# Patient Record
Sex: MALE | Race: WHITE | Employment: OTHER | URBAN - METROPOLITAN AREA
[De-identification: names, ages, dates, MRNs, and addresses within clinical notes are randomized per-mention and may not be internally consistent; named-entity substitution may affect disease eponyms.]

---

## 2017-01-04 ENCOUNTER — OFFICE VISIT (OUTPATIENT)
Dept: HEMATOLOGY | Age: 57
End: 2017-01-04

## 2017-01-04 VITALS
HEIGHT: 70 IN | BODY MASS INDEX: 28.49 KG/M2 | HEART RATE: 77 BPM | RESPIRATION RATE: 16 BRPM | TEMPERATURE: 99.1 F | DIASTOLIC BLOOD PRESSURE: 71 MMHG | SYSTOLIC BLOOD PRESSURE: 127 MMHG | OXYGEN SATURATION: 99 % | WEIGHT: 199 LBS

## 2017-01-04 DIAGNOSIS — K70.30 ALCOHOLIC CIRRHOSIS OF LIVER WITHOUT ASCITES (HCC): ICD-10-CM

## 2017-01-04 DIAGNOSIS — K70.30 ALCOHOLIC CIRRHOSIS OF LIVER WITHOUT ASCITES (HCC): Primary | ICD-10-CM

## 2017-01-04 PROBLEM — Z98.61 H/O CORONARY ANGIOPLASTY: Status: ACTIVE | Noted: 2017-01-04

## 2017-01-04 PROBLEM — M54.40 CHRONIC LOW BACK PAIN WITH SCIATICA: Status: ACTIVE | Noted: 2017-01-04

## 2017-01-04 PROBLEM — F10.11 ALCOHOL ABUSE, IN REMISSION: Status: ACTIVE | Noted: 2017-01-04

## 2017-01-04 PROBLEM — G89.29 CHRONIC LOW BACK PAIN WITH SCIATICA: Status: ACTIVE | Noted: 2017-01-04

## 2017-01-04 PROBLEM — Z98.890 H/O LUMBOSACRAL SPINE SURGERY: Status: ACTIVE | Noted: 2017-01-04

## 2017-01-04 PROBLEM — Z98.890 S/P CORONARY ANGIOGRAM: Status: ACTIVE | Noted: 2017-01-04

## 2017-01-04 RX ORDER — TRAMADOL HYDROCHLORIDE 50 MG/1
100 TABLET ORAL
Qty: 60 TAB | Refills: 3 | Status: SHIPPED | OUTPATIENT
Start: 2017-01-04 | End: 2017-01-11 | Stop reason: SDUPTHER

## 2017-01-04 RX ORDER — OMEPRAZOLE 40 MG/1
40 CAPSULE, DELAYED RELEASE ORAL DAILY
Qty: 30 CAP | Refills: 3 | Status: SHIPPED | OUTPATIENT
Start: 2017-01-04 | End: 2017-01-11 | Stop reason: SDUPTHER

## 2017-01-04 NOTE — PROGRESS NOTES
[address], MD, FACP, [facility]     April Jose Members, NP     Severo Callas, PA-C Mort Abrahams, MD, MD Smita Pearson, MAURIZIO Mosher NP        [address]     [address] [place] [address], [address] Devang Jara  22.     [phone]     FAX: 411 [address], [address] [facility], [address]     [phone]     FAX: [phone]         Patient Care Team:  Serjio Ku MD as PCP - General (Family Practice)  Karen Dean NP. [facility]      Problem List  Date Reviewed: 1/4/2017          Codes Class Noted    Alcohol abuse, in remission ICD-10-CM: F10.10  ICD-9-CM: 305.03  1/4/2017        H/O lumbosacral spine surgery ICD-10-CM: Z98.890  ICD-9-CM: V15.29  1/4/2017        Chronic low back pain with sciatica ICD-10-CM: M54.40, G89.29  ICD-9-CM: 724.2, 724.3, 338.29  1/4/2017        H/O coronary angioplasty ICD-10-CM: Z98.61  ICD-9-CM: V45.82  1/4/2017        Anxiety and depression ICD-10-CM: F41.9, F32.9  ICD-9-CM: 300.00, 311  8/22/2013                The physicians listed above have asked me to see Deja Alfaro in consultation regarding management of cirrhosis secondary to alcohol. All medical records sent by the referring physicians were reviewed including imaging studies     The patient is a 55 y.o.  male who was first found to have chronic liver disease and cirrhosis in 12/2016 when he was hospitalized at [facility] because of agitation, tremor and weakness. An assessment of liver fibrosis with biopsy or elastography has not been performed. An ultrasound of the liver suggested cirrhosis with some ascites. The patient has not developed symptomatic or obvious ascites. The patient has not developed edema. The patient has not developed hepatic encephalopathy.     The patient has not had been evaluated for varices. The most recent laboratory studies indicate that the liver transaminases are elevated, ALP is normal, total bilirubin is normal, albumin is depressed, and the platelet count is depressed. There are multiple ER visits at [facility] with a positive blood ETOH between 2013-12/2016. The patient notes fatigue, fevers, diffuse abdominal pain, nausea,     The patient has limitations in functional activities secondary to these symptoms and limitations in functional activities secondary to other medical problems that are not related to the liver disease. The patient has not experienced problems concentrating, swelling of the abdomen, swelling of the lower extremities, hematemesis, hematochezia. ALLERGIES  Allergies   Allergen Reactions    Aleve [Naproxen Sodium] Itching       MEDICATIONS  Current Outpatient Prescriptions   Medication Sig    insulin glargine (LANTUS) 100 unit/mL injection 20 Units by SubCUTAneous route nightly.  lamoTRIgine (LAMICTAL) 200 mg tablet Take 200 mg by mouth nightly.  quinapril (ACCUPRIL) 10 mg tablet Take 40 mg by mouth daily.  aspirin 81 mg tablet Take 81 mg by mouth daily.  amLODIPine (NORVASC) 10 mg tablet Take 10 mg by mouth daily.  metoprolol-XL (TOPROL XL) 200 mg XL tablet Take 200 mg by mouth daily.  glimepiride (AMARYL) 4 mg tablet Take 4 mg by mouth two (2) times a day. No current facility-administered medications for this visit. SYSTEM REVIEW NOT RELATED TO LIVER DISEASE OR REVIEWED ABOVE:  Constitution systems: Negative for fever, chills, weight gain, weight loss. Eyes: Negative for visual changes. ENT: Negative for sore throat, painful swallowing. Respiratory: Negative for cough, hemoptysis, SOB. Cardiology: Negative for chest pain, palpitations. GI:  Negative for constipation or diarrhea. : Negative for urinary frequency, dysuria, hematuria, nocturia. Skin: Negative for rash.   Hematology: Negative for easy bruising, blood clots. Musculo-skelatal: Chronic back pain. Neurologic: Negative for headaches, dizziness, vertigo, memory problems not related to HE. Psychology: Depression that is not medicated. FAMILY HISTORY:  The father  of heart disease. The mother  of heart disease. There is no family history of liver disease. SOCIAL HISTORY:  The patient is . The patient has 2 children,   The patient has never used tobacco products. The patient has previously consumed alcohol in excess. The patient has been abstinent from alcohol since 3/2016. He has starting drinking again because of abdominal pain in 2016. The patient used to work in business management. The patient has not worked since . The patient is currently receiving disability. PHYSICAL EXAMINATION:  Visit Vitals    /71 (BP 1 Location: Right arm, BP Patient Position: Sitting)    Pulse 77    Temp 99.1 °F (37.3 °C) (Tympanic)    Resp 16    Ht 5' 10\" (1.778 m)    Wt 199 lb (90.3 kg)    SpO2 99%    BMI 28.55 kg/m2     General: No acute distress. Eyes: Sclera anicteric. ENT: No oral lesions. Thyroid normal.  Nodes: No adenopathy. Skin: No spider angiomata. No jaundice. No palmar erythema. Respiratory: Lungs clear to auscultation. Cardiovascular: Regular heart rate. No murmurs. No JVD. Abdomen: Soft non-tender. Liver size normal to percussion/palpation. Spleen not palpable. No obvious ascites. Extremities: No edema. No muscle wasting. No gross arthritic changes. Neurologic: Alert and oriented. Cranial nerves grossly intact. No asterixis. LABORATORY STUDIES:  From 2016  AST/ALT/ALP/T Bili/ALB:  58/48/145/0.9/2.7  WBC/HB/PLT/INR:  Xx/11.9/66  BUN/CREAT:  10/0.7    SEROLOGIES:  Not available or performed. Testing will be performed as indicated. LIVER HISTOLOGY:  Not available or performed    ENDOSCOPIC PROCEDURES:  2014. Colonoscopy by Dr David Jj. Inflammatory polyp. RADIOLOGY:  2016. CT scan abdomen with IV contrast.  Changes consistent with cirrhosis. No liver mass lesions. No dilated bile ducts. No ascites. OTHER TESTIN2016. Drug toxicology screen negative  2016. Blood ETOH positive    ASSESSMENT AND PLAN:  Cirrhosis which has been attributed to alcohol     Will perform laboratory testing to monitor liver function and degree of liver injury. This included BMP, hepatic panel, CBC with platelet count, INR. Liver transaminases are elevated. Alkaline phosphatase is normal.  Liver function is normal.  Total bilirubin is normal.  Serum albumin is depressed. The platelet count is depressed. Serologic testing to exclude a co-existent cause of chronic liver disease were ordered. The patient has never developed any complications of cirrhosis to date. The CTP is 6. Child class A. The MELD score is 11. Esophageal varices have not been previously assessed for with upper endoscopy. Will schedule for EGD to assess for varices and need for banding. Hepatic encephalopathy has not developed to date. There is no need for treatment with lactulose and/or Xifaxan at this time. No need to restrict dietary protein at this time. The patient was directed to continue all current medications at the current dosages. There are no contraindications for the patient to take any medications that are necessary for treatment of other medical issues. The patient was advised to be abstinent of all alcohol. Thrombocytopenia secondary to cirrhosis. There is no evidence of overt bleeding. There is no indication for platelet transfusions or pharmacologic treatment to increase the platelet count. Anemia from chronic GI blood loss from portal hypertension and iron deficiency or bone marrow suppression from alcohol. Osteoporosis is more common in patients with cirrhosis.   Bone density to assess for osteoporosis has not been performed. The need for vaccination against viral hepatitis A and B will be assessed with serologic and instituted as appropriate. [facility] [address] screening has recently been performed and does not suggest [address] The next liver imaging study will be performed in 6/2017. All of the above issues were discussed with the patient. All questions were answered. The patient expressed a clear understanding of the above. [address] [place] [address] in 4 weeks to review results of blood work and develop a management plan.     Leida Jay MD  [facility] [place] [facility] [address] [place] [address], [address] [place] [address], [address] [place] [address] Street [address]  [phone]

## 2017-01-04 NOTE — MR AVS SNAPSHOT
Visit Information Date & Time Provider Department Dept. Phone Encounter #  
 1/4/2017  9:30 AM Vivek Devlin MD [facility] [employer] [id number] Follow-up Instructions Return in about 4 weeks (around 2/1/2017) for MLS. Your Appointments 1/24/2017  9:30 AM  
PROCEDURE with Sylvain Pacheco MD  
[facility] ([address]) Appt Note: EGD/Cirrhosis [address] [place] [address]  
[phone]  
  
   
 [address] [address] [place] [address]  
  
    
 2/8/2017  9:15 AM  
Follow Up with Vivek Devlin MD  
[facility] [place] [facility] ([address]) Appt Note: Alcoholic Cirrhosis [address] [place]  
  
   
 [address] [facility] [address] [place] [address] Upcoming Health Maintenance Date Due Hepatitis C Screening 1960 Pneumococcal 19-64 Medium Risk (1 of 1 - PPSV23) 6/6/1980 DTaP/Tdap/Td series (1 - Tdap) 6/6/1982 FOBT Q 1 YEAR AGE 50-75 6/6/2011 INFLUENZA AGE 9 TO ADULT 8/1/2016 Allergies as of 1/4/2017  Review Complete On: 1/4/2017 By: Marylee Bogus Severity Noted Reaction Type Reactions Aleve [Naproxen Sodium]  08/21/2013    Itching Current Immunizations  Never Reviewed No immunizations on file. Not reviewed this visit You Were Diagnosed With   
  
 Codes Comments Alcoholic cirrhosis [facility] liver without asc[facility]    -  Primary ICD-10-CM: K70.30 ICD-9-CM: 571.2 Vitals BP Pulse Temp Resp Height(growth percentile) 127/71 (BP 1 Location: Right arm, BP Patient Position: Sitting) 77 99.1 °F (37.3 °C) (Tympanic) 16 5' 10\" (1.778 m) Weight(growth percentile) SpO2 BMI Smoking Status 199 lb (90.3 kg) 99% 28.55 kg/m2 Never Smoker Vitals History BMI and BSA Data Body Mass Index Body Surface Area 28.55 kg/m 2 2.11 m 2 Preferred Pharmacy Pharmacy Name Phone [address] [place] [address] [phone] Your Updated Medication List  
  
   
This list is accurate as of: 1/4/17 10:39 AM.  Always use your most recent med list.  
  
  
  
  
 omeprazole 40 mg capsule Commonly known as:  PRILOSEC Take 1 Cap by mouth daily. traMADol 50 mg tablet Commonly known as:  ULTRAM  
Take 2 Tabs by mouth every eight (8) hours as needed for Pain. Max Daily Amount: 300 mg. Prescriptions Printed Refills  
 traMADol (ULTRAM) 50 mg tablet 3 Sig: Take 2 Tabs by mouth every eight (8) hours as needed for Pain. Max Daily Amount: 300 mg. Class: Print Route: Oral  
  
Prescriptions Sent to Pharmacy Refills  
 omeprazole (PRILOSEC) 40 mg capsule 3 Sig: Take 1 Cap by mouth daily. Class: Normal  
 Pharmacy: [address] [place] [address] #: [phone] Route: Oral  
  
Follow-up Instructions Return in about 4 weeks (around 2/1/2017) for MLS. To-Do List   
 01/04/2017 Lab:  ACTIN (SMOOTH MUSCLE) ANTIBODY   
  
 01/04/2017 Lab:  AFP WITH AFP-L3% 01/04/2017 Lab:  ALPHA-1-ANTITRYPSIN, TOTAL   
  
 01/04/2017 Lab:  ANTINUCLEAR ANTIBODIES, IFA   
  
 01/04/2017 Lab:  CBC WITH AUTOMATED DIFF   
  
 01/04/2017 Lab:  CERULOPLASMIN   
  
 01/04/2017 Lab:  ETHYL ALCOHOL   
  
 01/04/2017 Lab:  FERRITIN   
  
 01/04/2017 Lab:  HCV AB W/REFLEX VERIFICATION   
  
 01/04/2017 Lab:  HEMOGLOBIN A1C WITH EAG   
  
 01/04/2017 Lab:  HEP A AB, TOTAL   
  
 01/04/2017 Lab:  HEP B SURFACE AB   
  
 01/04/2017 Lab:  HEP B SURFACE AG   
  
 01/04/2017 Lab:  HEPATIC FUNCTION PANEL   
  
 01/04/2017 Lab:  HEPATITIS B CORE AB, TOTAL   
  
 01/04/2017 Lab:  IRON PROFILE   
  
 01/04/2017 Lab: METABOLIC PANEL, BASIC   
  
 01/04/2017 Lab:  MITOCHONDRIAL M2 AB   
  
 01/04/2017 Lab:  PROTHROMBIN TIME + INR   
  
 01/04/2017 Imaging:  US ABD COMP W ELASTOGRAPHY   
  
 02/03/2017 GI:  EGD Introducing [facility]! Evens Castellano introduces [employer] patient portal. Now you can access parts of your medical record, email your doctor's office, and request medication refills online. 1. In your internet browser, go to [email or web address] 2. Click on the First Time User? Click Here link in the Sign In box. You will see the New Member Sign Up page. 3. Enter your [employer] Access Code exactly as it appears below. You will not need to use this code after youve completed the sign-up process. If you do not sign up before the expiration date, you must request a new code. · [employer] Access Code: [email or web address] Expires: 4/4/2017  9:21 AM 
 
4. Enter the last four digits of your Social Security Number (xxxx) and Date of Birth (mm/dd/yyyy) as indicated and click Submit. You will be taken to the next sign-up page. 5. Create a [employer] ID. This will be your [employer] login ID and cannot be changed, so think of one that is secure and easy to remember. 6. Create a [employer] password. You can change your password at any time. 7. Enter your Password Reset Question and Answer. This can be used at a later time if you forget your password. 8. Enter your e-mail address. You will receive e-mail notification when new information is available in [address] 9. Click Sign Up. You can now view and download portions of your medical record. 10. Click the Download Summary menu link to download a portable copy of your medical information. If you have questions, please visit the Frequently Asked Questions section of the [employer] website. Remember, [employer] is NOT to be used for urgent needs. For medical emergencies, dial 911. Now available from your iPhone and Android! Please provide this summary of care documentation to your next provider. Your primary care clinician is listed as  [facility] Alex. If you have any questions after today's visit, please call [phone].

## 2017-01-11 ENCOUNTER — TELEPHONE (OUTPATIENT)
Dept: HEMATOLOGY | Age: 57
End: 2017-01-11

## 2017-01-11 NOTE — TELEPHONE ENCOUNTER
Patient would like for you to send his prescriptions to [facility] in [place]. Omeprazole and tramadol.

## 2017-01-11 NOTE — TELEPHONE ENCOUNTER
Received call from patient. Verified name and date of birth. Patient informed me that Rx was not sent into his [place]. After reviewing chart, advised patient that it was sent into [facility], advised patient that I would make the update and resend to correct pharmacy.

## 2017-01-12 RX ORDER — TRAMADOL HYDROCHLORIDE 50 MG/1
100 TABLET ORAL
Qty: 60 TAB | Refills: 3 | Status: SHIPPED | OUTPATIENT
Start: 2017-01-12 | End: 2018-02-14

## 2017-01-12 RX ORDER — OMEPRAZOLE 40 MG/1
40 CAPSULE, DELAYED RELEASE ORAL DAILY
Qty: 30 CAP | Refills: 3 | Status: SHIPPED | OUTPATIENT
Start: 2017-01-12 | End: 2017-01-16 | Stop reason: SDUPTHER

## 2017-01-16 DIAGNOSIS — K21.00 GASTROESOPHAGEAL REFLUX DISEASE WITH ESOPHAGITIS: Primary | ICD-10-CM

## 2017-01-16 RX ORDER — OMEPRAZOLE 40 MG/1
40 CAPSULE, DELAYED RELEASE ORAL DAILY
Qty: 30 CAP | Refills: 3 | Status: SHIPPED | OUTPATIENT
Start: 2017-01-16 | End: 2018-02-14

## 2017-01-17 ENCOUNTER — TELEPHONE (OUTPATIENT)
Dept: HEMATOLOGY | Age: 57
End: 2017-01-17

## 2017-01-17 NOTE — TELEPHONE ENCOUNTER
Kimberly Ramirez states that [facility]'t [facility] tramadol prescription can you please resend prescription?

## 2017-01-23 RX ORDER — TRAMADOL HYDROCHLORIDE 50 MG/1
100 TABLET ORAL
Qty: 60 TAB | Refills: 3 | Status: CANCELLED | OUTPATIENT
Start: 2017-01-23

## 2017-01-25 ENCOUNTER — TELEPHONE (OUTPATIENT)
Dept: HEMATOLOGY | Age: 57
End: 2017-01-25

## 2018-01-12 ENCOUNTER — HOSPITAL ENCOUNTER (INPATIENT)
Age: 58
LOS: 6 days | Discharge: HOME | DRG: 753 | End: 2018-01-18
Attending: STUDENT IN AN ORGANIZED HEALTH CARE EDUCATION/TRAINING PROGRAM | Admitting: STUDENT IN AN ORGANIZED HEALTH CARE EDUCATION/TRAINING PROGRAM
Payer: MEDICAID

## 2018-01-12 PROBLEM — R45.851 SUICIDAL THOUGHTS: Status: ACTIVE | Noted: 2018-01-12

## 2018-01-12 PROBLEM — F32.A DEPRESSION: Status: ACTIVE | Noted: 2018-01-12

## 2018-01-12 LAB — GLUCOSE BLD STRIP.AUTO-MCNC: 175 MG/DL (ref 70–110)

## 2018-01-12 PROCEDURE — 74011250637 HC RX REV CODE- 250/637: Performed by: STUDENT IN AN ORGANIZED HEALTH CARE EDUCATION/TRAINING PROGRAM

## 2018-01-12 PROCEDURE — 82962 GLUCOSE BLOOD TEST: CPT

## 2018-01-12 PROCEDURE — 65220000003 HC RM SEMIPRIVATE PSYCH

## 2018-01-12 RX ORDER — HALOPERIDOL 5 MG/ML
5 INJECTION INTRAMUSCULAR
Status: DISCONTINUED | OUTPATIENT
Start: 2018-01-12 | End: 2018-01-18 | Stop reason: HOSPADM

## 2018-01-12 RX ORDER — GABAPENTIN 300 MG/1
300 CAPSULE ORAL 2 TIMES DAILY
Status: DISCONTINUED | OUTPATIENT
Start: 2018-01-12 | End: 2018-01-18 | Stop reason: HOSPADM

## 2018-01-12 RX ORDER — QUETIAPINE FUMARATE 400 MG/1
400 TABLET, FILM COATED ORAL
COMMUNITY
End: 2018-02-14

## 2018-01-12 RX ORDER — HALOPERIDOL 5 MG/1
5 TABLET ORAL
Status: DISCONTINUED | OUTPATIENT
Start: 2018-01-12 | End: 2018-01-18 | Stop reason: HOSPADM

## 2018-01-12 RX ORDER — BUSPIRONE HYDROCHLORIDE 15 MG/1
15 TABLET ORAL 2 TIMES DAILY
COMMUNITY
End: 2018-02-14

## 2018-01-12 RX ORDER — METFORMIN HYDROCHLORIDE 500 MG/1
1000 TABLET, EXTENDED RELEASE ORAL 2 TIMES DAILY WITH MEALS
Status: DISCONTINUED | OUTPATIENT
Start: 2018-01-13 | End: 2018-01-18 | Stop reason: HOSPADM

## 2018-01-12 RX ORDER — TRAZODONE HYDROCHLORIDE 50 MG/1
50 TABLET ORAL
Status: DISCONTINUED | OUTPATIENT
Start: 2018-01-12 | End: 2018-01-18 | Stop reason: HOSPADM

## 2018-01-12 RX ORDER — GABAPENTIN 300 MG/1
300 CAPSULE ORAL 2 TIMES DAILY
COMMUNITY
End: 2018-02-14

## 2018-01-12 RX ORDER — MAGNESIUM SULFATE 100 %
16 CRYSTALS MISCELLANEOUS AS NEEDED
Status: DISCONTINUED | OUTPATIENT
Start: 2018-01-12 | End: 2018-01-18 | Stop reason: HOSPADM

## 2018-01-12 RX ORDER — ACETAMINOPHEN 500 MG
500 TABLET ORAL
Status: DISCONTINUED | OUTPATIENT
Start: 2018-01-12 | End: 2018-01-18 | Stop reason: HOSPADM

## 2018-01-12 RX ORDER — QUETIAPINE FUMARATE 200 MG/1
400 TABLET, FILM COATED ORAL 2 TIMES DAILY
Status: DISCONTINUED | OUTPATIENT
Start: 2018-01-12 | End: 2018-01-13

## 2018-01-12 RX ORDER — METFORMIN HYDROCHLORIDE 1000 MG/1
1000 TABLET ORAL 2 TIMES DAILY WITH MEALS
COMMUNITY
End: 2018-02-14

## 2018-01-12 RX ORDER — LORAZEPAM 2 MG/ML
1-2 INJECTION INTRAMUSCULAR
Status: DISCONTINUED | OUTPATIENT
Start: 2018-01-12 | End: 2018-01-18 | Stop reason: HOSPADM

## 2018-01-12 RX ORDER — LORAZEPAM 1 MG/1
1-2 TABLET ORAL
Status: DISCONTINUED | OUTPATIENT
Start: 2018-01-12 | End: 2018-01-18 | Stop reason: HOSPADM

## 2018-01-12 RX ORDER — INSULIN LISPRO 100 [IU]/ML
INJECTION, SOLUTION INTRAVENOUS; SUBCUTANEOUS
Status: DISCONTINUED | OUTPATIENT
Start: 2018-01-12 | End: 2018-01-18 | Stop reason: HOSPADM

## 2018-01-12 RX ORDER — BUSPIRONE HYDROCHLORIDE 5 MG/1
15 TABLET ORAL 2 TIMES DAILY
Status: DISCONTINUED | OUTPATIENT
Start: 2018-01-12 | End: 2018-01-18 | Stop reason: HOSPADM

## 2018-01-12 RX ADMIN — QUETIAPINE FUMARATE 400 MG: 200 TABLET ORAL at 20:46

## 2018-01-12 RX ADMIN — BUSPIRONE HYDROCHLORIDE 15 MG: 5 TABLET ORAL at 20:46

## 2018-01-12 RX ADMIN — GABAPENTIN 300 MG: 300 CAPSULE ORAL at 20:46

## 2018-01-12 NOTE — BSMART NOTE
COMPREHENSIVE ASSESSMENT FORM PART 1  SECTION I - DISPOSITION  Communicated with B[place] at [facility] ([phone])    The plan is to transfer the patient from the [facility] to [address] [place] [address] Road [id number] on the Adult/Chemical Dependency Unit. The unit phone is [phone]. The Psychiatrist consulted was Dr. Sarah Tam who is also the accepting Psychiatrist.    [facility] unit admitting diagnosis is Depression and Suicidal Ideations (with a plan to overdose)    Allergies:  Aleve - Naproxen    EKG completed:  Normal Sinus Rhythm at 72 bpm.  Normal EKG. Patient was released from detention, last night (1/11/2018) after a 7-month stay. Once released, he was transported by police to [facility] with a complaint of chest pain. He later identified SI with a plan to overdose. ADMISSION ORDERS:  Patient is diabetic. Patient has a history of underlying heart disease with two (2) previous MI's managed by angioplasty without stenting. * Routine PRN admission medication orders  * Routine lab work  * Vital signs, routine  * Safety precautions  * POC Glucose  * Sliding scale insulin  * Diabetic diet with snacks (AM, Lunch, PM)    * Continue previous medications:       -- buspirone HCl/Buspar, 15 mg by mouth two times a day       -- gabapentin/Neurontin, take 300 mg by mouth two times a day       -- metformin XR/Glucophage-XR, 500 mg 24-hour tablet, take                1,000 mg by mouth two times a day.        -- quetiapine/Seroquel, take 400 mg by mouth two times a day    Manuella Lesches, RN, BSN

## 2018-01-12 NOTE — IP AVS SNAPSHOT
[facility] 
 
 
 [facility] Patient: Carlos Enrique Olivas MRN: [id number] :1960 About your hospitalization You were admitted on:  2018 You last received care in the:  [facility] ADULT CHEM DEP You were discharged on:  2018 Why you were hospitalized Your primary diagnosis was:  Not on File Your diagnoses also included:  Depression, Suicidal Thoughts Follow-up Information Follow up With Details Comments Contact Info Nidia Prince MD   [address] [place] [address] 
[phone] [facility] Service Board On 2018 Patient is scheduled to meet with Kat Chance or Pablo Ruggiero with the @ [address] [place] [address] 
[phone] Discharge Orders None A check tanvi indicates which time of day the medication should be taken. My Medications CHANGE how you take these medications Instructions Each Dose to Equal  
 Morning Noon Evening Bedtime * busPIRone 15 mg tablet Commonly known as:  BUSPAR What changed:  Another medication with the same name was added. Make sure you understand how and when to take each. Your last dose was: Your next dose is: Take 15 mg by mouth two (2) times a day. 15 mg  
    
   
   
   
  
 * busPIRone 15 mg tablet Commonly known as:  BUSPAR What changed: You were already taking a medication with the same name, and this prescription was added. Make sure you understand how and when to take each. Your last dose was: Your next dose is: Take 1 Tab by mouth two (2) times a day. Indications: Generalized Anxiety Disorder 15 mg  
    
   
   
   
  
 * NEURONTIN 300 mg capsule Generic drug:  gabapentin What changed:  Another medication with the same name was added. Make sure you understand how and when to take each. Your last dose was: Your next dose is: Take 300 mg by mouth two (2) times a day. 300 mg  
    
   
   
   
  
 * gabapentin 300 mg capsule Commonly known as:  NEURONTIN What changed: You were already taking a medication with the same name, and this prescription was added. Make sure you understand how and when to take each. Your last dose was: Your next dose is: Take 1 Cap by mouth two (2) times a day. Indications: Anxiety 300 mg  
    
   
   
   
  
 * SEROquel 400 mg tablet Generic drug:  QUEtiapine What changed:  Another medication with the same name was added. Make sure you understand how and when to take each. Your last dose was: Your next dose is: Take 400 mg by mouth. 400 mg  
    
   
   
   
  
 * QUEtiapine 400 mg tablet Commonly known as:  SEROquel What changed: You were already taking a medication with the same name, and this prescription was added. Make sure you understand how and when to take each. Your last dose was: Your next dose is: Take 1 Tab by mouth nightly. Indications: Generalized Anxiety Disorder 400 mg  
    
   
   
   
  
 * QUEtiapine 50 mg tablet Commonly known as:  SEROquel What changed: You were already taking a medication with the same name, and this prescription was added. Make sure you understand how and when to take each. Your last dose was: Your next dose is: Take 1 Tab by mouth three (3) times daily as needed (anxiety). Indications: Generalized Anxiety Disorder 50 mg  
    
   
   
   
  
 * Notice: This list has 7 medication(s) that are the same as other medications prescribed for you. Read the directions carefully, and ask your doctor or other care provider to review them with you. CONTINUE taking these medications Instructions Each Dose to Equal  
 Morning Noon Evening Bedtime metFORMIN 1,000 mg tablet Commonly known as:  GLUCOPHAGE Your last dose was: Your next dose is: Take 1,000 mg by mouth two (2) times daily (with meals). 1000 mg  
    
   
   
   
  
 omeprazole 40 mg capsule Commonly known as:  PRILOSEC Your last dose was: Your next dose is: Take 1 Cap by mouth daily. 40 mg  
    
   
   
   
  
 traMADol 50 mg tablet Commonly known as:  ULTRAM  
   
Your last dose was: Your next dose is: Take 2 Tabs by mouth every eight (8) hours as needed for Pain. Max Daily Amount: 300 mg.  
 100 mg Where to Get Your Medications Information on where to get these meds will be given to you by the nurse or doctor. ! Ask your nurse or doctor about these medications  
  busPIRone 15 mg tablet  
 gabapentin 300 mg capsule QUEtiapine 400 mg tablet QUEtiapine 50 mg tablet Discharge Instructions BEHAVIORAL HEALTH NURSING DISCHARGE NOTE Emergency Numbers [facility] Desk: [phone] [facility]: [phone] Suicide Prevention Line: [phone] (TALK) The following personal items collected during your admission are returned to you:  
Dental Appliance: Dental Appliances: None Vision:   
Hearing Aid:   
Jewelry: Jewelry: None Clothing: Clothing: Footwear, [address], [address] Other Valuables: Other Valuables: Cell Phone (locker 126/1) Valuables sent to safe: Personal Items Sent to Safe: va ebt, bank of [place] card, [facility] visa The discharge information has been reviewed with the patient. The patient verbalized understanding. Impulcity Activation Thank you for requesting access to Impulcity. Please follow the instructions below to securely access and download your online medical record.  Impulcity allows you to send messages to your doctor, view your test results, renew your prescriptions, schedule appointments, and more. How Do I Sign Up? In your internet browser, go to [email or web address] Click on the First Time User? Click Here link in the Sign In box. You will be redirect to the New Member Sign Up page. Enter your TriPlay Access Code exactly as it appears below. You will not need to use this code after youve completed the sign-up process. If you do not sign up before the expiration date, you must request a new code. TriPlay Access Code: [email or web address] Expires: 4/15/2018 10:45 AM (This is the date your TriPlay access code will ) Enter the last four digits of your Social Security Number (xxxx) and Date of Birth (mm/dd/yyyy) as indicated and click Submit. You will be taken to the next sign-up page. Create a TriPlay ID. This will be your TriPlay login ID and cannot be changed, so think of one that is secure and easy to remember. Create a TriPlay password. You can change your password at any time. Enter your Password Reset Question and Answer. This can be used at a later time if you forget your password. Enter your e-mail address. You will receive e-mail notification when new information is available in [address] Click Sign Up. You can now view and download portions of your medical record. Click the BioConsortia link to download a portable copy of your medical information. Additional Information If you have questions, please visit the Frequently Asked Questions section of the TriPlay website at [email or web address] Remember, TriPlay is NOT to be used for urgent needs. For medical emergencies, dial 911. Patient armband removed and shredded Introducing [facility]! [facility] introduces TriPlay patient portal. Now you can access parts of your medical record, email your doctor's office, and request medication refills online.    
 
1. In your internet browser, go to [email or web address] 2. Click on the First Time User? Click Here link in the Sign In box. You will see the New Member Sign Up page. 3. Enter your [employer] Access Code exactly as it appears below. You will not need to use this code after youve completed the sign-up process. If you do not sign up before the expiration date, you must request a new code. · [employer] Access Code: [email or web address] Expires: 4/15/2018 10:45 AM 
 
4. Enter the last four digits of your Social Security Number (xxxx) and Date of Birth (mm/dd/yyyy) as indicated and click Submit. You will be taken to the next sign-up page. 5. Create a [employer] ID. This will be your [employer] login ID and cannot be changed, so think of one that is secure and easy to remember. 6. Create a [employer] password. You can change your password at any time. 7. Enter your Password Reset Question and Answer. This can be used at a later time if you forget your password. 8. Enter your e-mail address. You will receive e-mail notification when new information is available in [address] 9. Click Sign Up. You can now view and download portions of your medical record. 10. Click the Download Summary menu link to download a portable copy of your medical information. If you have questions, please visit the Frequently Asked Questions section of the [employer] website. Remember, [employer] is NOT to be used for urgent needs. For medical emergencies, dial 911. Now available from your iPhone and Android! Providers Seen During Your Hospitalization Provider Specialty Primary office phone Severo Loss, MD Psychiatry [phone] Immunizations Administered for This Admission Name Date Influenza Vaccine (Quad) PF 1/16/2018 Your Primary Care Physician (PCP) Primary Care Physician Office Phone Office Fax Balbina Bates [phone] You are allergic to the following Allergen Reactions Aleve (Naproxen Sodium) Itching Recent Documentation Height Weight BMI Smoking Status 1.803 m 86.2 kg 26.5 kg/m2 Never Smoker Emergency Contacts Name Discharge Info Relation Home Work Mobile Aixa Bridges N/A  AT THIS TIME [6] Friend [5]   [phone] Patient Belongings The following personal items are in your possession at time of discharge: 
  Dental Appliances: None         Home Medications: None   Jewelry: None  Clothing: Footwear, Shorts, Shirt    Other Valuables: Cell Phone (locker 126/1)  Personal Items Sent to Safe: vishal white, [employer], [employer] visa Please provide this summary of care documentation to your next provider. Signatures-by signing, you are acknowledging that this After Visit Summary has been reviewed with you and you have received a copy. Patient Signature:  ____________________________________________________________ Date:  ____________________________________________________________  
  
Shea Moritz Provider Signature:  ____________________________________________________________ Date:  ____________________________________________________________

## 2018-01-13 LAB
ALBUMIN SERPL-MCNC: 3.3 G/DL (ref 3.4–5)
ALBUMIN/GLOB SERPL: 0.9 {RATIO} (ref 0.8–1.7)
ALP SERPL-CCNC: 107 U/L (ref 45–117)
ALT SERPL-CCNC: 29 U/L (ref 16–61)
ANION GAP SERPL CALC-SCNC: 6 MMOL/L (ref 3–18)
AST SERPL-CCNC: 32 U/L (ref 15–37)
BASOPHILS # BLD: 0 K/UL (ref 0–0.06)
BASOPHILS NFR BLD: 0 % (ref 0–2)
BILIRUB SERPL-MCNC: 0.7 MG/DL (ref 0.2–1)
BUN SERPL-MCNC: 8 MG/DL (ref 7–18)
BUN/CREAT SERPL: 11 (ref 12–20)
CALCIUM SERPL-MCNC: 8.2 MG/DL (ref 8.5–10.1)
CHLORIDE SERPL-SCNC: 112 MMOL/L (ref 100–108)
CO2 SERPL-SCNC: 27 MMOL/L (ref 21–32)
CREAT SERPL-MCNC: 0.72 MG/DL (ref 0.6–1.3)
DIFFERENTIAL METHOD BLD: ABNORMAL
EOSINOPHIL # BLD: 0.1 K/UL (ref 0–0.4)
EOSINOPHIL NFR BLD: 2 % (ref 0–5)
ERYTHROCYTE [DISTWIDTH] IN BLOOD BY AUTOMATED COUNT: 14.4 % (ref 11.6–14.5)
GLOBULIN SER CALC-MCNC: 3.7 G/DL (ref 2–4)
GLUCOSE BLD STRIP.AUTO-MCNC: 112 MG/DL (ref 70–110)
GLUCOSE BLD STRIP.AUTO-MCNC: 125 MG/DL (ref 70–110)
GLUCOSE BLD STRIP.AUTO-MCNC: 137 MG/DL (ref 70–110)
GLUCOSE BLD STRIP.AUTO-MCNC: 147 MG/DL (ref 70–110)
GLUCOSE SERPL-MCNC: 123 MG/DL (ref 74–99)
HCT VFR BLD AUTO: 33.8 % (ref 36–48)
HGB BLD-MCNC: 11.6 G/DL (ref 13–16)
LYMPHOCYTES # BLD: 0.6 K/UL (ref 0.9–3.6)
LYMPHOCYTES NFR BLD: 24 % (ref 21–52)
MCH RBC QN AUTO: 29.7 PG (ref 24–34)
MCHC RBC AUTO-ENTMCNC: 34.3 G/DL (ref 31–37)
MCV RBC AUTO: 86.7 FL (ref 74–97)
MONOCYTES # BLD: 0.3 K/UL (ref 0.05–1.2)
MONOCYTES NFR BLD: 14 % (ref 3–10)
NEUTS SEG # BLD: 1.4 K/UL (ref 1.8–8)
NEUTS SEG NFR BLD: 60 % (ref 40–73)
PLATELET # BLD AUTO: 60 K/UL (ref 135–420)
PMV BLD AUTO: 10.2 FL (ref 9.2–11.8)
POTASSIUM SERPL-SCNC: 3.5 MMOL/L (ref 3.5–5.5)
PROT SERPL-MCNC: 7 G/DL (ref 6.4–8.2)
RBC # BLD AUTO: 3.9 M/UL (ref 4.7–5.5)
SODIUM SERPL-SCNC: 145 MMOL/L (ref 136–145)
TSH SERPL DL<=0.05 MIU/L-ACNC: 0.95 UIU/ML (ref 0.36–3.74)
WBC # BLD AUTO: 2.4 K/UL (ref 4.6–13.2)

## 2018-01-13 PROCEDURE — 80053 COMPREHEN METABOLIC PANEL: CPT | Performed by: STUDENT IN AN ORGANIZED HEALTH CARE EDUCATION/TRAINING PROGRAM

## 2018-01-13 PROCEDURE — 65220000003 HC RM SEMIPRIVATE PSYCH

## 2018-01-13 PROCEDURE — 74011250637 HC RX REV CODE- 250/637: Performed by: STUDENT IN AN ORGANIZED HEALTH CARE EDUCATION/TRAINING PROGRAM

## 2018-01-13 PROCEDURE — 82962 GLUCOSE BLOOD TEST: CPT

## 2018-01-13 PROCEDURE — 85025 COMPLETE CBC W/AUTO DIFF WBC: CPT | Performed by: STUDENT IN AN ORGANIZED HEALTH CARE EDUCATION/TRAINING PROGRAM

## 2018-01-13 PROCEDURE — 36415 COLL VENOUS BLD VENIPUNCTURE: CPT | Performed by: STUDENT IN AN ORGANIZED HEALTH CARE EDUCATION/TRAINING PROGRAM

## 2018-01-13 PROCEDURE — 84443 ASSAY THYROID STIM HORMONE: CPT | Performed by: STUDENT IN AN ORGANIZED HEALTH CARE EDUCATION/TRAINING PROGRAM

## 2018-01-13 RX ORDER — QUETIAPINE 200 MG/1
400 TABLET, FILM COATED, EXTENDED RELEASE ORAL
Status: DISCONTINUED | OUTPATIENT
Start: 2018-01-13 | End: 2018-01-16

## 2018-01-13 RX ADMIN — METFORMIN HYDROCHLORIDE 1000 MG: 500 TABLET, EXTENDED RELEASE ORAL at 07:55

## 2018-01-13 RX ADMIN — BUSPIRONE HYDROCHLORIDE 15 MG: 5 TABLET ORAL at 20:37

## 2018-01-13 RX ADMIN — GABAPENTIN 300 MG: 300 CAPSULE ORAL at 07:55

## 2018-01-13 RX ADMIN — BUSPIRONE HYDROCHLORIDE 15 MG: 5 TABLET ORAL at 07:55

## 2018-01-13 RX ADMIN — METFORMIN HYDROCHLORIDE 1000 MG: 500 TABLET, EXTENDED RELEASE ORAL at 16:53

## 2018-01-13 RX ADMIN — GABAPENTIN 300 MG: 300 CAPSULE ORAL at 20:37

## 2018-01-13 RX ADMIN — QUETIAPINE 400 MG: 200 TABLET, EXTENDED RELEASE ORAL at 20:37

## 2018-01-13 NOTE — BH NOTES
Gladis Holland is not participating in Recreational Therapy. Group time: 1 hour    Personal goal for participation: fresh air break    Goal orientation: social    Group therapy participation: excused    Therapeutic interventions reviewed and discussed: Staff encouraged Pt.  To participate in group    Impression of participation: new admission

## 2018-01-13 NOTE — BH NOTES
Patient spent a large part of the afternoon in the milieu and has also spent some time resting. Patient this afternoon is focused on how he an get a power of  so his friend can get his money out of the bank before his wife gets it. Patient otherwise has been unremarkable and has been compliant with medications. Patient denies SI/HI and AVH.

## 2018-01-13 NOTE — PROGRESS NOTES
Patient escorted to ADCD Unit, voluntary status, alert and oriented x 4, patient stated that he is here because of \"suicidal thoughts. ..wife is a heroin addict and had me arrested for assault. I been in residential for the last 7 months. \" Presently, patient denied thoughts of harm to self or others; denied hallucinations; stated that his stressors are related to wife's drug problem and not being able to work. Patient also stated that he is interested in long-term alcohol abuse program d/t his desire to drink alcoholic beverages, even though he has not been drinking for seven months; oriented to room/unit routines; will monitor and maintain safe environment.

## 2018-01-13 NOTE — H&P
Psychiatry History and Physical    Subjective:     Date of Evaluation:  1/13/2018    Reason for Referral:  José Miguel Rivas was referred to the examiners from [facility] for SI with plan to OD and ETOH detox. History of Presenting Problem: 58y/o C male in NAD well developed and nourished. Prior admit to [facility] in 2012. Numerous Detox admits in the past. Medical problems: Depression, ETOH abuse, Chronic LBP, Depression, CONSTANTIN, CAD, DM, HTN. S/P angioplasty     Patient Active Problem List    Diagnosis Date Noted    Depression 01/12/2018    Suicidal thoughts 01/12/2018    Alcohol abuse, in remission 01/04/2017    H/O lumbosacral spine surgery 01/04/2017    Chronic low back pain with sciatica 01/04/2017    H/O coronary angioplasty 01/04/2017    Anxiety and depression 08/22/2013     Past Medical History:   Diagnosis Date    CAD (coronary artery disease)     \"cardiac stent placed 1 year ago\"    Depression     Diabetes ([address]     Hypertension     Substance abuse     Suicidal thoughts      Past Surgical History:   Procedure Laterality Date    HX CORONARY STENT PLACEMENT         Family History   Problem Relation Age of Onset    Suicide Father     Diabetes Brother     Psychiatric Disorder        Social History   Substance Use Topics    Smoking status: Never Smoker    Smokeless tobacco: Never Used    Alcohol use 6.0 oz/week     12 Cans of beer per week      Comment: \"12 pack daily\"     Prior to Admission medications    Medication Sig Start Date End Date Taking? Authorizing Provider   metFORMIN (GLUCOPHAGE) 1,000 mg tablet Take 1,000 mg by mouth two (2) times daily (with meals). Yes Historical Provider   QUEtiapine (SEROQUEL) 400 mg tablet Take 400 mg by mouth. Historical Provider   busPIRone (BUSPAR) 15 mg tablet Take 15 mg by mouth two (2) times a day. Historical Provider   gabapentin (NEURONTIN) 300 mg capsule Take 300 mg by mouth two (2) times a day.     Historical Provider   omeprazole (PRILOSEC) 40 mg capsule Take 1 Cap by mouth daily. 1/16/17   Chaitanya Beavers MD   traMADol (ULTRAM) 50 mg tablet Take 2 Tabs by mouth every eight (8) hours as needed for Pain.  Max Daily Amount: 300 mg. 1/12/17   Chaitanya Beavers MD     Allergies   Allergen Reactions    Aleve [Naproxen Sodium] Itching        Review of Systems - History obtained from chart review and the patient  General ROS: positive for  - sleep disturbance  Psychological ROS: positive for - anxiety, depression and sleep disturbances  Respiratory ROS: no cough, shortness of breath, or wheezing  Cardiovascular ROS: no chest pain or dyspnea on exertion  Gastrointestinal ROS: no abdominal pain, change in bowel habits, or black or bloody stools  Genito-Urinary ROS: no dysuria, trouble voiding, or hematuria  Musculoskeletal ROS: positive for - muscle pain  Neurological ROS: no TIA or stroke symptoms  Dermatological ROS: negative      Objective:     Patient Vitals for the past 8 hrs:   BP Temp Pulse Resp   01/13/18 0808 131/86 97.3 °F (36.3 °C) 76 18       Mental Status exam: WNL except for    Sensorium  oriented to time, place and person   Orientation person, place, time/date and situation   Relations cooperative   Eye Contact appropriate   Appearance:  age appropriate and within normal Limits   Motor Behavior:  gait stable and within normal limits   Speech:  normal volume   Vocabulary average   Thought Process: logical   Thought Content free of delusions and free of hallucinations   Suicidal ideations no plan  and no intention   Homicidal ideations no plan  and no intention   Mood:  depressed   Affect:  depressed   Memory recent  adequate   Memory remote:  adequate   Concentration:  adequate   Abstraction:  concrete   Insight:  good   Reliability good   Judgment:  good         Physical Exam:   Visit Vitals    /86 (BP 1 Location: Right arm, BP Patient Position: Sitting)    Pulse 76    Temp 97.3 °F (36.3 °C)    Resp 18    Ht 5' 11\" (1.803 m)    Wt 86.2 kg (190 lb)    BMI 26.5 kg/m2     General appearance: alert, cooperative, no distress, appears stated age  Head: Normocephalic, without obvious abnormality, atraumatic  Eyes: negative  Ears: normal TM's and external ear canals AU  Nose: Nares normal. Septum midline. Mucosa normal. No drainage or sinus tenderness. Throat: Lips, mucosa, and tongue normal. Teeth and gums normal  Neck: supple, symmetrical, trachea midline, no adenopathy, thyroid: not enlarged, symmetric, no tenderness/mass/nodules, no carotid bruit and no JVD  Back: symmetric, no curvature. ROM normal. No CVA tenderness. Lungs: clear to auscultation bilaterally  Chest wall: no tenderness  Heart: regular rate and rhythm, S1, S2 normal, no murmur, click, rub or gallop  Abdomen: soft, non-tender. Bowel sounds normal. No masses,  no organomegaly  Extremities: extremities normal, atraumatic, no cyanosis or edema  Pulses: 2+ and symmetric  Skin: Skin color, texture, turgor normal. No rashes or lesions  Lymph nodes: Cervical, supraclavicular, and axillary nodes normal.  Neurologic: Grossly normal        Impression:      Active Problems:    Depression (1/12/2018)      Suicidal thoughts (1/12/2018)          Plan:     Recommendations for Treatment/Conditions:  Psychiatric treatment recommended while in hospital  Admit to Psych services for SI                                                  ETOH detox                                                  Depression with anxiety                                                  Bipolar    Referral To: Inpatient psychiatric care    Competency Statement: At the current time, the patient is competent to make informed consent regarding their current medical care and discharge planning and/or financial decisions.       [employer], [address]   1/13/2018 9:30 AM

## 2018-01-13 NOTE — PROGRESS NOTES
Problem: Suicide/Homicide (Adult/Pediatric)  Goal: *STG: Remains safe in hospital  Patient will remain free of harm to self and others every shift throughout hospitalization. Outcome: Progressing Towards Goal  Pt remains safe. Goal: *STG/LTG: Complies with medication therapy  Patient will take medication as prescribed every shift throughout hospital stay. Outcome: Progressing Towards Goal  Pt takes medications as prescribed. Problem: Falls - Risk of  Goal: *Absence of Falls  Patient will remain free of falls every shift throughout hospital stay. Patient will verbalize understanding regarding safety and fall prevention measures to be utilized every shift throughout hospital stay. Document Trish Arzola Fall Risk and appropriate interventions in the flowsheet. Outcome: Progressing Towards Goal  Fall Risk Interventions:  Medication Interventions: Teach patient to arise slowly   Pt remains free of falls. Comments: Patient has been sitting in the milieu socializing with peers and putting together puzzles for most of the day. When questioned about what brought him to the hospital he stated that he knew he'd drink if he went home so he came to the hospital. Patient states that he was just released from [facility] \"I was innocent but I still served 7 months. \" Patient denies SI/HI and AVH. Patient has been asking for copies of auto titles to be made and placed in his locker and now is requesting his EBT card so he can find out the balance. The cards were returned from security and they are with patient currently, although he states that he will put them in his locker later on after he has made his calls. Patient is pleasant but appears to be under the impression we are here to assist with his personal items rather than his recovery.

## 2018-01-13 NOTE — BH NOTES
GROUP THERAPY PROGRESS NOTE    Dillon Medel is participating in Leisure-Creative Group. Group time: 30 minutes    Personal goal for participation: Has a lot of things to deal with,trying to go   through some papers. Goal orientation: personal    Group therapy participation: minimal    Therapeutic interventions reviewed and discussed:     Impression of participation: Pt sat briefly in group due to doing a lot  of pacing back and forth in hallway. When questioned stated  He was just restless and was observed later playing game with peer or going through    some papers.

## 2018-01-14 LAB
GLUCOSE BLD STRIP.AUTO-MCNC: 113 MG/DL (ref 70–110)
GLUCOSE BLD STRIP.AUTO-MCNC: 151 MG/DL (ref 70–110)
GLUCOSE BLD STRIP.AUTO-MCNC: 182 MG/DL (ref 70–110)
GLUCOSE BLD STRIP.AUTO-MCNC: 260 MG/DL (ref 70–110)

## 2018-01-14 PROCEDURE — 74011636637 HC RX REV CODE- 636/637: Performed by: STUDENT IN AN ORGANIZED HEALTH CARE EDUCATION/TRAINING PROGRAM

## 2018-01-14 PROCEDURE — 82962 GLUCOSE BLOOD TEST: CPT

## 2018-01-14 PROCEDURE — 74011250637 HC RX REV CODE- 250/637: Performed by: NURSE PRACTITIONER

## 2018-01-14 PROCEDURE — 74011250637 HC RX REV CODE- 250/637: Performed by: STUDENT IN AN ORGANIZED HEALTH CARE EDUCATION/TRAINING PROGRAM

## 2018-01-14 PROCEDURE — 65220000003 HC RM SEMIPRIVATE PSYCH

## 2018-01-14 RX ORDER — ACYCLOVIR 200 MG/1
200 CAPSULE ORAL
Status: DISCONTINUED | OUTPATIENT
Start: 2018-01-14 | End: 2018-01-18 | Stop reason: HOSPADM

## 2018-01-14 RX ORDER — AMOXICILLIN 250 MG/1
500 CAPSULE ORAL EVERY 8 HOURS
Status: DISCONTINUED | OUTPATIENT
Start: 2018-01-14 | End: 2018-01-18 | Stop reason: HOSPADM

## 2018-01-14 RX ADMIN — QUETIAPINE 400 MG: 200 TABLET, EXTENDED RELEASE ORAL at 20:35

## 2018-01-14 RX ADMIN — ACETAMINOPHEN 500 MG: 500 TABLET ORAL at 21:16

## 2018-01-14 RX ADMIN — ACYCLOVIR 200 MG: 200 CAPSULE ORAL at 14:30

## 2018-01-14 RX ADMIN — ACYCLOVIR 200 MG: 200 CAPSULE ORAL at 17:48

## 2018-01-14 RX ADMIN — GABAPENTIN 300 MG: 300 CAPSULE ORAL at 20:35

## 2018-01-14 RX ADMIN — METFORMIN HYDROCHLORIDE 1000 MG: 500 TABLET, EXTENDED RELEASE ORAL at 16:41

## 2018-01-14 RX ADMIN — AMOXICILLIN 500 MG: 250 CAPSULE ORAL at 14:30

## 2018-01-14 RX ADMIN — BUSPIRONE HYDROCHLORIDE 15 MG: 5 TABLET ORAL at 20:35

## 2018-01-14 RX ADMIN — INSULIN LISPRO 2 UNITS: 100 INJECTION, SOLUTION INTRAVENOUS; SUBCUTANEOUS at 21:09

## 2018-01-14 RX ADMIN — METFORMIN HYDROCHLORIDE 1000 MG: 500 TABLET, EXTENDED RELEASE ORAL at 08:23

## 2018-01-14 RX ADMIN — AMOXICILLIN 500 MG: 250 CAPSULE ORAL at 21:09

## 2018-01-14 RX ADMIN — ACYCLOVIR 200 MG: 200 CAPSULE ORAL at 21:09

## 2018-01-14 RX ADMIN — GABAPENTIN 300 MG: 300 CAPSULE ORAL at 08:23

## 2018-01-14 RX ADMIN — BUSPIRONE HYDROCHLORIDE 15 MG: 5 TABLET ORAL at 08:23

## 2018-01-14 RX ADMIN — INSULIN LISPRO 6 UNITS: 100 INJECTION, SOLUTION INTRAVENOUS; SUBCUTANEOUS at 12:13

## 2018-01-14 NOTE — H&P
[facility] ADMIT NOTE -JOY Kathleen  MR#: [id number]  : 1960  ACCOUNT #: [de-identified]   DATE OF SERVICE: 2018    CHIEF COMPLAINT:  \"I need to stop drinking. \"    HISTORY OF PRESENT ILLNESS:  This is a 16-year-old  male, history of alcohol use disorder and depression and anxiety, presents to the hospital after being released from correction after a 7-month stay in correction and with desires to get in an alcohol rehab program.  He is not currently drinking, has not been drinking since going into correction 7 months ago. However, he has increase in cravings and he is concerned that he will relapse on alcohol. He has increased anxiety, also has some frustrations about his medications as Seroquel does control his anxiety but also makes him feel \"zonked out. \"  He denies suicidal or homicidal ideation at this time. Denies psychosis. No active signs and symptoms of rich. He is anxious, has racing thoughts and poor sleep at night due to his anxiety. Denies drug use. Denies alcohol use. PSYCHIATRIC HISTORY:  The patient has 1 history of overdosing on pills 6 years ago that led to him being in ICU stay. He has tried several different antidepressants and medications, but is most stable on Seroquel. He does not currently have a psychiatrist.  He has a diagnosis of anxiety as well as alcohol disorder. SUBSTANCE USE HISTORY:  Began drinking when he was 13years old. When he was 22 began drinking daily up to 6-8 drinks per day. MEDICAL HISTORY:  He has cirrhosis, diabetes, coronary artery disease, status post 2 MIs. MEDICATIONS:  See medical chart for full medical list.    ALLERGIES:  ALEVE, NAPROXEN. FAMILY HISTORY:  His mother had a history of alcohol use. His brother had a history of cocaine use. SOCIAL HISTORY:  He began drinking in his teens. He is currently unemployed. He is on disability. He is looking for work.   He is currently homeless as he is  from his wife and does not have a place to stay. He is on parole after 7-month penitentiary stay, just released on 01/11. Has 2 children, a 15year-old and a 26-year-old. VITAL SIGNS:  Stable. MENTAL STATUS EXAM:  The patient appears visibly anxious, has some shaking hands, speaking very quickly out of anxiety, somewhat fidgety and anxious with his behaviors. Mood is \"on edge. \"  Affect is anxious, congruent. Thoughts are linear. Denies suicidal or homicidal ideation. No psychosis present. Insight and judgment are fair. ASSESSMENT:  This is a 60-year-old  male, history of severe alcohol use disorder, presents with interest in rehab programs for his alcohol use. The patient has increasing cravings and significant history of alcohol use and would benefit from further rehab programs. DIAGNOSES:  1. Generalized anxiety disorder. 2.  Alcohol use disorder, severe. PLAN:    1. Admit to inpatient psychiatric unit. 2.  Restart BuSpar 15 mg twice daily. Restart Neurontin 300 mg twice daily. Restart medical medications. Restart Seroquel  mg once nightly. 3.  Social work to help with disposition, will need rehab placement at discharge. Discharge pending clinical stability. MD Nini Em  / Marco A Armas  D: 01/13/2018 11:40     T: 01/13/2018 12:15  JOB #: [id number]

## 2018-01-14 NOTE — BH NOTES
Per RN's request to evaluate pt's complaint of right ear pain and rash to buttocks X 1 day. CC: \"My right ear hurts and I have some loss of hearing to that ear.  The symptoms started yesterday\" I have history of ear wax impactions\"             \" I have rash to my buttocks the last couple of days\"    PE: Left ear-wnl         Right ear: Erythema noted around TM                          Inflammation noted to TM                          TTP                           No drainage noted                           Afebrile-97.3                Dx: Right Otitis Media    Treat: Amoxicillin 500 mg 1 po tid X 7 days        PE: vesicular rash noted on erythema base between buttocks          No fever          NTTP          History of HSV 1   Suspect he has flare up of HSV    Treat with Acyclovir

## 2018-01-14 NOTE — PROGRESS NOTES
Psychiatric Progress Note    Date: 18  Patient Name: Virginia Arzola  : 1960  MRN: [id number]  Hospital Day: 3      INTERVAL HISTORY:   Patient is still somewhat anxious today, admits to poor sleep last night. Also feels worrisome about the future and has unresolved questions about discharge plans. Not suicidal/ homicidal at this time. MENTAL STATUS EXAM:  Appearance: Dressed in casual clothes with fair grooming and hygiene  Behavior: Cooperative with good eye contact  Motor: +psychomotor agitation  Speech: Normal rate, tone and volume  Mood: \"anxious\"  Affect: anxious  Thought Process: linear, goal-directed  Thought Content: Denies SI and HI  Perception: Denies AH or VH  Concentration: fair  Memory: fair  Cognition: Alert and oriented  Insight: Fair  Judgment: Fair    RISK ASSESSMENT:   Prior Attempts: no noted prior  Lethality of Attempts: none noted prior  Current Ideation/Plan: denies  Protective Factors: limited  Future Orientation: limited    ASSESSMENT: Sleep poor last night, somewhat anxious    Diagnoses:  Alcohol use disorder, severe  Generalized anxiety disorder    Plan:  1. Continue with inpatient psychiatric treatment for containment, stabilization and medication management  2. Patient is to continue with group therapy  3. Medications:  seroquel xr 400 mg po nightly  4. SW to help with disposition  5.  ELOS 5-7 days

## 2018-01-14 NOTE — PROGRESS NOTES
Problem: Suicide/Homicide (Adult/Pediatric)  Goal: *STG: Remains safe in hospital  Patient will remain free of harm to self and others every shift throughout hospitalization. Outcome: Progressing Towards Goal  No unsafe behaviors  Goal: *STG/LTG: Complies with medication therapy  Patient will take medication as prescribed every shift throughout hospital stay. Outcome: Progressing Towards Goal  Compliant with medications    Problem: Falls - Risk of  Goal: *Absence of Falls  Patient will remain free of falls every shift throughout hospital stay. Patient will verbalize understanding regarding safety and fall prevention measures to be utilized every shift throughout hospital stay. Document Ida Dela Cruz Fall Risk and appropriate interventions in the flowsheet. Outcome: Progressing Towards Goal  Fall Risk Interventions:  Medication Interventions: Teach patient to arise slowly  No falls reported/observed    Comments: Patient ate breakfast and lunch. He was compliant with his medications. He denied SI/HI/AVH. He stated his mood was \"pretty good. \" He stated he \"slept pretty solid until about 5 am.\" Patient is calm, cooperative and compliant with the unit rules and protocols and interacting minimally with select peers, staff and nurses. Will remain on suicide precautions. Staff will continue to monitor q15 minutes for safety. Staff and nurses will continue to provide a safe and supportive environment.

## 2018-01-15 LAB
GLUCOSE BLD STRIP.AUTO-MCNC: 147 MG/DL (ref 70–110)
GLUCOSE BLD STRIP.AUTO-MCNC: 214 MG/DL (ref 70–110)
GLUCOSE BLD STRIP.AUTO-MCNC: 235 MG/DL (ref 70–110)
GLUCOSE BLD STRIP.AUTO-MCNC: 262 MG/DL (ref 70–110)
HBA1C MFR BLD: 5.2 % (ref 4.2–5.6)

## 2018-01-15 PROCEDURE — 83036 HEMOGLOBIN GLYCOSYLATED A1C: CPT | Performed by: STUDENT IN AN ORGANIZED HEALTH CARE EDUCATION/TRAINING PROGRAM

## 2018-01-15 PROCEDURE — 65220000003 HC RM SEMIPRIVATE PSYCH

## 2018-01-15 PROCEDURE — 74011250637 HC RX REV CODE- 250/637: Performed by: STUDENT IN AN ORGANIZED HEALTH CARE EDUCATION/TRAINING PROGRAM

## 2018-01-15 PROCEDURE — 74011636637 HC RX REV CODE- 636/637: Performed by: STUDENT IN AN ORGANIZED HEALTH CARE EDUCATION/TRAINING PROGRAM

## 2018-01-15 PROCEDURE — 36415 COLL VENOUS BLD VENIPUNCTURE: CPT | Performed by: STUDENT IN AN ORGANIZED HEALTH CARE EDUCATION/TRAINING PROGRAM

## 2018-01-15 PROCEDURE — 74011250637 HC RX REV CODE- 250/637: Performed by: NURSE PRACTITIONER

## 2018-01-15 PROCEDURE — 82962 GLUCOSE BLOOD TEST: CPT

## 2018-01-15 RX ADMIN — INSULIN LISPRO 6 UNITS: 100 INJECTION, SOLUTION INTRAVENOUS; SUBCUTANEOUS at 11:30

## 2018-01-15 RX ADMIN — QUETIAPINE 400 MG: 200 TABLET, EXTENDED RELEASE ORAL at 20:42

## 2018-01-15 RX ADMIN — METFORMIN HYDROCHLORIDE 1000 MG: 500 TABLET, EXTENDED RELEASE ORAL at 08:28

## 2018-01-15 RX ADMIN — GABAPENTIN 300 MG: 300 CAPSULE ORAL at 08:29

## 2018-01-15 RX ADMIN — GABAPENTIN 300 MG: 300 CAPSULE ORAL at 20:41

## 2018-01-15 RX ADMIN — ACYCLOVIR 200 MG: 200 CAPSULE ORAL at 13:41

## 2018-01-15 RX ADMIN — INSULIN LISPRO 4 UNITS: 100 INJECTION, SOLUTION INTRAVENOUS; SUBCUTANEOUS at 16:50

## 2018-01-15 RX ADMIN — BUSPIRONE HYDROCHLORIDE 15 MG: 5 TABLET ORAL at 08:29

## 2018-01-15 RX ADMIN — ACYCLOVIR 200 MG: 200 CAPSULE ORAL at 21:06

## 2018-01-15 RX ADMIN — INSULIN LISPRO 4 UNITS: 100 INJECTION, SOLUTION INTRAVENOUS; SUBCUTANEOUS at 21:34

## 2018-01-15 RX ADMIN — AMOXICILLIN 500 MG: 250 CAPSULE ORAL at 13:41

## 2018-01-15 RX ADMIN — ACYCLOVIR 200 MG: 200 CAPSULE ORAL at 17:20

## 2018-01-15 RX ADMIN — ACYCLOVIR 200 MG: 200 CAPSULE ORAL at 11:44

## 2018-01-15 RX ADMIN — AMOXICILLIN 500 MG: 250 CAPSULE ORAL at 06:40

## 2018-01-15 RX ADMIN — METFORMIN HYDROCHLORIDE 1000 MG: 500 TABLET, EXTENDED RELEASE ORAL at 17:22

## 2018-01-15 RX ADMIN — ACYCLOVIR 200 MG: 200 CAPSULE ORAL at 06:40

## 2018-01-15 RX ADMIN — BUSPIRONE HYDROCHLORIDE 15 MG: 5 TABLET ORAL at 20:40

## 2018-01-15 RX ADMIN — AMOXICILLIN 500 MG: 250 CAPSULE ORAL at 22:00

## 2018-01-15 NOTE — BSMART NOTE
SOCIAL WORK GROUP THERAPY PROGRESS NOTE    Group Time:     1:15pm    Group Topic:  Coping Skills        Group Participation:    Pt moderately involved during group discussion but remained attentive. Left early to meet with TREV FRACNO Interested in [facility]. Handouts given. Discussion included the process of making \"Change\" by answering questions on handout with an emphasis on strengths & weaknesses to support improving one's self esteem. Taught client about relationship between mood disorders & self medicating them. Reviewed strategies to keep a \"Journal\" for moods, cognitions, behavior & outcome.

## 2018-01-15 NOTE — PROGRESS NOTES
Problem: Suicide/Homicide (Adult/Pediatric)  Goal: *STG: Remains safe in hospital  Patient will remain free of harm to self and others every shift throughout hospitalization. Outcome: Progressing Towards Goal  Patient contracted to safety this shift  Goal: *STG: Seeks staff when feelings of self harm or harm towards others arise  Patient will be successful in seeking staff support and remaining free of harm to self and others daily throughout hospitalization. Outcome: Progressing Towards Goal  Patient denies thoughts to harm self or others  Goal: *STG: Attends activities and groups  Patient will attend at least 50% or 2 of 4 groups daily throughout hospital stay. Outcome: Progressing Towards Goal  Patient participated in groups this shift  Goal: *STG/LTG: Complies with medication therapy  Patient will take medication as prescribed every shift throughout hospital stay. Outcome: Progressing Towards Goal  Patient received medications as prescribed    Comments: Patient appeared dull but cooperative, however he contracted to safety. Patient stated that his goal today is to take his shower, he completed his hygiene, ate 100% dinner, participated in groups this evening. Patient received medications as prescribed and utilized non skid footwear for safety.  Will continue to monitor

## 2018-01-15 NOTE — PROGRESS NOTES
Psychiatric Progress Note    Date: 01/15/18  Patient Name: José Miguel Rivas  : 1960  MRN: [id number]  Hospital Day: 4      INTERVAL HISTORY:   Patient is very anxious this morning, frustrated about being in nursing home \"for no good reason\" and observed filtering through paperwork and his belongings to \"get himself taken care of.\" He says he's on disability \"because I can't handle stress\" and now frustrated because he is \"under too much stress. \"  Not suicidal/ homicidal at this time. MENTAL STATUS EXAM:  Appearance: Dressed in casual clothes with fair grooming and hygiene  Behavior: Cooperative with good eye contact  Motor: +psychomotor agitation  Speech: Normal rate, tone and volume  Mood: \"anxious\"  Affect: anxious  Thought Process: linear, goal-directed  Thought Content: Denies SI and HI  Perception: Denies AH or VH  Concentration: fair  Memory: fair  Cognition: Alert and oriented  Insight: Fair  Judgment: Fair    RISK ASSESSMENT:   Prior Attempts: no noted prior  Lethality of Attempts: none noted prior  Current Ideation/Plan: denies  Protective Factors: limited  Future Orientation: limited    ASSESSMENT: Sleep poor last night, very anxious    Diagnoses:  Alcohol use disorder, severe  Generalized anxiety disorder    Plan:  1. Continue with inpatient psychiatric treatment for containment, stabilization and medication management  2. Patient is to continue with group therapy  3. Medications:  seroquel xr 400 mg po nightly  4. SW to help with disposition  5.  ELOS 5-7 days

## 2018-01-15 NOTE — BSMART NOTE
SW assessment/Intervention:  SW made contact with patient to complete initial interview. Patient was attentive and focused on the purpose of interview with redirection. Patient a 65year old  male hospitalized for suicidal ideations. Patient appeared anxious during interview as he was unable to focus. Patient addressed incident with his wife which appears to have caused major stress within his life. Patient addressed finances, his alcoholism as well as his wife's drug use in a single sentence. Patient addressed his inability to read and reports he completed the 10th grade. Patient reports he receives disability however, is willing to work when he is able. Patient reports he was released [facility] the [facility] on Thursday an was admitted for psychiatric treatment on Friday. Patient reports his history of alcoholism has caused major stress within his life. Patient is interested in treatment however, would like to remain local due to pending court dates. Patient denies SI/HI at this present time. SW will assist patient with seeking treatment. SW encouraged group activities as well as self reflection. Plan:  SW will continue to monitor during hospitalization. SW will initiate disposition process following recommendations made by Dr. Iker Gamez.     ANDREW Wells, LCSW-E    [phone]

## 2018-01-15 NOTE — BH NOTES
ROB FRAGOSO Notes: pt has been in in the milieu interacting with staff and peers. Pt has tolerated all meals, medications and completed ADL's on shift. Pt has participated in groups on shift and expressed wanting to speak with sw in which he was able to. Pt stated and agreed to come to staff if he contracts for safety. Staff will continue to monitor for safety precautions and locations.

## 2018-01-15 NOTE — BH NOTES
GROUP THERAPY PROGRESS NOTE    Jose Blanco is participating in Goals Group. Group time: 30 minutes    Personal goal for participation: wanting to talk with   Pt has continued to work on some important papers he says he has to complete   and has also been making phone calls.

## 2018-01-15 NOTE — BSMART NOTE
OCCUPATIONAL THERAPY PROGRESS NOTE  Group Time:  9234  Attendance: The patient attended full group. Participation:  The patient participated fully in the activity. Attention:  The patient was able to focus on the activity. Interaction:  The patient acknowledges others or responds to questions,  with no spontaneous interaction. Talkative about drinking, wife and all she has \"done to him\" with little acknowledgement of any of own behaviors other than drinking. Did say he wanted long term treatment and discussed various places. Marbella Acharya he would like to stay on [facility] [employer] because their probation is easier to navigate.

## 2018-01-15 NOTE — BH NOTES
GROUP THERAPY PROGRESS NOTE    Brenda Latif is not participating in Recreational Therapy, despite staff encouragement. Pt chose to relax on the unit and watch television.

## 2018-01-15 NOTE — BH NOTES
Patient has been in milieu calm and cooperative. He denies suicidal/homicidal ideations. He complained of \"Hurting all over\". He ate dinner, snack and medication compliant. Nursing will continue to provide a safe and supportive environment.

## 2018-01-15 NOTE — DIABETES MGMT
GLYCEMIC CONTROL PLAN OF CARE    Assessment:  Pt is 65 yr old male admitted on 1/12/18 for help with ETOH cravings. Pt with past medical history significant for ETOH use disorder, generalized anxiety disorder, T2DM. Recommendations:  Continue current diabetes medications. Will continue to monitor inpatient for intervention.     Recent Glucose Results:   Lab Results   Component Value Date/Time    GLUCPOC 147 (H) 01/15/2018 06:41 AM    GLUCPOC 182 (H) 01/14/2018 08:40 PM    GLUCPOC 113 (H) 01/14/2018 04:14 PM     Current A1C:  None- new one pending    Current hospital diabetes medications:  1000 mg metformin extended release two times daily  Correctional lispro ACHS- normal insulin sensitivity scale    Previous day Insulin TDD:  1/14: 8 units lispro    Diet:   Diabetic consistent carbohydrate with morning snack, lunch snack and hs snack    Home diabetes medications:  1000 mg metformin two times daily with meals  Goals:  Pt BG will be within target range by _1/18/17____    Education:  ___  Refer to Diabetes Education Record             _x__  Education not indicated at this time    Sawyer [address], [address] [place] [address], [facility] [place]  Pager: [phone]

## 2018-01-16 LAB
GLUCOSE BLD STRIP.AUTO-MCNC: 127 MG/DL (ref 70–110)
GLUCOSE BLD STRIP.AUTO-MCNC: 177 MG/DL (ref 70–110)
GLUCOSE BLD STRIP.AUTO-MCNC: 193 MG/DL (ref 70–110)
GLUCOSE BLD STRIP.AUTO-MCNC: 220 MG/DL (ref 70–110)

## 2018-01-16 PROCEDURE — 74011636637 HC RX REV CODE- 636/637: Performed by: STUDENT IN AN ORGANIZED HEALTH CARE EDUCATION/TRAINING PROGRAM

## 2018-01-16 PROCEDURE — 74011250637 HC RX REV CODE- 250/637: Performed by: STUDENT IN AN ORGANIZED HEALTH CARE EDUCATION/TRAINING PROGRAM

## 2018-01-16 PROCEDURE — 90471 IMMUNIZATION ADMIN: CPT

## 2018-01-16 PROCEDURE — 74011250637 HC RX REV CODE- 250/637: Performed by: NURSE PRACTITIONER

## 2018-01-16 PROCEDURE — 65220000003 HC RM SEMIPRIVATE PSYCH

## 2018-01-16 PROCEDURE — 82962 GLUCOSE BLOOD TEST: CPT

## 2018-01-16 PROCEDURE — 74011250636 HC RX REV CODE- 250/636: Performed by: STUDENT IN AN ORGANIZED HEALTH CARE EDUCATION/TRAINING PROGRAM

## 2018-01-16 PROCEDURE — 90686 IIV4 VACC NO PRSV 0.5 ML IM: CPT | Performed by: STUDENT IN AN ORGANIZED HEALTH CARE EDUCATION/TRAINING PROGRAM

## 2018-01-16 RX ORDER — QUETIAPINE FUMARATE 200 MG/1
400 TABLET, FILM COATED ORAL
Status: DISCONTINUED | OUTPATIENT
Start: 2018-01-16 | End: 2018-01-18 | Stop reason: HOSPADM

## 2018-01-16 RX ORDER — QUETIAPINE FUMARATE 25 MG/1
50 TABLET, FILM COATED ORAL
Status: DISCONTINUED | OUTPATIENT
Start: 2018-01-16 | End: 2018-01-18 | Stop reason: HOSPADM

## 2018-01-16 RX ADMIN — INSULIN LISPRO 4 UNITS: 100 INJECTION, SOLUTION INTRAVENOUS; SUBCUTANEOUS at 17:15

## 2018-01-16 RX ADMIN — GABAPENTIN 300 MG: 300 CAPSULE ORAL at 08:17

## 2018-01-16 RX ADMIN — INSULIN LISPRO 2 UNITS: 100 INJECTION, SOLUTION INTRAVENOUS; SUBCUTANEOUS at 08:18

## 2018-01-16 RX ADMIN — QUETIAPINE FUMARATE 400 MG: 200 TABLET ORAL at 20:29

## 2018-01-16 RX ADMIN — BUSPIRONE HYDROCHLORIDE 15 MG: 5 TABLET ORAL at 08:17

## 2018-01-16 RX ADMIN — AMOXICILLIN 500 MG: 250 CAPSULE ORAL at 13:53

## 2018-01-16 RX ADMIN — ACYCLOVIR 200 MG: 200 CAPSULE ORAL at 06:40

## 2018-01-16 RX ADMIN — ACYCLOVIR 200 MG: 200 CAPSULE ORAL at 21:09

## 2018-01-16 RX ADMIN — ACYCLOVIR 200 MG: 200 CAPSULE ORAL at 13:51

## 2018-01-16 RX ADMIN — ACYCLOVIR 200 MG: 200 CAPSULE ORAL at 17:15

## 2018-01-16 RX ADMIN — AMOXICILLIN 500 MG: 250 CAPSULE ORAL at 21:09

## 2018-01-16 RX ADMIN — ACYCLOVIR 200 MG: 200 CAPSULE ORAL at 11:14

## 2018-01-16 RX ADMIN — METFORMIN HYDROCHLORIDE 1000 MG: 500 TABLET, EXTENDED RELEASE ORAL at 17:15

## 2018-01-16 RX ADMIN — AMOXICILLIN 500 MG: 250 CAPSULE ORAL at 06:14

## 2018-01-16 RX ADMIN — BUSPIRONE HYDROCHLORIDE 15 MG: 5 TABLET ORAL at 20:30

## 2018-01-16 RX ADMIN — INSULIN LISPRO 2 UNITS: 100 INJECTION, SOLUTION INTRAVENOUS; SUBCUTANEOUS at 12:29

## 2018-01-16 RX ADMIN — INFLUENZA VIRUS VACCINE 0.5 ML: 15; 15; 15; 15 SUSPENSION INTRAMUSCULAR at 11:13

## 2018-01-16 RX ADMIN — METFORMIN HYDROCHLORIDE 1000 MG: 500 TABLET, EXTENDED RELEASE ORAL at 08:17

## 2018-01-16 RX ADMIN — GABAPENTIN 300 MG: 300 CAPSULE ORAL at 20:29

## 2018-01-16 NOTE — DIABETES MGMT
GLYCEMIC CONTROL PLAN OF CARE    Assessment/Recommendations:  Pt BG trending above target range. Advance to very insulin resistant scale lispro. Monitor for need of basal insulin. Pt had someone bring in ice cream, snacks and beverages for everyone. Pt aware of nutrition label but is not eating portion sizes and is still eating meals provided. Will continue to monitor inpatient for intervention.     Recent Glucose Results:   Lab Results   Component Value Date/Time    GLUCPOC 193 (H) 01/16/2018 11:15 AM    GLUCPOC 177 (H) 01/16/2018 08:01 AM    GLUCPOC 235 (H) 01/15/2018 07:40 PM       Current hospital diabetes medications:  Correctional lispro ACHS- normal insulin sensitivity scale  Metformin er 1000 mg two times daily with meals    Previous day Insulin TDD:  1/15: 14 units lispro    Goals:  Pt BG will be within target range by _1/19/18____    Education:  _x__  Refer to Diabetes Education Record             ___  Education not indicated at this time    Sawyer [address], [address] [place] [address], [facility] [place]  Pager: [phone]

## 2018-01-16 NOTE — DIABETES MGMT
Diabetes Patient/Family Education Record    Pt paid to have someone bring in ice cream, instant tea, coffee, cocoa and snacks for him and everybody else. Factors That  May Influence Patients Ability  to Learn or  Comply with Recommendations   []   Language barrier    []   Cultural needs   [x]   Motivation    []   Cognitive limitation    []   Physical   [x]   Education    []   Physiological factors   []   Hearing/vision/speaking impairment   []   Quaker beliefs    []   Financial factors   []  Other:   []  No factors identified at this time.      Person Instructed:   [x]   Patient   []   Family   []  Other     Preference for Learning:   [x]   Verbal   [x]   Written   []  Demonstration     Level of Comprehension & Competence:    []  Good                                      [x] Fair                                     []  Poor                             [x]  Needs Reinforcement   [x]  Teachback completed    Education Component:   []  Medication management, including how to administer insulin (if appropriate) and potential medication interactions    [x]  Nutritional management    []  Exercise   [x]  Signs, symptoms, and treatment of hyperglycemia and hypoglycemia   [x] Prevention, recognition and treatment of hyperglycemia and hypoglycemia   [x]  Importance of blood glucose monitoring and how to obtain a blood glucose meter    []  Instruction on use of the blood glucose meter   [x]  Discuss the importance of HbA1C monitoring    []  Sick day guidelines   []  Proper use and disposal of lancets, needles, syringes or insulin pens (if appropriate)   [x]  Potential long-term complications (retinopathy, kidney disease, neuropathy, foot care)   [x] Information about whom to contact in case of emergency or for more information    []  Goal:  Patient/family will demonstrate understanding of Diabetes Self Management Skills by: (date) _1/23/18______  Plan for post-discharge education or self-management support:    [] Outpatient class schedule provided            [x] Patient Declined    [] Scheduled for outpatient classes (date) _______     Sherif Gray MS, RD, CDE  Pager: [phone]

## 2018-01-16 NOTE — BH NOTES
GROUP THERAPY PROGRESS NOTE    Damari Multani is participating in Recreational Therapy. Group time: 1 hour    Personal goal for participation: Socialization    Goal orientation: social    Group therapy participation: active    Therapeutic interventions reviewed and discussed: Stress relief through socialization and play    Impression of participation: Patient was a great contribution to group.

## 2018-01-16 NOTE — PROGRESS NOTES
Problem: Suicide/Homicide (Adult/Pediatric)  Goal: *STG: Remains safe in hospital  Patient will remain free of harm to self and others every shift throughout hospitalization. Outcome: Progressing Towards Goal  No unsafe behaviors  Goal: *STG/LTG: Complies with medication therapy  Patient will take medication as prescribed every shift throughout hospital stay. Outcome: Progressing Towards Goal  Compliant with medications    Problem: Falls - Risk of  Goal: *Absence of Falls  Patient will remain free of falls every shift throughout hospital stay. Patient will verbalize understanding regarding safety and fall prevention measures to be utilized every shift throughout hospital stay. Document Carmel Lanier Fall Risk and appropriate interventions in the flowsheet. Outcome: Progressing Towards Goal  Fall Risk Interventions:  Medication Interventions: Teach patient to arise slowly  No falls reported/observed    Comments: Patient ate breakfast and lunch. He was compliant with medications. He signed JOSELYN for Jose Leblanc to obtain medical records. He denies SI/HI/AVH. Patient is calm, cooperative and compliant with the unit rules and protocols and interacting minimally with select peers, staff and nurses. Will remain on suicide precautions. Staff will continue to monitor q15 minutes for safety. Staff and nurses will continue to provide a safe and supportive environment.

## 2018-01-16 NOTE — BSMART NOTE
SOCIAL WORK GROUP THERAPY PROGRESS NOTE    Group Time:  11:15am     Group Topic:  Coping Skills  &  C D Issues    Group Participation:      Pt moderately involved during group discussion but remained attentive. Read parts of handout. When we discussed \"planning\" he shared his goal to go to [employer]. asked related questions. Was supportive of peer. Part of session was on the variety of \"normal\" physical & emotional recovery symptoms that can possibly be experienced for several months and even up to (2) yrs. \"Seven Steps\" for taking responsibility for our Happiness was reviewed including commitment to change, self-care, setting limits, goal setting & letting go. Taught client about relationship between mood disorders & self medicating them.

## 2018-01-16 NOTE — PROGRESS NOTES
Psychiatric Progress Note    Date: 18  Patient Name: Gabby Moran  : 1960  MRN: [id number]  Hospital Day: 5      INTERVAL HISTORY:   Patient is very drowsy, observed lying in bed. He says that he's been sleeping more during the day, but can't sleep at night. He says he never had this problem on the seroquel instant release. Psychoeducation provided regarding different formulations of medications. Pt requested to return to his dose of seroquel that he's been on in the past for nighttime. Despite drowsiness, patient continues to endorse anxiety during the day, though not at this time. Not suicidal/ homicidal at this time. MENTAL STATUS EXAM:  Appearance: Dressed in casual clothes with fair grooming and hygiene  Behavior: drowsy, with good eye contact  Motor: +psychomotor retardation  Speech: Normal rate, tone and volume  Mood: \"I'm tired\"  Affect: drowsy  Thought Process: linear, goal-directed  Thought Content: Denies SI and HI  Perception: Denies AH or VH  Concentration: fair  Memory: fair  Cognition: Alert and oriented  Insight: Fair  Judgment: Fair    RISK ASSESSMENT:   Prior Attempts: no noted prior  Lethality of Attempts: none noted prior  Current Ideation/Plan: denies  Protective Factors: limited  Future Orientation: limited    ASSESSMENT: Sleep poor last night, daytime drowsiness due to medication side effects, still very anxious-- will be adjusting medications today to account for side effects    Diagnoses:  Alcohol use disorder, severe  Generalized anxiety disorder    Plan:  1. Continue with inpatient psychiatric treatment for containment, stabilization and medication management  2. Patient is to continue with group therapy  3. Medications:  D/c seroquel xr 400 mg po nightly  Start seroquel 400 mg po nightly  Start seroquel 50 mg po tid prn for anxiety during the day  4. SW to help with disposition  5.  ELOS 5-7 days

## 2018-01-16 NOTE — BH NOTES
GROUP THERAPY PROGRESS NOTE    Judith Daniel is not  participating in [place]. Group time: 35 minutes    Personal goal for participation: rules/ regulations    Goal orientation: community    Group therapy participation: minimal    Therapeutic interventions reviewed and discussed: Pt was educated with the rules after group he was receptive of the rules.     Impression of participation: pt was in with doctor while group was in session

## 2018-01-17 LAB
GLUCOSE BLD STRIP.AUTO-MCNC: 123 MG/DL (ref 70–110)
GLUCOSE BLD STRIP.AUTO-MCNC: 137 MG/DL (ref 70–110)
GLUCOSE BLD STRIP.AUTO-MCNC: 223 MG/DL (ref 70–110)

## 2018-01-17 PROCEDURE — 74011250637 HC RX REV CODE- 250/637: Performed by: NURSE PRACTITIONER

## 2018-01-17 PROCEDURE — 65220000003 HC RM SEMIPRIVATE PSYCH

## 2018-01-17 PROCEDURE — 74011250637 HC RX REV CODE- 250/637: Performed by: STUDENT IN AN ORGANIZED HEALTH CARE EDUCATION/TRAINING PROGRAM

## 2018-01-17 PROCEDURE — 82962 GLUCOSE BLOOD TEST: CPT

## 2018-01-17 PROCEDURE — 74011636637 HC RX REV CODE- 636/637: Performed by: STUDENT IN AN ORGANIZED HEALTH CARE EDUCATION/TRAINING PROGRAM

## 2018-01-17 RX ORDER — BUSPIRONE HYDROCHLORIDE 15 MG/1
15 TABLET ORAL 2 TIMES DAILY
Qty: 60 TAB | Refills: 0 | Status: SHIPPED | OUTPATIENT
Start: 2018-01-17 | End: 2018-02-14

## 2018-01-17 RX ORDER — QUETIAPINE FUMARATE 400 MG/1
400 TABLET, FILM COATED ORAL
Qty: 30 TAB | Refills: 0 | Status: SHIPPED | OUTPATIENT
Start: 2018-01-17 | End: 2018-02-14

## 2018-01-17 RX ORDER — QUETIAPINE FUMARATE 50 MG/1
50 TABLET, FILM COATED ORAL
Qty: 30 TAB | Refills: 0 | Status: SHIPPED | OUTPATIENT
Start: 2018-01-17 | End: 2018-02-14

## 2018-01-17 RX ORDER — GABAPENTIN 300 MG/1
300 CAPSULE ORAL 2 TIMES DAILY
Qty: 60 CAP | Refills: 0 | Status: SHIPPED | OUTPATIENT
Start: 2018-01-17 | End: 2018-02-14

## 2018-01-17 RX ADMIN — GABAPENTIN 300 MG: 300 CAPSULE ORAL at 20:16

## 2018-01-17 RX ADMIN — GABAPENTIN 300 MG: 300 CAPSULE ORAL at 09:11

## 2018-01-17 RX ADMIN — ACYCLOVIR 200 MG: 200 CAPSULE ORAL at 07:03

## 2018-01-17 RX ADMIN — INSULIN LISPRO 4 UNITS: 100 INJECTION, SOLUTION INTRAVENOUS; SUBCUTANEOUS at 12:15

## 2018-01-17 RX ADMIN — AMOXICILLIN 500 MG: 250 CAPSULE ORAL at 21:08

## 2018-01-17 RX ADMIN — ACYCLOVIR 200 MG: 200 CAPSULE ORAL at 21:08

## 2018-01-17 RX ADMIN — ACYCLOVIR 200 MG: 200 CAPSULE ORAL at 13:48

## 2018-01-17 RX ADMIN — METFORMIN HYDROCHLORIDE 1000 MG: 500 TABLET, EXTENDED RELEASE ORAL at 09:11

## 2018-01-17 RX ADMIN — AMOXICILLIN 500 MG: 250 CAPSULE ORAL at 07:03

## 2018-01-17 RX ADMIN — ACYCLOVIR 200 MG: 200 CAPSULE ORAL at 11:25

## 2018-01-17 RX ADMIN — QUETIAPINE FUMARATE 400 MG: 200 TABLET ORAL at 20:16

## 2018-01-17 RX ADMIN — BUSPIRONE HYDROCHLORIDE 15 MG: 5 TABLET ORAL at 20:16

## 2018-01-17 RX ADMIN — BUSPIRONE HYDROCHLORIDE 15 MG: 5 TABLET ORAL at 09:11

## 2018-01-17 RX ADMIN — AMOXICILLIN 500 MG: 250 CAPSULE ORAL at 13:48

## 2018-01-17 NOTE — BSMART NOTE
CARLA assessment/Intervention:  Patient continues to appear anxious about disposition. Patient informed writer that he is struggling with finances and is unable to return to his home due to his wife's restraining order. Patient reports he has a [employer] who is willing to assist him with housing and he will contact the  for transportation. SW encouraged patient to handle his personal affairs and this will possibly relieve some stress. SW contacted [facility] and scheduled follow up appointment.  informed this writer that patient had declined services in the past and was scheduled for an appointment on 1/9/18 which he refused. SW will ensure patient is understanding of keeping scheduled appointment in order to receive assistance with resources, med management, counseling and housing. Plan:  Complete disposition.     ANDREW Leung, LCSW-E    [phone]

## 2018-01-17 NOTE — BH NOTES
GROUP THERAPY PROGRESS NOTE    Annelevon Rosales was encouraged by staff but refused to participate in  Community.

## 2018-01-17 NOTE — DISCHARGE INSTRUCTIONS
BEHAVIORAL HEALTH NURSING DISCHARGE NOTE      Emergency Numbers    : [facility] Emergency Services: [phone]  Suicide Prevention Line: [phone] (TALK)      The following personal items collected during your admission are returned to you:   Dental Appliance: Dental Appliances: None  Vision:    Hearing Aid:    Jewelry: Jewelry: None  Clothing: Clothing: Footwear, Shorts, Shirt  Other Valuables: Other Valuables: Cell Phone (locker 126/1)  Valuables sent to safe: Personal Items Sent to Safe: va ebt, bank of [place] card, [employer] visa        The discharge information has been reviewed with the patient. The patient verbalized understanding. [facility] Activation    Thank you for requesting access to [facility]. Please follow the instructions below to securely access and download your online medical record. [facility] allows you to send messages to your doctor, view your test results, renew your prescriptions, schedule appointments, and more. How Do I Sign Up? In your internet browser, go to [email or web address]  Click on the First Time User? Click Here link in the Sign In box. You will be redirect to the New Member Sign Up page. Enter your [facility] Access Code exactly as it appears below. You will not need to use this code after youve completed the sign-up process. If you do not sign up before the expiration date, you must request a new code. [facility] Access Code: [email or web address]  Expires: 4/15/2018 10:45 AM (This is the date your [facility] access code will )    Enter the last four digits of your Social Security Number (xxxx) and Date of Birth (mm/dd/yyyy) as indicated and click Submit. You will be taken to the next sign-up page. Create a [facility] ID. This will be your [facility] login ID and cannot be changed, so think of one that is secure and easy to remember. Create a [facility] password. You can change your password at any time.   Enter your Password Reset Question and Answer. This can be used at a later time if you forget your password. Enter your e-mail address. You will receive e-mail notification when new information is available in [address] Click Sign Up. You can now view and download portions of your medical record. Click the [employer] link to download a portable copy of your medical information. Additional Information    If you have questions, please visit the Frequently Asked Questions section of the [employer] website at [email or web address] Remember, [facility] is NOT to be used for urgent needs. For medical emergencies, dial 911.     Patient armband removed and shredded

## 2018-01-17 NOTE — BH NOTES
Patient has been focused on finding a place to stay and handling his personal business to prepare for discharge. Patient has conversed extensively with staff and peers regarding his wife and her actions as a heroin addict. Patient has been friendly calm and cooperative. Staff will continue to monitor for health safety and improvement.

## 2018-01-18 VITALS
SYSTOLIC BLOOD PRESSURE: 159 MMHG | DIASTOLIC BLOOD PRESSURE: 98 MMHG | TEMPERATURE: 96.6 F | HEIGHT: 71 IN | BODY MASS INDEX: 26.6 KG/M2 | HEART RATE: 76 BPM | WEIGHT: 190 LBS | RESPIRATION RATE: 18 BRPM

## 2018-01-18 LAB
GLUCOSE BLD STRIP.AUTO-MCNC: 122 MG/DL (ref 70–110)
GLUCOSE BLD STRIP.AUTO-MCNC: 266 MG/DL (ref 70–110)

## 2018-01-18 PROCEDURE — 74011250637 HC RX REV CODE- 250/637: Performed by: STUDENT IN AN ORGANIZED HEALTH CARE EDUCATION/TRAINING PROGRAM

## 2018-01-18 PROCEDURE — 74011250637 HC RX REV CODE- 250/637: Performed by: NURSE PRACTITIONER

## 2018-01-18 PROCEDURE — 82962 GLUCOSE BLOOD TEST: CPT

## 2018-01-18 RX ADMIN — BUSPIRONE HYDROCHLORIDE 15 MG: 5 TABLET ORAL at 09:05

## 2018-01-18 RX ADMIN — GABAPENTIN 300 MG: 300 CAPSULE ORAL at 09:06

## 2018-01-18 RX ADMIN — ACYCLOVIR 200 MG: 200 CAPSULE ORAL at 13:29

## 2018-01-18 RX ADMIN — AMOXICILLIN 500 MG: 250 CAPSULE ORAL at 13:29

## 2018-01-18 RX ADMIN — ACYCLOVIR 200 MG: 200 CAPSULE ORAL at 06:45

## 2018-01-18 RX ADMIN — AMOXICILLIN 500 MG: 250 CAPSULE ORAL at 06:45

## 2018-01-18 RX ADMIN — METFORMIN HYDROCHLORIDE 1000 MG: 500 TABLET, EXTENDED RELEASE ORAL at 09:05

## 2018-01-18 NOTE — BH NOTES
Patient was discharged off unit with his belongings, discharge paperwork and prescriptions. Patient was put into a Medicaid cab that finally showed up.  Patient was being taken to [address] in [place] at his request.

## 2018-01-18 NOTE — BSMART NOTE
SOCIAL WORK GROUP THERAPY PROGRESS NOTE    Group Time:  11:15am    Group Topic:  Coping Skills    Group Participation:     Pt expressed not interested in attending session despite staff support. He did sit off to the side, took handout on \"change\", including strengths / weaknesses but contributed nothing.

## 2018-01-18 NOTE — BH NOTES
GROUP THERAPY PROGRESS NOTE    Jose G Atkins is participating in [place].      Group time: {Time; 15 min - 8 hours:43777}    Personal goal for participation: ***    Goal orientation: {GROUP ORIENTATION:35611641}    Group therapy participation: {GROUP THERAPY PARTICIPATION:[id number]}    Therapeutic interventions reviewed and discussed:     Impression of participation: ***

## 2018-01-18 NOTE — BH NOTES
Vargas Scheuermann is not participating in [facility]. Group time: 1915    Personal goal for participation: Repairs. Community Concerns    Goal orientation: personal    Group therapy participation: refuse    Therapeutic interventions reviewed and discussed: Staff encouraged Pt to report repairs and Community concerns    Impression of participation: Pt refuse, chose to rest in bed despite staff encouragement

## 2018-01-19 NOTE — DISCHARGE SUMMARY
Psychiatric Discharge Summary    Date: 18   Patient Name: Virginia Arzola  : 1960  MRN: [id number]  Admission Date: 2018  Discharge Date: 2018    HISTORY OF PRESENT ILLNESS:  This is a 81-year-old  male, history of alcohol use disorder and depression and anxiety, presents to the hospital after being released from [facility] after a 7-month stay in [facility] and with desires to get in an alcohol rehab program.  He is not currently drinking, has not been drinking since going into [facility] 7 months ago. However, he has increase in cravings and he is concerned that he will relapse on alcohol. He has increased anxiety, also has some frustrations about his medications as Seroquel does control his anxiety but also makes him feel \"zonked out. \"  He denies suicidal or homicidal ideation at this time. Denies psychosis. No active signs and symptoms of rich. He is anxious, has racing thoughts and poor sleep at night due to his anxiety. Denies drug use. Denies alcohol use. HOSPITAL COURSE:   Once on the unit, patient was irritable and anxious. He was frustrated about situational stressors- most of which revolved around interrelationship issues with wife and financial stressors.  He was not experiencing withdrawal, was not suicidal.    On day of discharge, patient was still anxious and frustrated about situational stressors, but otherwise clinically Stable, cooperative, not suicidal, not homicidal, not psychotic    MENTAL STATUS EXAM:  Orientation: oriented to person, place, time, and situation  Appearance: Dressed in hospital gown with fair grooming and hygiene  Behavior: Cooperative with good eye contact  Motor: No psychomotor agitation/retardation  Speech: Normal rate, tone and volume  Mood: \"anxious\"  Affect: anxious, irritable  Thought Process: linear, goal-directed  Thought Content: Denies SI and HI  Perception: Denies AH or VH  Concentration: fair  Memory: fair  Cognition: Alert and oriented  Insight: fair  Judgment: fair    ASSESSMENT at time of discharge:   Stable, cooperative, not suicidal, not homicidal, not psychotic    Diagnoses:  Bipolar 2 disorder  Alcohol use disorder, severe, in remission    Discharge Instructions:  1. Continue psychiatric medications of  seroquel 400 mg po nightly  seroquel 50 mg po tid as needed for anxiety during the day  2. Please make all follow up appointments with doctors and , as provided by inpatient behavioral health . 3. If you feel unsafe or begin experiencing suicidal thoughts again, please call 9-1-1 or return to the nearest emergency department. Disposition:  Home with outpatient follow-up      Dipti Fong MD

## 2018-02-14 ENCOUNTER — OFFICE VISIT (OUTPATIENT)
Dept: FAMILY MEDICINE CLINIC | Age: 58
End: 2018-02-14

## 2018-02-14 VITALS
SYSTOLIC BLOOD PRESSURE: 125 MMHG | BODY MASS INDEX: 27.16 KG/M2 | HEART RATE: 68 BPM | OXYGEN SATURATION: 99 % | HEIGHT: 71 IN | TEMPERATURE: 97.1 F | DIASTOLIC BLOOD PRESSURE: 76 MMHG | RESPIRATION RATE: 17 BRPM | WEIGHT: 194 LBS

## 2018-02-14 DIAGNOSIS — E11.42 DIABETIC POLYNEUROPATHY ASSOCIATED WITH TYPE 2 DIABETES MELLITUS (HCC): ICD-10-CM

## 2018-02-14 DIAGNOSIS — F32.A ANXIETY AND DEPRESSION: Primary | ICD-10-CM

## 2018-02-14 DIAGNOSIS — F41.9 ANXIETY AND DEPRESSION: Primary | ICD-10-CM

## 2018-02-14 DIAGNOSIS — E78.5 HYPERLIPIDEMIA, UNSPECIFIED HYPERLIPIDEMIA TYPE: ICD-10-CM

## 2018-02-14 DIAGNOSIS — E11.42 TYPE 2 DIABETES MELLITUS WITH DIABETIC POLYNEUROPATHY, WITHOUT LONG-TERM CURRENT USE OF INSULIN (HCC): ICD-10-CM

## 2018-02-14 DIAGNOSIS — D61.818 PANCYTOPENIA (HCC): ICD-10-CM

## 2018-02-14 DIAGNOSIS — I10 ESSENTIAL HYPERTENSION: ICD-10-CM

## 2018-02-14 RX ORDER — LANOLIN ALCOHOL/MO/W.PET/CERES
325 CREAM (GRAM) TOPICAL DAILY
Qty: 30 TAB | Refills: 0 | Status: SHIPPED | OUTPATIENT
Start: 2018-02-14 | End: 2018-03-14 | Stop reason: SDUPTHER

## 2018-02-14 RX ORDER — GABAPENTIN 300 MG/1
300 CAPSULE ORAL 2 TIMES DAILY
COMMUNITY
Start: 2018-02-04 | End: 2018-02-14 | Stop reason: SDUPTHER

## 2018-02-14 RX ORDER — LISINOPRIL 5 MG/1
5 TABLET ORAL DAILY
Qty: 30 TAB | Refills: 0 | Status: SHIPPED | OUTPATIENT
Start: 2018-02-14 | End: 2018-03-14 | Stop reason: SDUPTHER

## 2018-02-14 RX ORDER — QUETIAPINE FUMARATE 25 MG/1
25 TABLET, FILM COATED ORAL 3 TIMES DAILY
COMMUNITY
Start: 2018-02-04 | End: 2018-03-06

## 2018-02-14 RX ORDER — GABAPENTIN 300 MG/1
300 CAPSULE ORAL 3 TIMES DAILY
Qty: 120 CAP | Refills: 1 | Status: SHIPPED | OUTPATIENT
Start: 2018-02-14 | End: 2018-03-22 | Stop reason: SDUPTHER

## 2018-02-14 RX ORDER — QUETIAPINE FUMARATE 300 MG/1
300 TABLET, FILM COATED ORAL DAILY
Qty: 30 TAB | Refills: 0 | Status: SHIPPED | OUTPATIENT
Start: 2018-02-14 | End: 2018-03-14 | Stop reason: SDUPTHER

## 2018-02-14 RX ORDER — TRAZODONE HYDROCHLORIDE 100 MG/1
100 TABLET ORAL
Qty: 90 TAB | Refills: 0 | Status: SHIPPED | OUTPATIENT
Start: 2018-02-14 | End: 2018-08-03 | Stop reason: ALTCHOICE

## 2018-02-14 RX ORDER — METFORMIN HYDROCHLORIDE 1000 MG/1
1000 TABLET ORAL 2 TIMES DAILY WITH MEALS
Qty: 60 TAB | Refills: 0 | Status: SHIPPED | OUTPATIENT
Start: 2018-02-14 | End: 2018-03-14 | Stop reason: SDUPTHER

## 2018-02-14 RX ORDER — QUETIAPINE FUMARATE 300 MG/1
300 TABLET, FILM COATED ORAL DAILY
COMMUNITY
Start: 2018-02-04 | End: 2018-02-14 | Stop reason: SDUPTHER

## 2018-02-14 RX ORDER — HYDROXYZINE 50 MG/1
50 TABLET, FILM COATED ORAL
COMMUNITY
End: 2018-02-14

## 2018-02-14 RX ORDER — LANOLIN ALCOHOL/MO/W.PET/CERES
325 CREAM (GRAM) TOPICAL DAILY
COMMUNITY
Start: 2018-02-05 | End: 2018-02-14 | Stop reason: SDUPTHER

## 2018-02-14 RX ORDER — LISINOPRIL 5 MG/1
TABLET ORAL DAILY
COMMUNITY
End: 2018-02-14

## 2018-02-14 RX ORDER — TRAZODONE HYDROCHLORIDE 100 MG/1
100 TABLET ORAL
COMMUNITY
End: 2018-02-14

## 2018-02-14 RX ORDER — LISINOPRIL 5 MG/1
5 TABLET ORAL DAILY
COMMUNITY
Start: 2018-02-05 | End: 2018-02-14 | Stop reason: SDUPTHER

## 2018-02-14 RX ORDER — METFORMIN HYDROCHLORIDE 1000 MG/1
1000 TABLET ORAL 2 TIMES DAILY WITH MEALS
COMMUNITY
Start: 2018-02-04 | End: 2018-02-14 | Stop reason: SDUPTHER

## 2018-02-14 RX ORDER — TRAZODONE HYDROCHLORIDE 100 MG/1
100 TABLET ORAL
COMMUNITY
Start: 2018-02-04 | End: 2018-02-14 | Stop reason: SDUPTHER

## 2018-02-14 NOTE — PROGRESS NOTES
1. Have you been to the ER, urgent care clinic since your last visit? Hospitalized since your last visit? Yes When: Patient was seen at [facility] 2-4-2018    2. Have you seen or consulted any other health care providers outside of the [address] [place] [address] since your last visit? Include any pap smears or colon screening.  No       Patient here today for medication check and follow-up from [facility]

## 2018-02-14 NOTE — MR AVS SNAPSHOT
Osvaldo Burgess [place] [address] [facility] [address] [facility] 83 [address] 
[phone] Patient: Sussy Trivedi MRN: [id number] :1960 Visit Information Date & Time Provider Department Dept. Phone Encounter #  
 2018 10:30 AM Helene Azevedo, [address] [place] [address] [phone] [id number] Follow-up Instructions Return in about 1 month (around 3/14/2018). Upcoming Health Maintenance Date Due Hepatitis C Screening 1960 DTaP/Tdap/Td series (1 - Tdap) 1982 FOBT Q 1 YEAR AGE 50-75 2011 Allergies as of 2018  Review Complete On: 2018 By: Albert Fay Severity Noted Reaction Type Reactions Aleve [Naproxen Sodium]  2013    Itching Current Immunizations  Reviewed on 2018 Name Date Influenza Vaccine 2015 12:00 AM, 10/15/2014 12:00 AM, 10/9/2013 12:00 AM  
 Influenza Vaccine (Quad) PF 2018 11:13 AM  
 Pneumococcal Polysaccharide (PPSV-23) 2013 12:00 AM  
  
 Not reviewed this visit You Were Diagnosed With   
  
 Codes Comments Anxiety and depression    -  Primary ICD-10-CM: F41.8 ICD-9-CM: 300.00, 311 Pancytopenia ([facility] 75.)     ICD-10-CM: N89.461 ICD-9-CM: 284.19 Essential hypertension     ICD-10-CM: I10 
ICD-9-CM: 401.9 Type 2 diabetes mellitus with diabetic polyneuropathy, without long-term current use of insulin (HCC)     ICD-10-CM: E11.42 
ICD-9-CM: 250.60, 357.2 Diabetic polyneuropathy associated with type 2 diabetes mellitus ([facility]     ICD-10-CM: E11.42 
ICD-9-CM: 250.60, 357.2 Vitals BP Pulse Temp Resp Height(growth percentile) Weight(growth percentile) 125/76 (BP 1 Location: Right arm, BP Patient Position: Sitting) 68 97.1 °F (36.2 °C) (Oral) 17 5' 11\" (1.803 m) 194 lb (88 kg) SpO2 BMI Smoking Status 99% 27.06 kg/m2 Never Smoker BMI and BSA Data  Body Mass Index Body Surface Area  
 27.06 kg/m 2 2.1 m 2  
  
 Preferred Pharmacy Pharmacy Name Phone [phone] [facility] [address] [phone] Your Updated Medication List  
  
   
This list is accurate as of: 2/14/18 11:38 AM.  Always use your most recent med list.  
  
  
  
  
 ferrous sulfate 325 mg (65 mg iron) tablet Take 1 Tab by mouth daily for 30 days. gabapentin 300 mg capsule Commonly known as:  NEURONTIN Take 1 Cap by mouth three (3) times daily. lisinopril 5 mg tablet Commonly known as:  Marlane Shama Take 1 Tab by mouth daily for 30 days. metFORMIN 1,000 mg tablet Commonly known as:  GLUCOPHAGE Take 1 Tab by mouth two (2) times daily (with meals) for 30 days. * QUEtiapine 25 mg tablet Commonly known as:  SEROquel Take 25 mg by mouth three (3) times daily. * QUEtiapine 300 mg tablet Commonly known as:  SEROquel Take 1 Tab by mouth daily for 30 days. traZODone 100 mg tablet Commonly known as:  Bryan Bristle Take 1 Tab by mouth nightly. vortioxetine 10 mg tablet Commonly known as:  TRINTELLIX Take 1 Tab by mouth daily. * Notice: This list has 2 medication(s) that are the same as other medications prescribed for you. Read the directions carefully, and ask your doctor or other care provider to review them with you. Prescriptions Sent to Pharmacy Refills  
 ferrous sulfate 325 mg (65 mg iron) tablet 0 Sig: Take 1 Tab by mouth daily for 30 days. Class: Normal  
 [facility]: [phone] Route: Oral  
 gabapentin (NEURONTIN) 300 mg capsule 1 Sig: Take 1 Cap by mouth three (3) times daily. Class: Normal  
 [facility]: [phone] Route: Oral  
 lisinopril (PRINIVIL, ZESTRIL) 5 mg tablet 0 Sig: Take 1 Tab by mouth daily for 30 days. Class: Normal  
 Pharmacy: [facility] [place]  #: [phone] Route: Oral  
 metFORMIN (GLUCOPHAGE) 1,000 mg tablet 0 Sig: Take 1 Tab by mouth two (2) times daily (with meals) for 30 days. Class: Normal  
 Pharmacy: [facility] [place]  #: [phone] Route: Oral  
 QUEtiapine (SEROQUEL) 300 mg tablet 0 Sig: Take 1 Tab by mouth daily for 30 days. Class: Normal  
 Pharmacy: [facility] [place]  #: [phone] Route: Oral  
 traZODone (DESYREL) 100 mg tablet 0 Sig: Take 1 Tab by mouth nightly. Class: Normal  
 [facility] [phone] Route: Oral  
 vortioxetine (TRINTELLIX) 10 mg tablet 0 Sig: Take 1 Tab by mouth daily. Class: Normal  
 [facility]acy: [facility] [place]  #: [phone] Route: Oral  
  
Follow-up Instructions Return in about 1 month (around 3/14/2018). To-Do List   
 02/14/2018 Lab:  CBC WITH AUTOMATED DIFF   
  
 02/14/2018 Lab:  METABOLIC PANEL, COMPREHENSIVE Introducing [facility]! [facility] introduces [employer] patient portal. Now you can access parts of your medical record, email your doctor's office, and request medication refills online. 1. In your internet browser, go to [email or web address] 2. Click on the First Time User? Click Here link in the Sign In box. You will see the New Member Sign Up page. 3. Enter your [employer] Access Code exactly as it appears below. You will not need to use this code after youve completed the sign-up process. If you do not sign up before the expiration date, you must request a new code. · [employer] Access Code: [email or web address] Expires: 4/15/2018 10:45 AM 
 
 4. Enter the last four digits of your Social Security Number (xxxx) and Date of Birth (mm/dd/yyyy) as indicated and click Submit. You will be taken to the next sign-up page. 5. Create a [employer] ID. This will be your [employer] login ID and cannot be changed, so think of one that is secure and easy to remember. 6. Create a [employer] password. You can change your password at any time. 7. Enter your Password Reset Question and Answer. This can be used at a later time if you forget your password. 8. Enter your e-mail address. You will receive e-mail notification when new information is available in [address] 9. Click Sign Up. You can now view and download portions of your medical record. 10. Click the Download Summary menu link to download a portable copy of your medical information. If you have questions, please visit the Frequently Asked Questions section of the [employer] website. Remember, [employer] is NOT to be used for urgent needs. For medical emergencies, dial 911. Now available from your iPhone and Android! Please provide this summary of care documentation to your next provider. Your primary care clinician is listed as  [facility] Mediate. If you have any questions after today's visit, please call [phone].
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
## 2018-02-15 LAB
ALBUMIN SERPL-MCNC: 3.8 G/DL (ref 3.5–5.5)
ALBUMIN/GLOB SERPL: 1.3 {RATIO} (ref 1.2–2.2)
ALP SERPL-CCNC: 97 IU/L (ref 39–117)
ALT SERPL-CCNC: 23 IU/L (ref 0–44)
AST SERPL-CCNC: 28 IU/L (ref 0–40)
BASOPHILS # BLD AUTO: 0 X10E3/UL (ref 0–0.2)
BASOPHILS NFR BLD AUTO: 1 %
BILIRUB SERPL-MCNC: 0.3 MG/DL (ref 0–1.2)
BUN SERPL-MCNC: 11 MG/DL (ref 6–24)
BUN/CREAT SERPL: 18 (ref 9–20)
CALCIUM SERPL-MCNC: 9 MG/DL (ref 8.7–10.2)
CHLORIDE SERPL-SCNC: 102 MMOL/L (ref 96–106)
CO2 SERPL-SCNC: 25 MMOL/L (ref 18–29)
CREAT SERPL-MCNC: 0.61 MG/DL (ref 0.76–1.27)
EOSINOPHIL # BLD AUTO: 0 X10E3/UL (ref 0–0.4)
EOSINOPHIL NFR BLD AUTO: 1 %
ERYTHROCYTE [DISTWIDTH] IN BLOOD BY AUTOMATED COUNT: 16.5 % (ref 12.3–15.4)
GFR SERPLBLD CREATININE-BSD FMLA CKD-EPI: 112 ML/MIN/1.73
GFR SERPLBLD CREATININE-BSD FMLA CKD-EPI: 129 ML/MIN/1.73
GLOBULIN SER CALC-MCNC: 3 G/DL (ref 1.5–4.5)
GLUCOSE SERPL-MCNC: 89 MG/DL (ref 65–99)
HCT VFR BLD AUTO: 36.3 % (ref 37.5–51)
HGB BLD-MCNC: 11.7 G/DL (ref 13–17.7)
IMM GRANULOCYTES # BLD: 0 X10E3/UL (ref 0–0.1)
IMM GRANULOCYTES NFR BLD: 0 %
LYMPHOCYTES # BLD AUTO: 0.6 X10E3/UL (ref 0.7–3.1)
LYMPHOCYTES NFR BLD AUTO: 20 %
MCH RBC QN AUTO: 28.5 PG (ref 26.6–33)
MCHC RBC AUTO-ENTMCNC: 32.2 G/DL (ref 31.5–35.7)
MCV RBC AUTO: 88 FL (ref 79–97)
MONOCYTES # BLD AUTO: 0.3 X10E3/UL (ref 0.1–0.9)
MONOCYTES NFR BLD AUTO: 8 %
MORPHOLOGY BLD-IMP: ABNORMAL
NEUTROPHILS # BLD AUTO: 2.2 X10E3/UL (ref 1.4–7)
NEUTROPHILS NFR BLD AUTO: 70 %
PLATELET # BLD AUTO: 64 X10E3/UL (ref 150–379)
POTASSIUM SERPL-SCNC: 4.5 MMOL/L (ref 3.5–5.2)
PROT SERPL-MCNC: 6.8 G/DL (ref 6–8.5)
RBC # BLD AUTO: 4.11 X10E6/UL (ref 4.14–5.8)
SODIUM SERPL-SCNC: 141 MMOL/L (ref 134–144)
WBC # BLD AUTO: 3.2 X10E3/UL (ref 3.4–10.8)

## 2018-02-16 ENCOUNTER — TELEPHONE (OUTPATIENT)
Dept: FAMILY MEDICINE CLINIC | Age: 58
End: 2018-02-16

## 2018-02-16 NOTE — PROGRESS NOTES
Pancytopenia has improved from his hospitalization with the hospital prescribed iron supplementation. Will continue his current iron therapy with plan to repeat test on follow up visit. Patient will likely need evaluation by hematology, if he does not improve as expected.

## 2018-02-16 NOTE — TELEPHONE ENCOUNTER
Patient notified of lab results. Patient call forwarded to  for scheduling follow up appointment in 1 month.

## 2018-03-14 ENCOUNTER — OFFICE VISIT (OUTPATIENT)
Dept: FAMILY MEDICINE CLINIC | Age: 58
End: 2018-03-14

## 2018-03-14 VITALS
SYSTOLIC BLOOD PRESSURE: 133 MMHG | BODY MASS INDEX: 28.28 KG/M2 | WEIGHT: 202 LBS | RESPIRATION RATE: 17 BRPM | HEIGHT: 71 IN | HEART RATE: 76 BPM | DIASTOLIC BLOOD PRESSURE: 76 MMHG | TEMPERATURE: 97.5 F

## 2018-03-14 DIAGNOSIS — F41.8 DEPRESSION WITH ANXIETY: ICD-10-CM

## 2018-03-14 DIAGNOSIS — D61.818 PANCYTOPENIA (HCC): Primary | ICD-10-CM

## 2018-03-14 DIAGNOSIS — E11.9 TYPE 2 DIABETES MELLITUS WITHOUT COMPLICATION, WITHOUT LONG-TERM CURRENT USE OF INSULIN (HCC): ICD-10-CM

## 2018-03-14 DIAGNOSIS — F41.9 ANXIETY AND DEPRESSION: ICD-10-CM

## 2018-03-14 DIAGNOSIS — D61.818 PANCYTOPENIA (HCC): ICD-10-CM

## 2018-03-14 DIAGNOSIS — F32.A ANXIETY AND DEPRESSION: ICD-10-CM

## 2018-03-14 DIAGNOSIS — E11.42 TYPE 2 DIABETES MELLITUS WITH DIABETIC POLYNEUROPATHY, WITHOUT LONG-TERM CURRENT USE OF INSULIN (HCC): ICD-10-CM

## 2018-03-14 DIAGNOSIS — I10 ESSENTIAL HYPERTENSION: ICD-10-CM

## 2018-03-14 RX ORDER — LISINOPRIL 5 MG/1
5 TABLET ORAL DAILY
Qty: 90 TAB | Refills: 1 | Status: SHIPPED | OUTPATIENT
Start: 2018-03-14 | End: 2018-04-17 | Stop reason: ALTCHOICE

## 2018-03-14 RX ORDER — QUETIAPINE FUMARATE 25 MG/1
25 TABLET, FILM COATED ORAL 3 TIMES DAILY
COMMUNITY
Start: 2018-02-04 | End: 2018-03-14 | Stop reason: DRUGHIGH

## 2018-03-14 RX ORDER — QUETIAPINE FUMARATE 300 MG/1
300 TABLET, FILM COATED ORAL DAILY
Qty: 90 TAB | Refills: 1 | Status: SHIPPED | OUTPATIENT
Start: 2018-03-14 | End: 2018-03-22 | Stop reason: SDUPTHER

## 2018-03-14 RX ORDER — GLIMEPIRIDE 1 MG/1
1 TABLET ORAL
Qty: 30 TAB | Refills: 1 | Status: SHIPPED | OUTPATIENT
Start: 2018-03-14 | End: 2018-04-17

## 2018-03-14 RX ORDER — HYDROXYZINE PAMOATE 50 MG/1
25 CAPSULE ORAL
COMMUNITY
End: 2018-03-14 | Stop reason: ALTCHOICE

## 2018-03-14 NOTE — MR AVS SNAPSHOT
Osvaldo Burgess Lima [address] [facility] [address] [facility] 83 [address] 
[phone] Patient: Victor M Laboy MRN: [id number] :1960 Visit Information Date & Time Provider Department Dept. Phone Encounter #  
 3/14/2018  9:30 AM Edmund Munoz, [address] [place] [address] [phone] [id number] Follow-up Instructions Return in about 1 month (around 2018). Upcoming Health Maintenance Date Due Hepatitis C Screening 1960 DTaP/Tdap/Td series (1 - Tdap) 1982 FOBT Q 1 YEAR AGE 50-75 2011 Pneumococcal 19-64 Highest Risk (2 of 3 - PCV13) 2014 Allergies as of 3/14/2018  Review Complete On: 3/14/2018 By: Hawa López LPN Severity Noted Reaction Type Reactions Aleve [Naproxen Sodium]  2011    Itching, Hives Naproxen  2014    Hives Current Immunizations  Reviewed on 3/14/2018 Name Date Influenza Vaccine 2015 12:00 AM, 10/15/2014 12:00 AM, 10/9/2013 12:00 AM  
 Influenza Vaccine (Quad) PF 2018 11:13 AM  
 Pneumococcal Polysaccharide (PPSV-23) 2013 12:00 AM  
  
 Reviewed by Hawa López LPN on  at  8:53 AM  
 Reviewed by Hawa López LPN on  at  8:56 AM  
You Were Diagnosed With   
  
 Codes Comments Pancytopenia ([facility]    -  Primary ICD-10-CM: D56.512 ICD-9-CM: 284.19 Essential hypertension     ICD-10-CM: I10 
ICD-9-CM: 401.9 Depression with anxiety     ICD-10-CM: F41.8 ICD-9-CM: 300.4 Type 2 diabetes mellitus without complication, without long-term current use of insulin (HCC)     ICD-10-CM: E11.9 ICD-9-CM: 250.00 Anxiety and depression     ICD-10-CM: F41.8 ICD-9-CM: 300.00, 311 Vitals BP Pulse Temp Resp Height(growth percentile) Weight(growth percentile) 133/76 76 97.5 °F (36.4 °C) (Oral) 17 5' 11\" (1.803 m) 202 lb (91.6 kg) BMI Smoking Status 28.17 kg/m2 Never Smoker Vitals History BMI and BSA Data Body Mass Index Body Surface Area  
 28.17 kg/m 2 2.14 m 2 [phone] Your Updated Medication List  
  
   
This list is accurate as of 3/14/18  9:41 AM.  Always use your most recent med list.  
  
  
  
  
 ferrous sulfate 325 mg (65 mg iron) tablet Take 1 Tab by mouth daily for 30 days. gabapentin 300 mg capsule Commonly known as:  NEURONTIN Take 1 Cap by mouth three (3) times daily. glimepiride 1 mg tablet Commonly known as:  AMARYL Take 1 Tab by mouth Daily (before breakfast). lisinopril 5 mg tablet Commonly known as:  Marlane Shama Take 1 Tab by mouth daily. metFORMIN 1,000 mg tablet Commonly known as:  GLUCOPHAGE Take 1 Tab by mouth two (2) times daily (with meals) for 30 days. QUEtiapine 300 mg tablet Commonly known as:  SEROquel Take 1 Tab by mouth daily. traZODone 100 mg tablet Commonly known as:  Bryan Bristle Take 1 Tab by mouth nightly. Prescriptions Sent to Pharmacy Refills  
 glimepiride (AMARYL) 1 mg tablet 1 Sig: Take 1 Tab by mouth Daily (before breakfast). Class: Normal  
 [facility]acy: [facility] [place] Ph #: [phone] Route: Oral  
 QUEtiapine (SEROQUEL) 300 mg tablet 1 Sig: Take 1 Tab by mouth daily. Class: Normal  
 [facility] [phone] Route: Oral  
 lisinopril (PRINIVIL, ZESTRIL) 5 mg tablet 1 Sig: Take 1 Tab by mouth daily. Class: Normal  
 [facility]: [phone] Route: Oral  
  
We Performed the Following REFERRAL TO HEMATOLOGY [GCJ95 Custom] Follow-up Instructions Return in about 1 month (around 4/14/2018). To-Do List   
 03/14/2018 Lab:  CBC WITH AUTOMATED DIFF   
  
 03/14/2018 Lab:  FERRITIN   
  
 03/14/2018 Lab:  HIV 1/2 AG/AB, 4TH GENERATION,W RFLX CONFIRM   
  
 03/14/2018 Lab:  IRON PROFILE   
  
 03/14/2018 Pathology:  PATHOLOGIST REVIEW SMEARS   
  
 03/14/2018 Lab:  PROTHROMBIN TIME + INR   
  
 03/14/2018 Lab:  PTT   
  
 03/14/2018 Lab:  RETICULOCYTE COUNT   
  
 03/14/2018 Lab:  VITAMIN D, 25 HYDROXY Referral Information Referral ID Referred By Referred To  
  
 [id number] Lennie KYLE Not Available Visits Status Start Date End Date 1 New Request 3/14/18 3/14/19 If your referral has a status of pending review or denied, additional information will be sent to support the outcome of this decision. Introducing [facility] & HEALTH SERVICES! Carmen Metcalf introduces [employer] patient portal. Now you can access parts of your medical record, email your doctor's office, and request medication refills online. 1. In your internet browser, go to [email or web address] 2. Click on the First Time User? Click Here link in the Sign In box. You will see the New Member Sign Up page. 3. Enter your [employer] Access Code exactly as it appears below. You will not need to use this code after youve completed the sign-up process. If you do not sign up before the expiration date, you must request a new code. · [employer] Access Code: [email or web address] Expires: 4/15/2018 11:45 AM 
 
4. Enter the last four digits of your Social Security Number (xxxx) and Date of Birth (mm/dd/yyyy) as indicated and click Submit. You will be taken to the next sign-up page. 5. Create a [employer] ID. This will be your [employer] login ID and cannot be changed, so think of one that is secure and easy to remember. 6. Create a [employer] password. You can change your password at any time. 7. Enter your Password Reset Question and Answer. This can be used at a later time if you forget your password. 8. Enter your e-mail address. You will receive e-mail notification when new information is available in [address] 9. Click Sign Up. You can now view and download portions of your medical record. 10. Click the Download Summary menu link to download a portable copy of your medical information. If you have questions, please visit the Frequently Asked Questions section of the [employer] website. Remember, [employer] is NOT to be used for urgent needs. For medical emergencies, dial 911. Now available from your iPhone and Android! Please provide this summary of care documentation to your next provider. Your primary care clinician is listed as Shelly aRndall. If you have any questions after today's visit, please call [phone].

## 2018-03-14 NOTE — PROGRESS NOTES
1. Have you been to the ER, urgent care clinic since your last visit? Hospitalized since your last visit? No    2. Have you seen or consulted any other health care providers outside of the [address] [place] [address] since your last visit? Include any pap smears or colon screening. No       Patient presents for follow up. Patient reports he takes Seroquel 25 mg three times daily, he also reports taking 300 mg at night. Patient reports his blood sugars have been running high, and he would like to see about getting a prescription Humalog sliding scale before meals  and latus 30 units in the morning and 30 units at night. Patient states he is having left leg pain that radiates to left foot and reports left foot numbness.

## 2018-03-14 NOTE — LETTER
3/14/2018 9:47 AM 
 
Mr. Kyle Javier 
[address] [id number] To Whom it may concern, 
 
Mr. Soledad Durbin has difficulty ambulating due to chronic pain. Please excuse him as needed from having to walk to the bus. If you have any questions please have the patient contact our office at [phone]. Sincerely, Esa Hernandez MD

## 2018-03-14 NOTE — PROGRESS NOTES
Jayashree Bundy    CC: F/U visit for Pancytopenia    HPI:     DMII:  - Increased pain and numbness in left foot so cannot exercise as much  - Increased appetite, states he feels it's a side effect of Seroquel  - Reprots that the highest his FBS has been was in the 150s  - Previously on Glimepiride, then Humalog and Lantus, most recently was controlled with diet, exercise, and Metformin but states he has been exercising less due to foot pain and his weight has gone up so he feels he may need insulin again      Depression and Anxiety:  - Saw [facility] who stated he did not require a psychiatrist as long as his prescriptions for Vistaril, Trazodone, and Seroquel   - States vistaril was not helping, has not been taking it recenytly   - Mood issues have been well controlled on his current regimen of Trazodone and Seroquel      Pancytopenia:  - Got requested blood work  - Has been taking iron supplement as prescribed by hospital with no issues  - States no work-up has been done to evaluate this. - Is fatigued all the time  - Was tested for HIV within the past 8 months while in group home, negative at that time  - Reduced EtOH x2 years ago, has had no EtOH x10 months      Hypertension:  - Has not been taking lisinopril x7 days to evaluate how is HTN was without medication  - It has been increasing so he is ready to once again follow his regimen as prescribed    ROS: Positive items marked in RED  CON: fever, chills, fatigue  Cardiovascular: palpitations, CP  Resp: cough, hemoptysis, SOB  GI: nausea (Last week), vomiting (Last week), diarrhea (Last week), melena, hematemesis, hematochezia  : dysuria, hematuria  Neuro: numbness (Chronic. 2/2 sciatica)      Results for Maeve Aguilera (MRN [id number]):   Ref.  Range 2/14/2018 11:41   WBC Latest Ref Range: 3.4 - 10.8 x10E3/uL 3.2 (L)   RBC Latest Ref Range: 4.14 - 5.80 x10E6/uL 4.11 (L)   HGB Latest Ref Range: 13.0 - 17.7 g/dL 11.7 (L)   HCT Latest Ref Range: 37.5 - 51.0 % 36.3 (L)   MCV Latest Ref Range: 79 - 97 fL 88   MCH Latest Ref Range: 26.6 - 33.0 pg 28.5   MCHC Latest Ref Range: 31.5 - 35.7 g/dL 32.2   RDW Latest Ref Range: 12.3 - 15.4 % 16.5 (H)   PLATELET Latest Ref Range: 150 - 379 x10E3/uL 64 (LL)   NEUTROPHILS Latest Ref Range: Not Estab. % 70   Lymphocytes Latest Ref Range: Not Estab. % 20   MONOCYTES Latest Ref Range: Not Estab. % 8   EOSINOPHILS Latest Ref Range: Not Estab. % 1   BASOPHILS Latest Ref Range: Not Estab. % 1   IMMATURE GRANULOCYTES Latest Ref Range: Not Estab. % 0   ABS. NEUTROPHILS Latest Ref Range: 1.4 - 7.0 x10E3/uL 2.2   ABS. IMM. GRANS. Latest Ref Range: 0.0 - 0.1 x10E3/uL 0.0   Abs Lymphocytes Latest Ref Range: 0.7 - 3.1 x10E3/uL 0.6 (L)   ABS. MONOCYTES Latest Ref Range: 0.1 - 0.9 x10E3/uL 0.3   ABS. EOSINOPHILS Latest Ref Range: 0.0 - 0.4 x10E3/uL 0.0   ABS. BASOPHILS Latest Ref Range: 0.0 - 0.2 x10E3/uL 0.0   Sodium Latest Ref Range: 134 - 144 mmol/L 141   Potassium Latest Ref Range: 3.5 - 5.2 mmol/L 4.5   Chloride Latest Ref Range: 96 - 106 mmol/L 102   CO2 Latest Ref Range: 18 - 29 mmol/L 25   Glucose Latest Ref Range: 65 - 99 mg/dL 89   BUN Latest Ref Range: 6 - 24 mg/dL 11   Creatinine Latest Ref Range: 0.76 - 1.27 mg/dL 0.61 (L)   BUN/Creatinine ratio Latest Ref Range: 9 - 20  18   Calcium Latest Ref Range: 8.7 - 10.2 mg/dL 9.0   GFR est non-AA Latest Ref Range: >59 mL/min/1.73 112   GFR est AA Latest Ref Range: >59 mL/min/1.73 129   Bilirubin, total Latest Ref Range: 0.0 - 1.2 mg/dL 0.3   Protein, total Latest Ref Range: 6.0 - 8.5 g/dL 6.8   Albumin Latest Ref Range: 3.5 - 5.5 g/dL 3.8   A-G Ratio Latest Ref Range: 1.2 - 2.2  1.3   ALT (SGPT) Latest Ref Range: 0 - 44 IU/L 23   AST Latest Ref Range: 0 - 40 IU/L 28   Alk.  phosphatase Latest Ref Range: 39 - 117 IU/L 97       Past Medical History:   Diagnosis Date    CAD (coronary artery disease)     \"cardiac stent placed 1 year ago\"    Depression     Diabetes ([facility] [address]     Hypertension     Substance abuse     Suicidal thoughts        Past Surgical History:   Procedure Laterality Date    HX BACK SURGERY  2014    HX CORONARY STENT PLACEMENT  2012    No stent placed       Family History   Problem Relation Age of Onset    Suicide Father     Diabetes Brother     Psychiatric Disorder Other        Social History     Social History    Marital status:      Spouse name: N/A    Number of children: N/A    Years of education: N/A     Social History Main Topics    Smoking status: Never Smoker    Smokeless tobacco: Never Used    Alcohol use No    Drug use: No    Sexual activity: Not Currently     Other Topics Concern    None     Social History Narrative     X 2. Lives with wife and 21year old son. 6year old son was taken away by . Unemployed        Allergies   Allergen Reactions    Aleve [Naproxen Sodium] Itching and Hives    Naproxen Hives         Current Outpatient Prescriptions:     ferrous sulfate 325 mg (65 mg iron) tablet, Take 1 Tab by mouth daily for 30 days. , Disp: 30 Tab, Rfl: 0    gabapentin (NEURONTIN) 300 mg capsule, Take 1 Cap by mouth three (3) times daily. , Disp: 120 Cap, Rfl: 1    lisinopril (PRINIVIL, ZESTRIL) 5 mg tablet, Take 1 Tab by mouth daily for 30 days. , Disp: 30 Tab, Rfl: 0    metFORMIN (GLUCOPHAGE) 1,000 mg tablet, Take 1 Tab by mouth two (2) times daily (with meals) for 30 days. , Disp: 60 Tab, Rfl: 0    QUEtiapine (SEROQUEL) 300 mg tablet, Take 1 Tab by mouth daily for 30 days. , Disp: 30 Tab, Rfl: 0    traZODone (DESYREL) 100 mg tablet, Take 1 Tab by mouth nightly., Disp: 90 Tab, Rfl: 0    vortioxetine (TRINTELLIX) 10 mg tablet, Take 1 Tab by mouth daily. , Disp: 90 Tab, Rfl: 0    Physical Exam:      /76  Pulse 76  Temp 97.5 °F (36.4 °C) (Oral)   Resp 17  Ht 5' 11\" (1.803 m)  Wt 202 lb (91.6 kg)  BMI 28.17 kg/m2    General:  WD, WN, NAD  Eyes: sclera clear bilaterally, no discharge noted, eyelids normal in appearance, slight conjunctival pallor  HENT: NCAT  Lungs: CTAB, Normal respiratory effort and rate  CV: RRR, no MRGs  ABD: soft, non-tender, non-distended, normal bowel sounds, easily reducible nontender umbilical hernia  Ext: no peripheral edema or digital cyanosis noted  Skin: normal temperature, turgor, color, and texture  Psych: alert and oriented to person, place and time, normal affect  Neuro: Speech normal, Moving all extremities, gait normal      Assessment/Plan     Pancytopenia, Slightly Improved:  - Will check CBC to monitor status  - Will start work up with peripheral smear, ferritin, iron panel, retic count, PT/PTT, and HIV 1/2 AG/AB  - Will refer to hematology for further evaluation  - Will continue current iron supplement  - Follow up in one month      HTN, Inadequately Controlled:  - 2/2 not taking medication as prescribed  - Patient will resume taking his prior Lisinopril regimen  - Follow up in one month      DMII, Exacerbated:  - Home FBS not at goal  - Likely 2/2 not following lifestyle recommendations  - Will add on glimepiride to current metformin regimen  - Follow up in one month      Depression and Anxiety, Well Controlled:  - Will D/C vortioxetine regimen  - Will continue current trazodone and quetiapine regimen  - Follow up in one month        Deirdre Ferguson MD  3/14/2018, 9:00 AM

## 2018-03-15 RX ORDER — METFORMIN HYDROCHLORIDE 1000 MG/1
TABLET ORAL
Qty: 60 TAB | Refills: 0 | Status: SHIPPED | OUTPATIENT
Start: 2018-03-15 | End: 2018-03-22 | Stop reason: SDUPTHER

## 2018-03-15 RX ORDER — LANOLIN ALCOHOL/MO/W.PET/CERES
CREAM (GRAM) TOPICAL
Qty: 30 TAB | Refills: 0 | Status: SHIPPED | OUTPATIENT
Start: 2018-03-15 | End: 2018-03-22 | Stop reason: SDUPTHER

## 2018-03-19 LAB
25(OH)D3+25(OH)D2 SERPL-MCNC: 21.6 NG/ML (ref 30–100)
APTT PPP: 26 SEC (ref 24–33)
BASOPHILS # BLD AUTO: 0 X10E3/UL (ref 0–0.2)
BASOPHILS NFR BLD AUTO: 1 %
EOSINOPHIL # BLD AUTO: 0.1 X10E3/UL (ref 0–0.4)
EOSINOPHIL NFR BLD AUTO: 3 %
ERYTHROCYTE [DISTWIDTH] IN BLOOD BY AUTOMATED COUNT: 17.3 % (ref 12.3–15.4)
FERRITIN SERPL-MCNC: 28 NG/ML (ref 30–400)
HCT VFR BLD AUTO: 35.9 % (ref 37.5–51)
HGB BLD-MCNC: 12.1 G/DL (ref 13–17.7)
HIV 1+2 AB+HIV1 P24 AG SERPL QL IA: NON REACTIVE
IMM GRANULOCYTES # BLD: 0 X10E3/UL (ref 0–0.1)
IMM GRANULOCYTES NFR BLD: 1 %
INR PPP: 1.1 (ref 0.8–1.2)
IRON SATN MFR SERPL: 19 % (ref 15–55)
IRON SERPL-MCNC: 56 UG/DL (ref 38–169)
LYMPHOCYTES # BLD AUTO: 0.6 X10E3/UL (ref 0.7–3.1)
LYMPHOCYTES NFR BLD AUTO: 28 %
MCH RBC QN AUTO: 29 PG (ref 26.6–33)
MCHC RBC AUTO-ENTMCNC: 33.7 G/DL (ref 31.5–35.7)
MCV RBC AUTO: 86 FL (ref 79–97)
MONOCYTES # BLD AUTO: 0.1 X10E3/UL (ref 0.1–0.9)
MONOCYTES NFR BLD AUTO: 6 %
MORPHOLOGY BLD-IMP: ABNORMAL
NEUTROPHILS # BLD AUTO: 1.2 X10E3/UL (ref 1.4–7)
NEUTROPHILS NFR BLD AUTO: 61 %
PATH INTERP BLD-IMP: ABNORMAL
PATH REV BLD -IMP: ABNORMAL
PATHOLOGIST NAME: ABNORMAL
PLATELET # BLD AUTO: 65 X10E3/UL (ref 150–379)
PROTHROMBIN TIME: 12 SEC (ref 9.1–12)
RBC # BLD AUTO: 4.17 X10E6/UL (ref 4.14–5.8)
RETICS/RBC NFR AUTO: 1.4 % (ref 0.6–2.6)
TIBC SERPL-MCNC: 295 UG/DL (ref 250–450)
UIBC SERPL-MCNC: 239 UG/DL (ref 111–343)
WBC # BLD AUTO: 2 X10E3/UL (ref 3.4–10.8)

## 2018-03-22 DIAGNOSIS — D61.818 PANCYTOPENIA (HCC): ICD-10-CM

## 2018-03-22 DIAGNOSIS — F32.A ANXIETY AND DEPRESSION: ICD-10-CM

## 2018-03-22 DIAGNOSIS — E11.42 DIABETIC POLYNEUROPATHY ASSOCIATED WITH TYPE 2 DIABETES MELLITUS (HCC): ICD-10-CM

## 2018-03-22 DIAGNOSIS — F41.9 ANXIETY AND DEPRESSION: ICD-10-CM

## 2018-03-22 DIAGNOSIS — E11.42 TYPE 2 DIABETES MELLITUS WITH DIABETIC POLYNEUROPATHY, WITHOUT LONG-TERM CURRENT USE OF INSULIN (HCC): ICD-10-CM

## 2018-03-22 RX ORDER — QUETIAPINE FUMARATE 300 MG/1
300 TABLET, FILM COATED ORAL DAILY
Qty: 90 TAB | Refills: 1 | Status: SHIPPED | OUTPATIENT
Start: 2018-03-22 | End: 2018-07-09 | Stop reason: DRUGHIGH

## 2018-03-22 RX ORDER — GABAPENTIN 300 MG/1
300 CAPSULE ORAL 3 TIMES DAILY
Qty: 270 CAP | Refills: 0 | Status: SHIPPED | OUTPATIENT
Start: 2018-03-22 | End: 2018-07-09 | Stop reason: DRUGHIGH

## 2018-03-22 RX ORDER — METFORMIN HYDROCHLORIDE 1000 MG/1
TABLET ORAL
Qty: 180 TAB | Refills: 0 | Status: SHIPPED | OUTPATIENT
Start: 2018-03-22 | End: 2018-08-03 | Stop reason: SDUPTHER

## 2018-03-22 RX ORDER — LANOLIN ALCOHOL/MO/W.PET/CERES
CREAM (GRAM) TOPICAL
Qty: 90 TAB | Refills: 0 | Status: SHIPPED | OUTPATIENT
Start: 2018-03-22 | End: 2018-04-16 | Stop reason: CLARIF

## 2018-03-22 NOTE — TELEPHONE ENCOUNTER
Last appt was 3-14-18  Next appt is 4-17-18        Fax received from pharmacy states it is cheaper for patient to receive 90 day supply per insurance.

## 2018-04-16 DIAGNOSIS — D61.818 PANCYTOPENIA (HCC): ICD-10-CM

## 2018-04-16 RX ORDER — LANOLIN ALCOHOL/MO/W.PET/CERES
CREAM (GRAM) TOPICAL
Qty: 30 TAB | Refills: 0 | Status: SHIPPED | OUTPATIENT
Start: 2018-04-16 | End: 2018-07-09 | Stop reason: SDUPTHER

## 2018-04-17 ENCOUNTER — OFFICE VISIT (OUTPATIENT)
Dept: FAMILY MEDICINE CLINIC | Age: 58
End: 2018-04-17

## 2018-04-17 VITALS
HEART RATE: 90 BPM | TEMPERATURE: 96.1 F | DIASTOLIC BLOOD PRESSURE: 105 MMHG | WEIGHT: 208 LBS | SYSTOLIC BLOOD PRESSURE: 186 MMHG | HEIGHT: 71 IN | RESPIRATION RATE: 16 BRPM | BODY MASS INDEX: 29.12 KG/M2

## 2018-04-17 DIAGNOSIS — F41.9 ANXIETY AND DEPRESSION: ICD-10-CM

## 2018-04-17 DIAGNOSIS — I10 ESSENTIAL HYPERTENSION: ICD-10-CM

## 2018-04-17 DIAGNOSIS — D61.818 PANCYTOPENIA (HCC): ICD-10-CM

## 2018-04-17 DIAGNOSIS — E11.9 TYPE 2 DIABETES MELLITUS WITHOUT COMPLICATION, WITHOUT LONG-TERM CURRENT USE OF INSULIN (HCC): Primary | ICD-10-CM

## 2018-04-17 DIAGNOSIS — F32.A ANXIETY AND DEPRESSION: ICD-10-CM

## 2018-04-17 LAB — HBA1C MFR BLD HPLC: 6.4 %

## 2018-04-17 RX ORDER — GLIMEPIRIDE 2 MG/1
2 TABLET ORAL
Qty: 30 TAB | Refills: 1 | Status: SHIPPED | OUTPATIENT
Start: 2018-04-17 | End: 2018-06-09 | Stop reason: SDUPTHER

## 2018-04-17 RX ORDER — LISINOPRIL AND HYDROCHLOROTHIAZIDE 10; 12.5 MG/1; MG/1
1 TABLET ORAL DAILY
Qty: 30 TAB | Refills: 1 | Status: SHIPPED | OUTPATIENT
Start: 2018-04-17 | End: 2018-06-09 | Stop reason: SDUPTHER

## 2018-04-17 NOTE — PROGRESS NOTES
1. Have you been to the ER, urgent care clinic since your last visit? Hospitalized since your last visit? No    2. Have you seen or consulted any other health care providers outside of the [address] [place] [address] since your last visit? Include any pap smears or colon screening.  No

## 2018-04-17 NOTE — PATIENT INSTRUCTIONS
DASH Diet: Care Instructions  Your Care Instructions    The DASH diet is an eating plan that can help lower your blood pressure. DASH stands for Dietary Approaches to Stop Hypertension. Hypertension is high blood pressure. The DASH diet focuses on eating foods that are high in calcium, potassium, and magnesium. These nutrients can lower blood pressure. The foods that are highest in these nutrients are fruits, vegetables, low-fat dairy products, nuts, seeds, and legumes. But taking calcium, potassium, and magnesium supplements instead of eating foods that are high in those nutrients does not have the same effect. The DASH diet also includes whole grains, fish, and poultry. The DASH diet is one of several lifestyle changes your doctor may recommend to lower your high blood pressure. Your doctor may also want you to decrease the amount of sodium in your diet. Lowering sodium while following the DASH diet can lower blood pressure even further than just the DASH diet alone. Follow-up care is a key part of your treatment and safety. Be sure to make and go to all appointments, and call your doctor if you are having problems. It's also a good idea to know your test results and keep a list of the medicines you take. How can you care for yourself at home? Following the DASH diet  · Eat 4 to 5 servings of fruit each day. A serving is 1 medium-sized piece of fruit, ½ cup chopped or canned fruit, 1/4 cup dried fruit, or 4 ounces (½ cup) of fruit juice. Choose fruit more often than fruit juice. · Eat 4 to 5 servings of vegetables each day. A serving is 1 cup of lettuce or raw leafy vegetables, ½ cup of chopped or cooked vegetables, or 4 ounces (½ cup) of vegetable juice. Choose vegetables more often than vegetable juice. · Get 2 to 3 servings of low-fat and fat-free dairy each day. A serving is 8 ounces of milk, 1 cup of yogurt, or 1 ½ ounces of cheese. · Eat 6 to 8 servings of grains each day.  A serving is 1 slice of bread, 1 ounce of dry cereal, or ½ cup of cooked rice, pasta, or cooked cereal. Try to choose whole-grain products as much as possible. · Limit lean meat, poultry, and fish to 2 servings each day. A serving is 3 ounces, about the size of a deck of cards. · Eat 4 to 5 servings of nuts, seeds, and legumes (cooked dried beans, lentils, and split peas) each week. A serving is 1/3 cup of nuts, 2 tablespoons of seeds, or ½ cup of cooked beans or peas. · Limit fats and oils to 2 to 3 servings each day. A serving is 1 teaspoon of vegetable oil or 2 tablespoons of salad dressing. · Limit sweets and added sugars to 5 servings or less a week. A serving is 1 tablespoon jelly or jam, ½ cup sorbet, or 1 cup of lemonade. · Eat less than 2,300 milligrams (mg) of sodium a day. If you limit your sodium to 1,500 mg a day, you can lower your blood pressure even more. Tips for success  · Start small. Do not try to make dramatic changes to your diet all at once. You might feel that you are missing out on your favorite foods and then be more likely to not follow the plan. Make small changes, and stick with them. Once those changes become habit, add a few more changes. · Try some of the following:  ¨ Make it a goal to eat a fruit or vegetable at every meal and at snacks. This will make it easy to get the recommended amount of fruits and vegetables each day. ¨ Try yogurt topped with fruit and nuts for a snack or healthy dessert. ¨ Add lettuce, tomato, cucumber, and onion to sandwiches. ¨ Combine a ready-made pizza crust with low-fat mozzarella cheese and lots of vegetable toppings. Try using tomatoes, squash, spinach, broccoli, carrots, cauliflower, and onions. ¨ Have a variety of cut-up vegetables with a low-fat dip as an appetizer instead of chips and dip. ¨ Sprinkle sunflower seeds or chopped almonds over salads. Or try adding chopped walnuts or almonds to cooked vegetables.   ¨ Try some vegetarian meals using beans and peas. Add garbanzo or kidney beans to salads. Make burritos and tacos with mashed silva beans or black beans. Where can you learn more? Go to http://jace-katherine.info/. Enter W839 in the search box to learn more about \"DASH Diet: Care Instructions. \"  Current as of: September 21, 2016  Content Version: 11.4  © 7331-6444 [employer]. Care instructions adapted under license by [employer] (which disclaims liability or warranty for this information). If you have questions about a medical condition or this instruction, always ask your healthcare professional. [address] any warranty or liability for your use of this information. Low Sodium Diet (2,000 Milligram): Care Instructions  Your Care Instructions    Too much sodium causes your body to hold on to extra water. This can raise your blood pressure and force your heart and kidneys to work harder. In very serious cases, this could cause you to be put in the hospital. It might even be life-threatening. By limiting sodium, you will feel better and lower your risk of serious problems. The most common source of sodium is salt. People get most of the salt in their diet from canned, prepared, and packaged foods. Fast food and restaurant meals also are very high in sodium. Your doctor will probably limit your sodium to less than 2,000 milligrams (mg) a day. This limit counts all the sodium in prepared and packaged foods and any salt you add to your food. Follow-up care is a key part of your treatment and safety. Be sure to make and go to all appointments, and call your doctor if you are having problems. It's also a good idea to know your test results and keep a list of the medicines you take. How can you care for yourself at home? Read food labels  · Read labels on cans and food packages. The labels tell you how much sodium is in each serving. Make sure that you look at the serving size.  If you eat more than the serving size, you have eaten more sodium. · Food labels also tell you the Percent Daily Value for sodium. Choose products with low Percent Daily Values for sodium. · Be aware that sodium can come in forms other than salt, including monosodium glutamate (MSG), sodium citrate, and sodium bicarbonate (baking soda). MSG is often added to Asian food. When you eat out, you can sometimes ask for food without MSG or added salt. Buy low-sodium foods  · Buy foods that are labeled \"unsalted\" (no salt added), \"sodium-free\" (less than 5 mg of sodium per serving), or \"low-sodium\" (less than 140 mg of sodium per serving). Foods labeled \"reduced-sodium\" and \"light sodium\" may still have too much sodium. Be sure to read the label to see how much sodium you are getting. · Buy fresh vegetables, or frozen vegetables without added sauces. Buy low-sodium versions of canned vegetables, soups, and other canned goods. Prepare low-sodium meals  · Cut back on the amount of salt you use in cooking. This will help you adjust to the taste. Do not add salt after cooking. One teaspoon of salt has about 2,300 mg of sodium. · Take the salt shaker off the table. · Flavor your food with garlic, lemon juice, onion, vinegar, herbs, and spices. Do not use soy sauce, lite soy sauce, steak sauce, onion salt, garlic salt, celery salt, mustard, or ketchup on your food. · Use low-sodium salad dressings, sauces, and ketchup. Or make your own salad dressings and sauces without adding salt. · Use less salt (or none) when recipes call for it. You can often use half the salt a recipe calls for without losing flavor. Other foods such as rice, pasta, and grains do not need added salt. · Rinse canned vegetables, and cook them in fresh water. This removes some-but not all-of the salt. · Avoid water that is naturally high in sodium or that has been treated with water softeners, which add sodium.  Call your local water company to find out the sodium content of your water supply. If you buy bottled water, read the label and choose a sodium-free brand. Avoid high-sodium foods  · Avoid eating:  ¨ Smoked, cured, salted, and canned meat, fish, and poultry. ¨ Ham, contreras, hot dogs, and luncheon meats. ¨ Regular, hard, and processed cheese and regular peanut butter. ¨ Crackers with salted tops, and other salted snack foods such as pretzels, chips, and salted popcorn. ¨ Frozen prepared meals, unless labeled low-sodium. ¨ Canned and dried soups, broths, and bouillon, unless labeled sodium-free or low-sodium. ¨ Canned vegetables, unless labeled sodium-free or low-sodium. ¨ [place] fries, pizza, tacos, and other fast foods. ¨ Pickles, olives, ketchup, and other condiments, especially soy sauce, unless labeled sodium-free or low-sodium. Where can you learn more? Go to http://jace-katherine.info/. Enter R142 in the search box to learn more about \"Low Sodium Diet (2,000 Milligram): Care Instructions. \"  Current as of: May 12, 2017  Content Version: 11.4  © 2400-2257 [employer]. Care instructions adapted under license by [employer] (which disclaims liability or warranty for this information). If you have questions about a medical condition or this instruction, always ask your healthcare professional. [address] [address] any warranty or liability for your use of this information. How to Read a Food Label to Limit Sodium: Care Instructions  Your Care Instructions  Sodium causes your body to hold on to extra water. This can raise your blood pressure and force your heart and kidneys to work harder. In very serious cases, this could cause you to be put in the hospital. It might even be life-threatening. By limiting sodium, you will feel better and lower your risk of serious problems. Processed foods, fast food, and restaurant foods are the major sources of dietary sodium.  The most common name for sodium is salt. Try to limit how much sodium you eat to less than 2,300 milligrams (mg) a day. If you limit your sodium to 1,500 mg a day, you can lower your blood pressure even more. This limit counts all the salt that you eat in foods you cook or in packaged foods. Keep a list of everything you eat and drink. Follow-up care is a key part of your treatment and safety. Be sure to make and go to all appointments, and call your doctor if you are having problems. It's also a good idea to know your test results and keep a list of the medicines you take. How can you care for yourself at home? Read ingredient lists on food labels  · Read the list of ingredients on food labels to help you find how much sodium is in a food. The label lists the ingredients in a food in descending order (from the most to the least). If salt or sodium is high on the list, there may be a lot of sodium in the food. · Know that sodium has different names. Sodium is also called monosodium glutamate (MSG, common in [facility] food), sodium citrate, sodium alginate, sodium hydroxide, and sodium phosphate. Read Nutrition Facts labels  · On most foods, there is a Nutrition Facts label. This will tell you how much sodium is in one serving of food. Look at both the serving size and the sodium amount. The serving size is located at the top of the label, usually right under the \"Nutrition Facts\" title. The amount of sodium is given in the list under the title. It is given in milligrams (mg). ¨ Check the serving size carefully. A single serving is often very small, and you may eat more than one serving. If this is the case, you will eat more sodium than listed on the label. For example, if the serving size for a canned soup is 1 cup and the sodium amount is 470 mg, if you have 2 cups you will eat 940 mg of sodium. · The nutrition facts for fresh fruits and vegetables are not listed on the food. They may be listed somewhere in the store.  These foods usually have no sodium or low sodium. · The Nutrition Facts label also gives you the Percent Daily Value for sodium. This is how much of the recommended amount of sodium a serving contains. The daily value for sodium is less than 2,300 mg. So if the Percent Daily Value says 50%, this means one serving is giving you half of this, or 1,150 mg. Buy low-sodium foods  · Look for foods that are made with less sodium. Watch for the following words on the label. ¨ \"Unsalted\" means there is no sodium added to the food. But there may be sodium already in the food naturally. ¨ \"Sodium-free\" means a serving has less than 5 mg of sodium. ¨ \"Very low sodium\" means a serving has 35 mg or less of sodium. ¨ \"Low-sodium\" means a serving has 140 mg or less of sodium. · \"Reduced-sodium\" means that there is 25% less sodium than what the food normally has. This is still usually too much sodium. Try not to buy foods with this on the label. · Buy fresh vegetables, or frozen vegetables without added sauces. Buy low-sodium versions of canned vegetables, soups, and other canned goods. Where can you learn more? Go to http://jace-katherine.info/. Enter 26 127395 in the search box to learn more about \"How to Read a Food Label to Limit Sodium: Care Instructions. \"  Current as of: May 12, 2017  Content Version: 11.4  © 2267-9142 [employer]. Care instructions adapted under license by [employer] (which disclaims liability or warranty for this information). If you have questions about a medical condition or this instruction, always ask your healthcare professional. [address] [address] any warranty or liability for your use of this information.

## 2018-04-17 NOTE — PROGRESS NOTES
[facility]    CC: F/U for chronic disease management    HPI:     Pancytopenia:  - Got requested blood work  - Has been taking iron supplement as prescribed  - Has not seen Hematologist  - Has had some issues with easy bruising and bleeding  - Denies any fatigue         HTN:  - Has been taking his medication as prescribed  - Denies any side effects or issues with his medication  - Does not check BP at home  - Not following a low sodium or DASH diet       DMII:  - Has been taking diabetes medication as prescribed  - Denies any side effects from the medication  - Reports that recently his FBS have getting worse and have not been at goal. Home FBS range of   - Currently not follow any exercise routine  - Trying to follow a low sugar diet   - Requests that his medication be adjusted due to his worsening home blood sugars       Depression and Anxiety:  - Taking trazodone and quetiapine regimen as prescribed  - Denies any side effects or issues with his medication  - Mood issues have been well controlled on his current regimen       ROS: Positive items marked in RED  CON: fever, chills  Cardiovascular: palpitations, CP  Resp: cough, hemoptysis, SOB  GI: vomiting, diarrhea, melena, hematemesis, hematochezia  : dysuria, hematuria      Past Medical History:   Diagnosis Date    CAD (coronary artery disease)     \"cardiac stent placed 1 year ago\"    Depression     Diabetes ([facility] [address]     Hypertension     Substance abuse     Suicidal thoughts        Past Surgical History:   Procedure Laterality Date    HX BACK SURGERY  2014    HX CORONARY STENT PLACEMENT  2012    No stent placed       Family History   Problem Relation Age of Onset    Suicide Father     Diabetes Brother     Psychiatric Disorder Other        Social History     Social History    Marital status:      Spouse name: N/A    Number of children: N/A    Years of education: N/A     Social History Main Topics    Smoking status: Never Smoker    Smokeless tobacco: Never Used    Alcohol use No    Drug use: No    Sexual activity: Not Currently     Other Topics Concern    None     Social History Narrative     X 2. Lives with wife and 21year old son. 6year old son was taken away by . Unemployed        Allergies   Allergen Reactions    Aleve [Naproxen Sodium] Itching and Hives    Naproxen Hives         Current Outpatient Prescriptions:     ferrous sulfate 325 mg (65 mg iron) tablet, take 1 tablet by mouth once daily, Disp: 30 Tab, Rfl: 0    QUEtiapine (SEROQUEL) 300 mg tablet, Take 1 Tab by mouth daily. , Disp: 90 Tab, Rfl: 1    gabapentin (NEURONTIN) 300 mg capsule, Take 1 Cap by mouth three (3) times daily. , Disp: 270 Cap, Rfl: 0    metFORMIN (GLUCOPHAGE) 1,000 mg tablet, take 1 tablet by mouth twice a day with meals, Disp: 180 Tab, Rfl: 0    glimepiride (AMARYL) 1 mg tablet, Take 1 Tab by mouth Daily (before breakfast). , Disp: 30 Tab, Rfl: 1    lisinopril (PRINIVIL, ZESTRIL) 5 mg tablet, Take 1 Tab by mouth daily. , Disp: 90 Tab, Rfl: 1    traZODone (DESYREL) 100 mg tablet, Take 1 Tab by mouth nightly., Disp: 90 Tab, Rfl: 0    Physical Exam:      BP (!) 172/97  Pulse 90  Temp 96.1 °F (35.6 °C) (Oral)   Resp 16  Ht 5' 11\" (1.803 m)  Wt 208 lb (94.3 kg)  BMI 29.01 kg/m2    General:  WD, WN, NAD  Eyes: sclera clear bilaterally, no discharge noted, eyelids normal in appearance  HENT: NCAT  Lungs: CTAB, Normal respiratory effort and rate  CV: RRR, no MRGs  ABD: soft, non-tender, non-distended, normal bowel sounds, easily reducible nontender umbilical hernia  Ext: no peripheral edema or digital cyanosis noted  Skin: normal temperature, turgor, color, and texture  Psych: alert and oriented to person, place and time, normal affect  Neuro: Speech normal, Moving all extremities, gait normal    Results for Jackson Combs (MRN [id number]):   Ref.  Range 4/17/2018 10:45   Hemoglobin A1c (POC) Latest Units: % 6.4 Ref. Range 3/14/2018 09:53   WBC Latest Ref Range: 3.4 - 10.8 x10E3/uL 2.0 (LL)   RBC Latest Ref Range: 4.14 - 5.80 x10E6/uL 4.17   HGB Latest Ref Range: 13.0 - 17.7 g/dL 12.1 (L)   HCT Latest Ref Range: 37.5 - 51.0 % 35.9 (L)   MCV Latest Ref Range: 79 - 97 fL 86   MCH Latest Ref Range: 26.6 - 33.0 pg 29.0   MCHC Latest Ref Range: 31.5 - 35.7 g/dL 33.7   RDW Latest Ref Range: 12.3 - 15.4 % 17.3 (H)   PLATELET Latest Ref Range: 150 - 379 x10E3/uL 65 (LL)   NEUTROPHILS Latest Ref Range: Not Estab. % 61   Lymphocytes Latest Ref Range: Not Estab. % 28   MONOCYTES Latest Ref Range: Not Estab. % 6   EOSINOPHILS Latest Ref Range: Not Estab. % 3   BASOPHILS Latest Ref Range: Not Estab. % 1   IMMATURE GRANULOCYTES Latest Ref Range: Not Estab. % 1   ABS. NEUTROPHILS Latest Ref Range: 1.4 - 7.0 x10E3/uL 1.2 (L)   ABS. IMM. GRANS. Latest Ref Range: 0.0 - 0.1 x10E3/uL 0.0   Abs Lymphocytes Latest Ref Range: 0.7 - 3.1 x10E3/uL 0.6 (L)   ABS. MONOCYTES Latest Ref Range: 0.1 - 0.9 x10E3/uL 0.1   ABS. EOSINOPHILS Latest Ref Range: 0.0 - 0.4 x10E3/uL 0.1   ABS. BASOPHILS Latest Ref Range: 0.0 - 0.2 x10E3/uL 0.0   Reticulocyte count Latest Ref Range: 0.6 - 2.6 % 1.4      Ref. Range 3/14/2018 09:53   INR Latest Ref Range: 0.8 - 1.2  1.1   Prothrombin time Latest Ref Range: 9.1 - 12.0 sec 12.0   aPTT Latest Ref Range: 24 - 33 sec 26      Ref.  Range 3/14/2018 09:53   Iron Latest Ref Range: 38 - 169 ug/dL 56   TIBC Latest Ref Range: 250 - 450 ug/dL 295   Iron % saturation Latest Ref Range: 15 - 55 % 19   UIBC Latest Ref Range: 111 - 343 ug/dL 239   Ferritin Latest Ref Range: 30 - 400 ng/mL 28 (L)     Component Results      Component Value Flag Ref Range Units Status     HIV SCREEN 4TH GENERATION WRFX Non Reactive  Non Reactive  Final         Component Results      Component Value Flag Ref Range Units Status     WBC Comment    Final     Comment:     Absolute Counts:  neutrophils low at 0.9 X and lymphocytes   low at 0.9 X the lower RI limit for each, respectively;   other subsets low normal.   Total WBC Count:  low at 0.6 X the lower RI limit. The cause of the leukopenia, accentuated in the neutrophil   and lymphocyte cell lines, is not morphologically evident. Reactivity (approximate):   Neutrophils:  6%   Lymphocytes:  18%, mostly immuno-reactive. Monocytes:  < 3%   All reactivity is mild. Immaturity not observed.        RBC Comment    Final     Comment:     Mild anemia is present (hgb = 12.1) principally due to a low   normal/borderline low RBC count, which is aggravated by a   mid normal MCV on the negative side of its RI mean, yielding   the anemia. The MCHC remains mid normal without significant trending.        Platelets Comment (A)   Final     Comment:     Thrombocytopenia. Morphology normal. Cause not evident. The platelet count is low at 0.4 X the lower limit of the   RI. Minor platelet aggregation and clumping are noted. The   estimated count from the smear at 79,000 is similar to the   instrument count of 65,000.        Comments Comment    Final     Comment:     Note is made of the low or borderline low values for all   three major cell lines, which is consistent with the history   of pancytopenia.        Pathologist Comment    Final     Comment:     Reviewed by: Corinne Flores MD, Pathologist       Assessment/Plan     HTN, Inadequately Controlled:  - Counseled on lifestyle changes.   - Will increase lisinopril and start on HCTZ regimen  - Handout given on DASH/low sodium diet and reading food labels for sodium content  - Follow up in one month      Pancytopenia, Stable:  - Etiology is unclear  - Will continue current iron supplement  - Will defer further evaluation/treatment to hematology  - Follow up in one month        DMII:  - Home FBS not at goal of <130  - Cause of recent exacerbation of home blood sugar unclear  - Will increase glimepiride dose  - Follow up in one month       Depression and Anxiety, Well Controlled:  - Will continue current trazodone and quetiapine regimen  - Follow up as needed        Suzi Olmos MD  4/17/2018, 10:23 AM

## 2018-04-17 NOTE — MR AVS SNAPSHOT
Osvaldo Burgess [place] [address] [facility] [address] [facility] 83 [id number] 
[phone] Patient: Caprice Joel MRN: [id number] :1960 Visit Information Date & Time Provider Department Dept. Phone Encounter #  
 2018  9:30 AM [facility] [phone] [id number] Follow-up Instructions Return in about 1 month (around 2018). Upcoming Health Maintenance Date Due Hepatitis C Screening 1960 LIPID PANEL Q1 1961 FOOT EXAM Q1 1971 MICROALBUMIN Q1 1971 EYE EXAM RETINAL OR DILATED Q1 1971 DTaP/Tdap/Td series (1 - Tdap) 1982 FOBT Q 1 YEAR AGE 50-75 2011 Pneumococcal 19-64 Highest Risk (2 of 3 - PCV13) 2014 HEMOGLOBIN A1C Q6M 7/15/2018 Allergies as of 2018  Review Complete On: 2018 By: Edwige Russ LPN Severity Noted Reaction Type Reactions Aleve [Naproxen Sodium]  2011    Itching, Hives Naproxen  2014    Hives Current Immunizations  Reviewed on 3/14/2018 Name Date Influenza Vaccine 2015 12:00 AM, 10/15/2014 12:00 AM, 10/9/2013 12:00 AM  
 Influenza Vaccine (Quad) PF 2018 11:13 AM  
 Pneumococcal Polysaccharide (PPSV-23) 2013 12:00 AM  
  
 Not reviewed this visit You Were Diagnosed With   
  
 Codes Comments Type 2 diabetes mellitus without complication, without long-term current use of insulin (HCC)    -  Primary ICD-10-CM: E11.9 ICD-9-CM: 250.00 Essential hypertension     ICD-10-CM: I10 
ICD-9-CM: 401.9 Anxiety and depression     ICD-10-CM: F41.9, F32.9 ICD-9-CM: 300.00, 311 Pancytopenia ([address]     ICD-10-CM: R78.167 ICD-9-CM: 284.19 Vitals BP Pulse Temp Resp Height(growth percentile) Weight(growth percentile) (!) 186/105 90 96.1 °F (35.6 °C) (Oral) 16 5' 11\" (1.803 m) 208 lb (94.3 kg) BMI Smoking Status 29.01 kg/m2 Never Smoker Vitals History BMI and BSA Data Body Mass Index Body Surface Area  
 29.01 kg/m 2 2.17 m 2 Preferred Pharmacy Pharmacy Name Phone [address] [facility] [address], [address] [place] [address] [phone] Your Updated Medication List  
  
   
This list is accurate as of 4/17/18 11:18 AM.  Always use your most recent med list.  
  
  
  
  
 ferrous sulfate 325 mg (65 mg iron) tablet  
take 1 tablet by mouth once daily  
  
 gabapentin 300 mg capsule Commonly known as:  NEURONTIN Take 1 Cap by mouth three (3) times daily. glimepiride 2 mg tablet Commonly known as:  AMARYL Take 1 Tab by mouth every morning. glucose blood VI test strips strip Commonly known as:  ASCENSIA AUTODISC VI, ONE TOUCH ULTRA TEST VI  
Use as directed to test blood sugar twice a day  
  
 lisinopril-hydroCHLOROthiazide 10-12.5 mg per tablet Commonly known as:  Thomasena Alu Take 1 Tab by mouth daily. metFORMIN 1,000 mg tablet Commonly known as:  GLUCOPHAGE  
take 1 tablet by mouth twice a day with meals QUEtiapine 300 mg tablet Commonly known as:  SEROquel Take 1 Tab by mouth daily. traZODone 100 mg tablet Commonly known as:  Tiara Sanna Take 1 Tab by mouth nightly. Prescriptions Printed Refills  
 lisinopril-hydroCHLOROthiazide (PRINZIDE, ZESTORETIC) 10-12.5 mg per tablet 1 Sig: Take 1 Tab by mouth daily. Class: Print Route: Oral  
 glimepiride (AMARYL) 2 mg tablet 1 Sig: Take 1 Tab by mouth every morning. Class: Print Route: Oral  
 glucose blood VI test strips (ASCENSIA AUTODISC VI, ONE TOUCH ULTRA TEST VI) strip 6 Sig: Use as directed to test blood sugar twice a day Class: Print We Performed the Following AMB POC HEMOGLOBIN A1C [49332 CPT(R)] Follow-up Instructions Return in about 1 month (around 5/17/2018). Patient Instructions DASH Diet: Care Instructions Your Care Instructions The DASH diet is an eating plan that can help lower your blood pressure. DASH stands for Dietary Approaches to Stop Hypertension. Hypertension is high blood pressure. The DASH diet focuses on eating foods that are high in calcium, potassium, and magnesium. These nutrients can lower blood pressure. The foods that are highest in these nutrients are fruits, vegetables, low-fat dairy products, nuts, seeds, and legumes. But taking calcium, potassium, and magnesium supplements instead of eating foods that are high in those nutrients does not have the same effect. The DASH diet also includes whole grains, fish, and poultry. The DASH diet is one of several lifestyle changes your doctor may recommend to lower your high blood pressure. Your doctor may also want you to decrease the amount of sodium in your diet. Lowering sodium while following the DASH diet can lower blood pressure even further than just the DASH diet alone. Follow-up care is a key part of your treatment and safety. Be sure to make and go to all appointments, and call your doctor if you are having problems. It's also a good idea to know your test results and keep a list of the medicines you take. How can you care for yourself at home? Following the DASH diet · Eat 4 to 5 servings of fruit each day. A serving is 1 medium-sized piece of fruit, ½ cup chopped or canned fruit, 1/4 cup dried fruit, or 4 ounces (½ cup) of fruit juice. Choose fruit more often than fruit juice. · Eat 4 to 5 servings of vegetables each day. A serving is 1 cup of lettuce or raw leafy vegetables, ½ cup of chopped or cooked vegetables, or 4 ounces (½ cup) of vegetable juice. Choose vegetables more often than vegetable juice. · Get 2 to 3 servings of low-fat and fat-free dairy each day. A serving is 8 ounces of milk, 1 cup of yogurt, or 1 ½ ounces of cheese. · Eat 6 to 8 servings of grains each day. A serving is 1 slice of bread, 1 ounce of dry cereal, or ½ cup of cooked rice, pasta, or cooked cereal. Try to choose whole-grain products as much as possible. · Limit lean meat, poultry, and fish to 2 servings each day. A serving is 3 ounces, about the size of a deck of cards. · Eat 4 to 5 servings of nuts, seeds, and legumes (cooked dried beans, lentils, and split peas) each week. A serving is 1/3 cup of nuts, 2 tablespoons of seeds, or ½ cup of cooked beans or peas. · Limit fats and oils to 2 to 3 servings each day. A serving is 1 teaspoon of vegetable oil or 2 tablespoons of salad dressing. · Limit sweets and added sugars to 5 servings or less a week. A serving is 1 tablespoon jelly or jam, ½ cup sorbet, or 1 cup of lemonade. · Eat less than 2,300 milligrams (mg) of sodium a day. If you limit your sodium to 1,500 mg a day, you can lower your blood pressure even more. Tips for success · Start small. Do not try to make dramatic changes to your diet all at once. You might feel that you are missing out on your favorite foods and then be more likely to not follow the plan. Make small changes, and stick with them. Once those changes become habit, add a few more changes. · Try some of the following: ¨ Make it a goal to eat a fruit or vegetable at every meal and at snacks. This will make it easy to get the recommended amount of fruits and vegetables each day. ¨ Try yogurt topped with fruit and nuts for a snack or healthy dessert. ¨ Add lettuce, tomato, cucumber, and onion to sandwiches. ¨ Combine a ready-made pizza crust with low-fat mozzarella cheese and lots of vegetable toppings. Try using tomatoes, squash, spinach, broccoli, carrots, cauliflower, and onions. ¨ Have a variety of cut-up vegetables with a low-fat dip as an appetizer instead of chips and dip. ¨ Sprinkle sunflower seeds or chopped almonds over salads.  Or try adding chopped walnuts or almonds to cooked vegetables. ¨ Try some vegetarian meals using beans and peas. Add garbanzo or kidney beans to salads. Make burritos and tacos with mashed silva beans or black beans. Where can you learn more? Go to http://jace-katherine.info/. Enter X388 in the search box to learn more about \"DASH Diet: Care Instructions. \" Current as of: September 21, 2016 Content Version: 11.4 © 2602-6722 [employer]. Care instructions adapted under license by [employer] (which disclaims liability or warranty for this information). If you have questions about a medical condition or this instruction, always ask your healthcare professional. [address] any warranty or liability for your use of this information. Low Sodium Diet (2,000 Milligram): Care Instructions Your Care Instructions Too much sodium causes your body to hold on to extra water. This can raise your blood pressure and force your heart and kidneys to work harder. In very serious cases, this could cause you to be put in the hospital. It might even be life-threatening. By limiting sodium, you will feel better and lower your risk of serious problems. The most common source of sodium is salt. People get most of the salt in their diet from canned, prepared, and packaged foods. Fast food and restaurant meals also are very high in sodium. Your doctor will probably limit your sodium to less than 2,000 milligrams (mg) a day. This limit counts all the sodium in prepared and packaged foods and any salt you add to your food. Follow-up care is a key part of your treatment and safety. Be sure to make and go to all appointments, and call your doctor if you are having problems. It's also a good idea to know your test results and keep a list of the medicines you take. How can you care for yourself at home? Read food labels · Read labels on cans and food packages. The labels tell you how much sodium is in each serving. Make sure that you look at the serving size. If you eat more than the serving size, you have eaten more sodium. · Food labels also tell you the Percent Daily Value for sodium. Choose products with low Percent Daily Values for sodium. · Be aware that sodium can come in forms other than salt, including monosodium glutamate (MSG), sodium citrate, and sodium bicarbonate (baking soda). MSG is often added to Asian food. When you eat out, you can sometimes ask for food without MSG or added salt. Buy low-sodium foods · Buy foods that are labeled \"unsalted\" (no salt added), \"sodium-free\" (less than 5 mg of sodium per serving), or \"low-sodium\" (less than 140 mg of sodium per serving). Foods labeled \"reduced-sodium\" and \"light sodium\" may still have too much sodium. Be sure to read the label to see how much sodium you are getting. · Buy fresh vegetables, or frozen vegetables without added sauces. Buy low-sodium versions of canned vegetables, soups, and other canned goods. Prepare low-sodium meals · Cut back on the amount of salt you use in cooking. This will help you adjust to the taste. Do not add salt after cooking. One teaspoon of salt has about 2,300 mg of sodium. · Take the salt shaker off the table. · Flavor your food with garlic, lemon juice, onion, vinegar, herbs, and spices. Do not use soy sauce, lite soy sauce, steak sauce, onion salt, garlic salt, celery salt, mustard, or ketchup on your food. · Use low-sodium salad dressings, sauces, and ketchup. Or make your own salad dressings and sauces without adding salt. · Use less salt (or none) when recipes call for it. You can often use half the salt a recipe calls for without losing flavor. Other foods such as rice, pasta, and grains do not need added salt. · Rinse canned vegetables, and cook them in fresh water. This removes some-but not all-of the salt. · Avoid water that is naturally high in sodium or that has been treated with water softeners, which add sodium. Call your local water company to find out the sodium content of your water supply. If you buy bottled water, read the label and choose a sodium-free brand. Avoid high-sodium foods · Avoid eating: ¨ Smoked, cured, salted, and canned meat, fish, and poultry. ¨ Ham, contreras, hot dogs, and luncheon meats. ¨ Regular, hard, and processed cheese and regular peanut butter. ¨ Crackers with salted tops, and other salted snack foods such as pretzels, chips, and salted popcorn. ¨ Frozen prepared meals, unless labeled low-sodium. ¨ Canned and dried soups, broths, and bouillon, unless labeled sodium-free or low-sodium. ¨ Canned vegetables, unless labeled sodium-free or low-sodium. ¨ Western Bianca fries, pizza, tacos, and other fast foods. ¨ Pickles, olives, ketchup, and other condiments, especially soy sauce, unless labeled sodium-free or low-sodium. Where can you learn more? Go to http://jace-katherine.info/. Enter L934 in the search box to learn more about \"Low Sodium Diet (2,000 Milligram): Care Instructions. \" Current as of: May 12, 2017 Content Version: 11.4 © 5667-4166 [employer]. Care instructions adapted under license by [employer] (which disclaims liability or warranty for this information). If you have questions about a medical condition or this instruction, always ask your healthcare professional. [place] [address] any warranty or liability for your use of this information. How to Read a Food Label to Limit Sodium: Care Instructions Your Care Instructions Sodium causes your body to hold on to extra water. This can raise your blood pressure and force your heart and kidneys to work harder. In very serious cases, this could cause you to be put in the hospital. It might even be life-threatening.  By limiting sodium, you will feel better and lower your risk of serious problems. Processed foods, fast food, and restaurant foods are the major sources of dietary sodium. The most common name for sodium is salt. Try to limit how much sodium you eat to less than 2,300 milligrams (mg) a day. If you limit your sodium to 1,500 mg a day, you can lower your blood pressure even more. This limit counts all the salt that you eat in foods you cook or in packaged foods. Keep a list of everything you eat and drink. Follow-up care is a key part of your treatment and safety. Be sure to make and go to all appointments, and call your doctor if you are having problems. It's also a good idea to know your test results and keep a list of the medicines you take. How can you care for yourself at home? Read ingredient lists on food labels · Read the list of ingredients on food labels to help you find how much sodium is in a food. The label lists the ingredients in a food in descending order (from the most to the least). If salt or sodium is high on the list, there may be a lot of sodium in the food. · Know that sodium has different names. Sodium is also called monosodium glutamate (MSG, common in [facility] food), sodium citrate, sodium alginate, sodium hydroxide, and sodium phosphate. Read Nutrition Facts labels · On most foods, there is a Nutrition Facts label. This will tell you how much sodium is in one serving of food. Look at both the serving size and the sodium amount. The serving size is located at the top of the label, usually right under the \"Nutrition Facts\" title. The amount of sodium is given in the list under the title. It is given in milligrams (mg). ¨ Check the serving size carefully. A single serving is often very small, and you may eat more than one serving. If this is the case, you will eat more sodium than listed on the label.  For example, if the serving size for a canned soup is 1 cup and the sodium amount is 470 mg, if you have 2 cups you will eat 940 mg of sodium. · The nutrition facts for fresh fruits and vegetables are not listed on the food. They may be listed somewhere in the store. These foods usually have no sodium or low sodium. · The Nutrition Facts label also gives you the Percent Daily Value for sodium. This is how much of the recommended amount of sodium a serving contains. The daily value for sodium is less than 2,300 mg. So if the Percent Daily Value says 50%, this means one serving is giving you half of this, or 1,150 mg. Buy low-sodium foods · Look for foods that are made with less sodium. Watch for the following words on the label. ¨ \"Unsalted\" means there is no sodium added to the food. But there may be sodium already in the food naturally. ¨ \"Sodium-free\" means a serving has less than 5 mg of sodium. ¨ \"Very low sodium\" means a serving has 35 mg or less of sodium. ¨ \"Low-sodium\" means a serving has 140 mg or less of sodium. · \"Reduced-sodium\" means that there is 25% less sodium than what the food normally has. This is still usually too much sodium. Try not to buy foods with this on the label. · Buy fresh vegetables, or frozen vegetables without added sauces. Buy low-sodium versions of canned vegetables, soups, and other canned goods. Where can you learn more? Go to http://jace-katherine.info/. Enter 26 [id number] in the search box to learn more about \"How to Read a Food Label to Limit Sodium: Care Instructions. \" Current as of: May 12, 2017 Content Version: 11.4 © 5591-2450 [employer]. Care instructions adapted under license by [employer] (which disclaims liability or warranty for this information). If you have questions about a medical condition or this instruction, always ask your healthcare professional. [place] [address] any warranty or liability for your use of this information. Introducing [facility]! Eloy Muniz introduces Sandglaz patient portal. Now you can access parts of your medical record, email your doctor's office, and request medication refills online. 1. In your internet browser, go to [email or web address] 2. Click on the First Time User? Click Here link in the Sign In box. You will see the New Member Sign Up page. 3. Enter your Sandglaz Access Code exactly as it appears below. You will not need to use this code after youve completed the sign-up process. If you do not sign up before the expiration date, you must request a new code. · Sandglaz Access Code: [email or web address] Expires: 7/16/2018 11:13 AM 
 
4. Enter the last four digits of your Social Security Number (xxxx) and Date of Birth (mm/dd/yyyy) as indicated and click Submit. You will be taken to the next sign-up page. 5. Create a Sandglaz ID. This will be your Sandglaz login ID and cannot be changed, so think of one that is secure and easy to remember. 6. Create a Sandglaz password. You can change your password at any time. 7. Enter your Password Reset Question and Answer. This can be used at a later time if you forget your password. 8. Enter your e-mail address. You will receive e-mail notification when new information is available in [address] 9. Click Sign Up. You can now view and download portions of your medical record. 10. Click the Download Summary menu link to download a portable copy of your medical information. If you have questions, please visit the Frequently Asked Questions section of the Sandglaz website. Remember, Sandglaz is NOT to be used for urgent needs. For medical emergencies, dial 911. Now available from your iPhone and Android! Please provide this summary of care documentation to your next provider. Your primary care clinician is listed as Pallavi Soto. If you have any questions after today's visit, please call [phone].

## 2018-05-21 ENCOUNTER — HOSPITAL ENCOUNTER (OUTPATIENT)
Dept: LAB | Age: 58
Discharge: HOME | End: 2018-05-21
Payer: MEDICAID

## 2018-05-21 ENCOUNTER — OFFICE VISIT (OUTPATIENT)
Dept: FAMILY MEDICINE CLINIC | Age: 58
End: 2018-05-21

## 2018-05-21 VITALS
TEMPERATURE: 97.6 F | HEART RATE: 96 BPM | SYSTOLIC BLOOD PRESSURE: 120 MMHG | OXYGEN SATURATION: 98 % | HEIGHT: 71 IN | RESPIRATION RATE: 16 BRPM | WEIGHT: 210.4 LBS | BODY MASS INDEX: 29.46 KG/M2 | DIASTOLIC BLOOD PRESSURE: 76 MMHG

## 2018-05-21 DIAGNOSIS — R53.82 CHRONIC FATIGUE: ICD-10-CM

## 2018-05-21 DIAGNOSIS — D61.818 PANCYTOPENIA (HCC): ICD-10-CM

## 2018-05-21 DIAGNOSIS — M79.672 PAIN IN BOTH FEET: ICD-10-CM

## 2018-05-21 DIAGNOSIS — R53.82 CHRONIC FATIGUE: Primary | ICD-10-CM

## 2018-05-21 DIAGNOSIS — I10 ESSENTIAL HYPERTENSION: ICD-10-CM

## 2018-05-21 DIAGNOSIS — M79.671 PAIN IN BOTH FEET: ICD-10-CM

## 2018-05-21 DIAGNOSIS — E11.9 TYPE 2 DIABETES MELLITUS WITHOUT COMPLICATION, WITHOUT LONG-TERM CURRENT USE OF INSULIN (HCC): ICD-10-CM

## 2018-05-21 PROCEDURE — 84403 ASSAY OF TOTAL TESTOSTERONE: CPT | Performed by: FAMILY MEDICINE

## 2018-05-21 PROCEDURE — 36415 COLL VENOUS BLD VENIPUNCTURE: CPT | Performed by: FAMILY MEDICINE

## 2018-05-21 RX ORDER — DOXEPIN HYDROCHLORIDE 50 MG/G
CREAM TOPICAL
Qty: 90 G | Refills: 1 | Status: SHIPPED | OUTPATIENT
Start: 2018-05-21 | End: 2018-07-05

## 2018-05-21 RX ORDER — DICLOFENAC SODIUM 30 MG/G
GEL TOPICAL
Qty: 300 G | Refills: 1 | Status: SHIPPED | OUTPATIENT
Start: 2018-05-21 | End: 2018-07-05

## 2018-05-21 NOTE — PATIENT INSTRUCTIONS
Diabetes Foot Health: Care Instructions  Your Care Instructions    When you have diabetes, your feet need extra care and attention. Diabetes can damage the nerve endings and blood vessels in your feet, making you less likely to notice when your feet are injured. Diabetes also limits your body's ability to fight infection and get blood to areas that need it. If you get a minor foot injury, it could become an ulcer or a serious infection. With good foot care, you can prevent most of these problems. Caring for your feet can be quick and easy. Most of the care can be done when you are bathing or getting ready for bed. Follow-up care is a key part of your treatment and safety. Be sure to make and go to all appointments, and call your doctor if you are having problems. It's also a good idea to know your test results and keep a list of the medicines you take. How can you care for yourself at home? · Keep your blood sugar close to normal by watching what and how much you eat, monitoring blood sugar, taking medicines if prescribed, and getting regular exercise. · Do not smoke. Smoking affects blood flow and can make foot problems worse. If you need help quitting, talk to your doctor about stop-smoking programs and medicines. These can increase your chances of quitting for good. · Eat a diet that is low in fats. High fat intake can cause fat to build up in your blood vessels and decrease blood flow. · Inspect your feet daily for blisters, cuts, cracks, or sores. If you cannot see well, use a mirror or have someone help you. · Take care of your feet:  [facility] [place] [place] AUTHOR[employer] your feet every day. Use warm (not hot) water. Check the water temperature with your wrists or other part of your body, not your feet. ¨ Dry your feet well. Pat them dry. Do not rub the skin on your feet too hard. Dry well between your toes. If the skin on your feet stays moist, bacteria or a fungus can grow, which can lead to infection.   ¨ Keep your skin soft. Use moisturizing skin cream to keep the skin on your feet soft and prevent calluses and cracks. But do not put the cream between your toes, and stop using any cream that causes a rash. ¨ Clean underneath your toenails carefully. Do not use a sharp object to clean underneath your toenails. Use the blunt end of a nail file or other rounded tool. ¨ Trim and file your toenails straight across to prevent ingrown toenails. Use a nail clipper, not scissors. Use an emery board to smooth the edges. · Change socks daily. Socks without seams are best, because seams often rub the feet. You can find socks for people with diabetes from specialty catalogs. · Look inside your shoes every day for things like gravel or torn linings, which could cause blisters or sores. · Buy shoes that fit well:  ¨ Look for shoes that have plenty of space around the toes. This helps prevent bunions and blisters. ¨ Try on shoes while wearing the kind of socks you will usually wear with the shoes. ¨ Avoid plastic shoes. They may rub your feet and cause blisters. Good shoes should be made of materials that are flexible and breathable, such as leather or cloth. ¨ Break in new shoes slowly by wearing them for no more than an hour a day for several days. Take extra time to check your feet for red areas, blisters, or other problems after you wear new shoes. · Do not go barefoot. Do not wear sandals, and do not wear shoes with very thin soles. Thin soles are easy to puncture. They also do not protect your feet from hot pavement or cold weather. · Have your doctor check your feet during each visit. If you have a foot problem, see your doctor. Do not try to treat an early foot problem at home. Home remedies or treatments that you can buy without a prescription (such as corn removers) can be harmful. · Always get early treatment for foot problems. A minor irritation can lead to a major problem if not properly cared for early.   When should you call for help? Call your doctor now or seek immediate medical care if:  ? · You have a foot sore, an ulcer or break in the skin that is not healing after 4 days, bleeding corns or calluses, or an ingrown toenail. ? · You have blue or black areas, which can mean bruising or blood flow problems. ? · You have peeling skin or tiny blisters between your toes or cracking or oozing of the skin. ? · You have a fever for more than 24 hours and a foot sore. ? · You have new numbness or tingling in your feet that does not go away after you move your feet or change positions. ? · You have unexplained or unusual swelling of the foot or ankle. ? Watch closely for changes in your health, and be sure to contact your doctor if:  ? · You cannot do proper foot care. Where can you learn more? Go to http://jace-katherine.info/. Enter A739 in the search box to learn more about \"Diabetes Foot Health: Care Instructions. \"  Current as of: March 13, 2017  Content Version: 11.4  © 2051-0407 [employer]. Care instructions adapted under license by [employer] (which disclaims liability or warranty for this information). If you have questions about a medical condition or this instruction, always ask your healthcare professional. [address] any warranty or liability for your use of this information.

## 2018-05-21 NOTE — PROGRESS NOTES
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 work  - Continues to take iron supplement        DMII:  - Taking medications as prescribed  - Denies any side effects or issues with medications  - Checking glucose at home  - Reports FBS range of   - No log brought in for review  - Reports bilaterally foot pain due to diabetes  - Reports increased physical activity at work (May also be causing foot pain)  - Has not had annual foot exam        ROS: Positive items marked in RED  CON: fever, chills, fatigue (chronic issue)  Cardiovascular: palpitations, CP  Resp: cough, SOB  GI: nausea, vomiting, diarrhea      Past Medical History:   Diagnosis Date    CAD (coronary artery disease)     \"cardiac stent placed 1 year ago\"    Depression     Diabetes ([facility] [address]     Hypertension     Substance abuse     Suicidal thoughts        Past Surgical History:   Procedure Laterality Date    HX BACK SURGERY  2014    HX CORONARY STENT PLACEMENT  2012    No stent placed       Family History   Problem Relation Age of Onset    Suicide Father     Diabetes Brother     Psychiatric Disorder Other        Social History     Social History    Marital status:      Spouse name: N/A    Number of children: N/A    Years of education: N/A     Social History Main Topics    Smoking status: Never Smoker    Smokeless tobacco: Never Used    Alcohol use No    Drug use: No    Sexual activity: Not Currently     Other Topics Concern    None     Social History Narrative     X 2. Lives with wife and 21year old son. 6year old son was taken away by . Unemployed        Allergies   Allergen Reactions    Aleve [Naproxen Sodium] Itching and Hives    Naproxen Hives         Current Outpatient Prescriptions:     lisinopril-hydroCHLOROthiazide (PRINZIDE, ZESTORETIC) 10-12.5 mg per tablet, Take 1 Tab by mouth daily. , Disp: 30 Tab, Rfl: 1    glimepiride (AMARYL) 2 mg tablet, Take 1 Tab by mouth every morning., Disp: 30 Tab, Rfl: 1    glucose blood VI test strips (ASCENSIA AUTODISC VI, ONE TOUCH ULTRA TEST VI) strip, Use as directed to test blood sugar twice a day, Disp: 100 Strip, Rfl: 6    ferrous sulfate 325 mg (65 mg iron) tablet, take 1 tablet by mouth once daily, Disp: 30 Tab, Rfl: 0    QUEtiapine (SEROQUEL) 300 mg tablet, Take 1 Tab by mouth daily. , Disp: 90 Tab, Rfl: 1    gabapentin (NEURONTIN) 300 mg capsule, Take 1 Cap by mouth three (3) times daily. , Disp: 270 Cap, Rfl: 0    metFORMIN (GLUCOPHAGE) 1,000 mg tablet, take 1 tablet by mouth twice a day with meals, Disp: 180 Tab, Rfl: 0    traZODone (DESYREL) 100 mg tablet, Take 1 Tab by mouth nightly., Disp: 90 Tab, Rfl: 0      Physical Exam:      /76 (BP 1 Location: Left arm, BP Patient Position: Sitting)  Pulse 96  Temp 97.6 °F (36.4 °C) (Oral)   Resp 16  Ht 5' 11\" (1.803 m)  Wt 210 lb 6.4 oz (95.4 kg)  SpO2 98%  BMI 29.34 kg/m2    General:  WD, WN, NAD  Eyes: sclera clear bilaterally, no discharge noted, eyelids normal in appearance  HENT: NCAT  Lungs: CTAB, Normal respiratory effort and rate  CV: RRR, no MRGs  ABD: soft, non-tender, non-distended, normal bowel sounds, easily reducible non tender umbilical hernia  Ext: no peripheral edema or digital cyanosis noted  Skin: normal temperature, turgor, color, and texture  Psych: alert and oriented to person, place and time, normal affect  Neuro: Speech normal, Moving all extremities, gait normal      Assessment/Plan     DMII:  - Will continue current medication regimen  - Will refer to podiatry for foot exam  - Will do trial of topical doxepin for neuropathy and topical diclofenac for pain  - Handout given on Diabetes Foot Health      HTN, Well Controlled:  - At goal BP of <130/80  - Will continue current BP regimen      Chronic Fatigue:  - Suspect it is 2/2 depression and/or anemia  - Will check testosterone level  - Follow up in one month      Pancytopenia:  - Will continue current iron supplement, per recommendations of hematology  - Will defer further evaluation/treatment to hematology  - Need to obtain hematology note        Jewels Coreas MD  5/21/2018, 8:26 AM

## 2018-05-21 NOTE — PROGRESS NOTES
Chief Complaint   Patient presents with    Follow Up Chronic Condition     1. Have you been to the ER, urgent care clinic since your last visit? Hospitalized since your last visit? No    2. Have you seen or consulted any other health care providers outside of the [address] [place] [address] since your last visit? Include any pap smears or colon screening.  No

## 2018-05-21 NOTE — MR AVS SNAPSHOT
Osvaldo Burgess [facility] [address] [facility] [address] [facility] [address] [id number] 
[phone] Patient: Ramonia Dance MRN: [id number] :1960 Visit Information Date & Time Provider Department Dept. Phone Encounter #  
 2018  8:00 AM Sadaf Garner, [address] [id number] Follow-up Instructions Return in about 1 month (around 2018). Upcoming Health Maintenance Date Due Hepatitis C Screening 1960 LIPID PANEL Q1 1961 FOOT EXAM Q1 1971 MICROALBUMIN Q1 1971 EYE EXAM RETINAL OR DILATED Q1 1971 DTaP/Tdap/Td series (1 - Tdap) 1982 FOBT Q 1 YEAR AGE 50-75 2011 Pneumococcal 19-64 Highest Risk (2 of 3 - PCV13) 2014 Influenza Age 5 to Adult 2018 HEMOGLOBIN A1C Q6M 10/17/2018 Allergies as of 2018  Review Complete On: 2018 By: Sadaf Garner MD  
  
 Severity Noted Reaction Type Reactions Aleve [Naproxen Sodium]  2011    Itching, Hives Naproxen  2014    Hives Current Immunizations  Reviewed on 3/14/2018 Name Date Influenza Vaccine 2015 12:00 AM, 10/15/2014 12:00 AM, 10/9/2013 12:00 AM  
 Influenza Vaccine (Quad) PF 2018 11:13 AM  
 Pneumococcal Polysaccharide (PPSV-23) 2013 12:00 AM  
  
 Not reviewed this visit You Were Diagnosed With   
  
 Codes Comments Chronic fatigue    -  Primary ICD-10-CM: R53.82 
ICD-9-CM: 780.79 Pancytopenia ([facility] [address]     ICD-10-CM: S26.384 ICD-9-CM: 284.19 Essential hypertension     ICD-10-CM: I10 
ICD-9-CM: 401.9 Type 2 diabetes mellitus without complication, without long-term current use of insulin (HCC)     ICD-10-CM: E11.9 ICD-9-CM: 250.00 Pain in both feet     ICD-10-CM: M79.671, X86.669 ICD-9-CM: 729.5 Vitals BP Pulse Temp Resp Height(growth percentile) Weight(growth percentile) 120/76 (BP 1 Location: Left arm, BP Patient Position: Sitting) 96 97.6 °F (36.4 °C) (Oral) 16 5' 11\" (1.803 m) 210 lb 6.4 oz (95.4 kg) SpO2 BMI Smoking Status 98% 29.34 kg/m2 Never Smoker Vitals History BMI and BSA Data Body Mass Index Body Surface Area  
 29.34 kg/m 2 2.19 m 2 Preferred Pharmacy Pharmacy Name Phone [address], [address] [address] [address] [phone] Your Updated Medication List  
  
   
This list is accurate as of 5/21/18  8:58 AM.  Always use your most recent med list.  
  
  
  
  
 diclofenac 3 % topical gel Commonly known as:  Vivek Jaimes Apply 2.5 g to affected area 2 times daily  
  
 ferrous sulfate 325 mg (65 mg iron) tablet  
take 1 tablet by mouth once daily  
  
 gabapentin 300 mg capsule Commonly known as:  NEURONTIN Take 1 Cap by mouth three (3) times daily. glimepiride 2 mg tablet Commonly known as:  AMARYL Take 1 Tab by mouth every morning. glucose blood VI test strips strip Commonly known as:  ASCENSIA AUTODISC VI, ONE TOUCH ULTRA TEST VI  
Use as directed to test blood sugar twice a day  
  
 lisinopril-hydroCHLOROthiazide 10-12.5 mg per tablet Commonly known as:  Jessica Thiago Take 1 Tab by mouth daily. metFORMIN 1,000 mg tablet Commonly known as:  GLUCOPHAGE  
take 1 tablet by mouth twice a day with meals QUEtiapine 300 mg tablet Commonly known as:  SEROquel Take 1 Tab by mouth daily. traZODone 100 mg tablet Commonly known as:  Husain Elks Take 1 Tab by mouth nightly. ZONALON, PRUDOXIN 5 % topical cream  
Commonly known as:  doxepin (ZONALON, PRUDOXIN) 5% cream  
Apply 2 grams to affected area 3 times daily for no more than 8 consecutive days Prescriptions Sent to Pharmacy Refills  ZONALON, PRUDOXIN (DOXEPIN, ZONALON, PRUDOXIN, 5% CREAM) 5 % topical cream 1  
 Sig: Apply 2 grams to affected area 3 times daily for no more than 8 consecutive days Class: Normal  
 Pharmacy: [address], [address] Ph #: [phone]  
 diclofenac (SOLARAZE) 3 % topical gel 1 Sig: Apply 2.5 g to affected area 2 times daily Class: Normal  
 Pharmacy: [address], [address] Ph #: [phone] We Performed the Following REFERRAL TO PODIATRY [REF90 Custom] Follow-up Instructions Return in about 1 month (around 6/21/2018). Referral Information Referral ID Referred By Referred To  
  
 [id number] Sally KYLE Not Available Visits Status Start Date End Date 1 New Request 5/21/18 5/21/19 If your referral has a status of pending review or denied, additional information will be sent to support the outcome of this decision. Patient Instructions Diabetes Foot Health: Care Instructions Your Care Instructions When you have diabetes, your feet need extra care and attention. Diabetes can damage the nerve endings and blood vessels in your feet, making you less likely to notice when your feet are injured. Diabetes also limits your body's ability to fight infection and get blood to areas that need it. If you get a minor foot injury, it could become an ulcer or a serious infection. With good foot care, you can prevent most of these problems. Caring for your feet can be quick and easy. Most of the care can be done when you are bathing or getting ready for bed. Follow-up care is a key part of your treatment and safety. Be sure to make and go to all appointments, and call your doctor if you are having problems. It's also a good idea to know your test results and keep a list of the medicines you take. How can you care for yourself at home?  
· Keep your blood sugar close to normal by watching what and how much you eat, monitoring blood sugar, taking medicines if prescribed, and getting regular exercise. · Do not smoke. Smoking affects blood flow and can make foot problems worse. If you need help quitting, talk to your doctor about stop-smoking programs and medicines. These can increase your chances of quitting for good. · Eat a diet that is low in fats. High fat intake can cause fat to build up in your blood vessels and decrease blood flow. · Inspect your feet daily for blisters, cuts, cracks, or sores. If you cannot see well, use a mirror or have someone help you. · Take care of your feet: 
[facility] AUTHORITY your feet every day. Use warm (not hot) water. Check the water temperature with your wrists or other part of your body, not your feet. ¨ Dry your feet well. Pat them dry. Do not rub the skin on your feet too hard. Dry well between your toes. If the skin on your feet stays moist, bacteria or a fungus can grow, which can lead to infection. ¨ Keep your skin soft. Use moisturizing skin cream to keep the skin on your feet soft and prevent calluses and cracks. But do not put the cream between your toes, and stop using any cream that causes a rash. ¨ Clean underneath your toenails carefully. Do not use a sharp object to clean underneath your toenails. Use the blunt end of a nail file or other rounded tool. ¨ Trim and file your toenails straight across to prevent ingrown toenails. Use a nail clipper, not scissors. Use an emery board to smooth the edges. · Change socks daily. Socks without seams are best, because seams often rub the feet. You can find socks for people with diabetes from specialty catalogs. · Look inside your shoes every day for things like gravel or torn linings, which could cause blisters or sores. · Buy shoes that fit well: 
¨ Look for shoes that have plenty of space around the toes. This helps prevent bunions and blisters. ¨ Try on shoes while wearing the kind of socks you will usually wear with the shoes. ¨ Avoid plastic shoes. They may rub your feet and cause blisters. Good shoes should be made of materials that are flexible and breathable, such as leather or cloth. ¨ Break in new shoes slowly by wearing them for no more than an hour a day for several days. Take extra time to check your feet for red areas, blisters, or other problems after you wear new shoes. · Do not go barefoot. Do not wear sandals, and do not wear shoes with very thin soles. Thin soles are easy to puncture. They also do not protect your feet from hot pavement or cold weather. · Have your doctor check your feet during each visit. If you have a foot problem, see your doctor. Do not try to treat an early foot problem at home. Home remedies or treatments that you can buy without a prescription (such as corn removers) can be harmful. · Always get early treatment for foot problems. A minor irritation can lead to a major problem if not properly cared for early. When should you call for help? Call your doctor now or seek immediate medical care if: 
? · You have a foot sore, an ulcer or break in the skin that is not healing after 4 days, bleeding corns or calluses, or an ingrown toenail. ? · You have blue or black areas, which can mean bruising or blood flow problems. ? · You have peeling skin or tiny blisters between your toes or cracking or oozing of the skin. ? · You have a fever for more than 24 hours and a foot sore. ? · You have new numbness or tingling in your feet that does not go away after you move your feet or change positions. ? · You have unexplained or unusual swelling of the foot or ankle. ? Watch closely for changes in your health, and be sure to contact your doctor if: 
? · You cannot do proper foot care. Where can you learn more? Go to http://jace-katherine.info/. Enter A739 in the search box to learn more about \"Diabetes Foot Health: Care Instructions. \" Current as of: March 13, 2017 Content Version: 11.4 © 7507-3710 Healthwise, GROU.PS. Care instructions adapted under license by [employer] (which disclaims liability or warranty for this information). If you have questions about a medical condition or this instruction, always ask your healthcare professional. [address] any warranty or liability for your use of this information. Introducing hospitals & HEALTH SERVICES! [facility] introduces [employer] patient portal. Now you can access parts of your medical record, email your doctor's office, and request medication refills online. 1. In your internet browser, go to [email or web address] 2. Click on the First Time User? Click Here link in the Sign In box. You will see the New Member Sign Up page. 3. Enter your [employer] Access Code exactly as it appears below. You will not need to use this code after youve completed the sign-up process. If you do not sign up before the expiration date, you must request a new code. · [employer] Access Code: [email or web address] Expires: 7/16/2018 11:13 AM 
 
4. Enter the last four digits of your Social Security Number (xxxx) and Date of Birth (mm/dd/yyyy) as indicated and click Submit. You will be taken to the next sign-up page. 5. Create a [employer] ID. This will be your [employer] login ID and cannot be changed, so think of one that is secure and easy to remember. 6. Create a [employer] password. You can change your password at any time. 7. Enter your Password Reset Question and Answer. This can be used at a later time if you forget your password. 8. Enter your e-mail address. You will receive e-mail notification when new information is available in [address] 9. Click Sign Up. You can now view and download portions of your medical record. 10. Click the Download Summary menu link to download a portable copy of your medical information. If you have questions, please visit the Frequently Asked Questions section of the [employer] website. Remember, [employer] is NOT to be used for urgent needs. For medical emergencies, dial 911. Now available from your iPhone and Android! Please provide this summary of care documentation to your next provider. Your primary care clinician is listed as Lashay Johnson. If you have any questions after today's visit, please call [phone].

## 2018-05-22 LAB
TESTOST FREE SERPL-MCNC: 14.5 PG/ML (ref 7.2–24)
TESTOST SERPL-MCNC: 798 NG/DL (ref 264–916)

## 2018-06-09 DIAGNOSIS — E11.9 TYPE 2 DIABETES MELLITUS WITHOUT COMPLICATION, WITHOUT LONG-TERM CURRENT USE OF INSULIN (HCC): ICD-10-CM

## 2018-06-09 DIAGNOSIS — I10 ESSENTIAL HYPERTENSION: ICD-10-CM

## 2018-06-10 RX ORDER — LISINOPRIL AND HYDROCHLOROTHIAZIDE 10; 12.5 MG/1; MG/1
TABLET ORAL
Qty: 30 TAB | Refills: 1 | Status: SHIPPED | OUTPATIENT
Start: 2018-06-10 | End: 2018-08-03 | Stop reason: SDUPTHER

## 2018-06-10 RX ORDER — GLIMEPIRIDE 2 MG/1
TABLET ORAL
Qty: 30 TAB | Refills: 1 | Status: SHIPPED | OUTPATIENT
Start: 2018-06-10 | End: 2018-08-09 | Stop reason: SDUPTHER

## 2018-06-13 PROBLEM — I25.10 CAD (CORONARY ARTERY DISEASE): Status: ACTIVE | Noted: 2018-06-13

## 2018-06-18 ENCOUNTER — OFFICE VISIT (OUTPATIENT)
Dept: FAMILY MEDICINE CLINIC | Age: 58
End: 2018-06-18

## 2018-06-18 ENCOUNTER — HOSPITAL ENCOUNTER (OUTPATIENT)
Dept: LAB | Age: 58
Discharge: HOME | End: 2018-06-18
Payer: MEDICAID

## 2018-06-18 VITALS
BODY MASS INDEX: 29.2 KG/M2 | DIASTOLIC BLOOD PRESSURE: 87 MMHG | HEART RATE: 88 BPM | WEIGHT: 208.6 LBS | RESPIRATION RATE: 14 BRPM | OXYGEN SATURATION: 98 % | TEMPERATURE: 98.8 F | SYSTOLIC BLOOD PRESSURE: 151 MMHG | HEIGHT: 71 IN

## 2018-06-18 DIAGNOSIS — F10.10 ALCOHOL ABUSE, DAILY USE: ICD-10-CM

## 2018-06-18 DIAGNOSIS — F32.A DEPRESSION, UNSPECIFIED DEPRESSION TYPE: ICD-10-CM

## 2018-06-18 DIAGNOSIS — F10.10 ALCOHOL ABUSE, DAILY USE: Primary | ICD-10-CM

## 2018-06-18 LAB
ALBUMIN SERPL-MCNC: 3.9 G/DL (ref 3.4–5)
ALBUMIN/GLOB SERPL: 1 {RATIO} (ref 0.8–1.7)
ALP SERPL-CCNC: 126 U/L (ref 45–117)
ALT SERPL-CCNC: 74 U/L (ref 16–61)
AST SERPL-CCNC: 78 U/L (ref 15–37)
BASOPHILS # BLD: 0 K/UL (ref 0–0.06)
BASOPHILS NFR BLD: 1 % (ref 0–2)
BILIRUB DIRECT SERPL-MCNC: 0.3 MG/DL (ref 0–0.2)
BILIRUB SERPL-MCNC: 1 MG/DL (ref 0.2–1)
DIFFERENTIAL METHOD BLD: ABNORMAL
EOSINOPHIL # BLD: 0.1 K/UL (ref 0–0.4)
EOSINOPHIL NFR BLD: 1 % (ref 0–5)
ERYTHROCYTE [DISTWIDTH] IN BLOOD BY AUTOMATED COUNT: 15.8 % (ref 11.6–14.5)
GLOBULIN SER CALC-MCNC: 4.1 G/DL (ref 2–4)
HCT VFR BLD AUTO: 41.8 % (ref 36–48)
HGB BLD-MCNC: 14.6 G/DL (ref 13–16)
LYMPHOCYTES # BLD: 0.9 K/UL (ref 0.9–3.6)
LYMPHOCYTES NFR BLD: 14 % (ref 21–52)
MCH RBC QN AUTO: 31.3 PG (ref 24–34)
MCHC RBC AUTO-ENTMCNC: 34.9 G/DL (ref 31–37)
MCV RBC AUTO: 89.7 FL (ref 74–97)
MONOCYTES # BLD: 0.5 K/UL (ref 0.05–1.2)
MONOCYTES NFR BLD: 8 % (ref 3–10)
NEUTS SEG # BLD: 5 K/UL (ref 1.8–8)
NEUTS SEG NFR BLD: 76 % (ref 40–73)
PLATELET # BLD AUTO: 80 K/UL (ref 135–420)
PMV BLD AUTO: 9.8 FL (ref 9.2–11.8)
PROT SERPL-MCNC: 8 G/DL (ref 6.4–8.2)
RBC # BLD AUTO: 4.66 M/UL (ref 4.7–5.5)
WBC # BLD AUTO: 6.5 K/UL (ref 4.6–13.2)

## 2018-06-18 PROCEDURE — 36415 COLL VENOUS BLD VENIPUNCTURE: CPT | Performed by: FAMILY MEDICINE

## 2018-06-18 PROCEDURE — 80076 HEPATIC FUNCTION PANEL: CPT | Performed by: FAMILY MEDICINE

## 2018-06-18 PROCEDURE — 85025 COMPLETE CBC W/AUTO DIFF WBC: CPT | Performed by: FAMILY MEDICINE

## 2018-06-18 NOTE — MR AVS SNAPSHOT
Osvaldo Burgess [place] [address] [facility] [address] [facility] [address] [phone] 
[phone] Patient: Sergey Rivas MRN: [id number] :1960 Visit Information Date & Time Provider Department Dept. Phone Encounter #  
 2018 10:15 AM Carlos Yanez, [address] [place] [address] [phone] [id number] Follow-up Instructions Return in about 2 weeks (around 2018). Upcoming Health Maintenance Date Due Hepatitis C Screening 1961 LIPID PANEL Q1 1961 FOOT EXAM Q1 1971 MICROALBUMIN Q1 1971 EYE EXAM RETINAL OR DILATED Q1 1971 DTaP/Tdap/Td series (1 - Tdap) 1982 FOBT Q 1 YEAR AGE 50-75 2011 Pneumococcal 19-64 Highest Risk (2 of 3 - PCV13) 2014 Influenza Age 5 to Adult 2018 HEMOGLOBIN A1C Q6M 10/17/2018 Allergies as of 2018  Review Complete On: 2018 By: Sergio Funes LPN Severity Noted Reaction Type Reactions Aleve [Naproxen Sodium]  2011    Itching, Hives Naproxen  2014    Hives Current Immunizations  Reviewed on 3/14/2018 Name Date Influenza Vaccine 2015 12:00 AM, 10/15/2014 12:00 AM, 10/9/2013 12:00 AM  
 Influenza Vaccine (Quad) PF 2018 11:13 AM  
 Pneumococcal Polysaccharide (PPSV-23) 2013 12:00 AM  
  
 Not reviewed this visit You Were Diagnosed With   
  
 Codes Comments Alcohol abuse, daily use    -  Primary ICD-10-CM: F10.10 ICD-9-CM: 305.01 Depression, unspecified depression type     ICD-10-CM: F32.9 ICD-9-CM: 439 Vitals BP Pulse Temp Resp Height(growth percentile) Weight(growth percentile) 151/87 (BP 1 Location: Left arm, BP Patient Position: Sitting) 88 98.8 °F (37.1 °C) (Oral) 14 5' 11\" (1.803 m) 208 lb 9.6 oz (94.6 kg) SpO2 BMI Smoking Status 98% 29.09 kg/m2 Never Smoker Vitals History BMI and BSA Data  Body Mass Index Body Surface Area  
 29.09 kg/m 2 2.18 m 2  
  
  
 Preferred Pharmacy Pharmacy Name Phone [phone], [place] - [address] [facility] [phone] Your Updated Medication List  
  
   
This list is accurate as of 6/18/18 10:58 AM.  Always use your most recent med list.  
  
  
  
  
 diclofenac 3 % topical gel Commonly known as:  Percell Read Apply 2.5 g to affected area 2 times daily  
  
 ferrous sulfate 325 mg (65 mg iron) tablet  
take 1 tablet by mouth once daily  
  
 gabapentin 300 mg capsule Commonly known as:  NEURONTIN Take 1 Cap by mouth three (3) times daily. glimepiride 2 mg tablet Commonly known as:  AMARYL  
TAKE 1 TABLET BY MOUTH EVERY MORNING  
  
 glucose blood VI test strips strip Commonly known as:  ASCENSIA AUTODISC VI, ONE TOUCH ULTRA TEST VI  
Use as directed to test blood sugar twice a day  
  
 lisinopril-hydroCHLOROthiazide 10-12.5 mg per tablet Commonly known as:  PRINZIDE, ZESTORETIC  
take 1 tablet by mouth once daily  
  
 metFORMIN 1,000 mg tablet Commonly known as:  GLUCOPHAGE  
take 1 tablet by mouth twice a day with meals QUEtiapine 300 mg tablet Commonly known as:  SEROquel Take 1 Tab by mouth daily. traZODone 100 mg tablet Commonly known as:  Salinas Bibles Take 1 Tab by mouth nightly. ZONALON, PRUDOXIN 5 % topical cream  
Commonly known as:  doxepin (ZONALON, PRUDOXIN) 5% cream  
Apply 2 grams to affected area 3 times daily for no more than 8 consecutive days Follow-up Instructions Return in about 2 weeks (around 7/2/2018). Introducing [facility] & HEALTH SERVICES! Braydon Ybarra introduces [employer] patient portal. Now you can access parts of your medical record, email your doctor's office, and request medication refills online. 1. In your internet browser, go to [email or web address] 2. Click on the First Time User? Click Here link in the Sign In box. You will see the New Member Sign Up page. 3. Enter your [employer] Access Code exactly as it appears below. You will not need to use this code after youve completed the sign-up process. If you do not sign up before the expiration date, you must request a new code. · [employer] Access Code: [email or web address] Expires: 7/16/2018 11:13 AM 
 
4. Enter the last four digits of your Social Security Number (xxxx) and Date of Birth (mm/dd/yyyy) as indicated and click Submit. You will be taken to the next sign-up page. 5. Create a [employer] ID. This will be your [employer] login ID and cannot be changed, so think of one that is secure and easy to remember. 6. Create a [employer] password. You can change your password at any time. 7. Enter your Password Reset Question and Answer. This can be used at a later time if you forget your password. 8. Enter your e-mail address. You will receive e-mail notification when new information is available in [address] 9. Click Sign Up. You can now view and download portions of your medical record. 10. Click the Download Summary menu link to download a portable copy of your medical information. If you have questions, please visit the Frequently Asked Questions section of the [employer] website. Remember, [employer] is NOT to be used for urgent needs. For medical emergencies, dial 911. Now available from your iPhone and Android! Please provide this summary of care documentation to your next provider. Your primary care clinician is listed as Lashay Johnson. If you have any questions after today's visit, please call [phone].

## 2018-06-18 NOTE — PROGRESS NOTES
1. Have you been to the ER, urgent care clinic since your last visit? Hospitalized since your last visit? No    2. Have you seen or consulted any other health care providers outside of the [address] [place] [address] since your last visit? Include any pap smears or colon screening. No    Chief Complaint   Patient presents with    Follow-up     1 month f/u.      Visit Vitals    /87 (BP 1 Location: Left arm, BP Patient Position: Sitting)    Pulse 88    Temp 98.8 °F (37.1 °C) (Oral)    Resp 14    Ht 5' 11\" (1.803 m)    Wt 208 lb 9.6 oz (94.6 kg)    SpO2 98%    BMI 29.09 kg/m2

## 2018-06-18 NOTE — PROGRESS NOTES
[facility]    CC: F/U of chronic fatigue    HPI:     Chronic Fatigue:  - Got requested blood work  - Fatigue is unchanged from last visit  - Reports that he has been depressed  - Last saw psychiatry 8 - 9 months ago  - Does not want to see any therapist, psychologist, and psychiatrist  - Reports that medications do not help his depression  - Has been seeing a peer   - Admits that he has been drinking nightly for that past two months (Previously reported that he was not drinking). - States that he drinks 6-8 cans of beer a night  - Reports that he does not care if drinking has been affecting his health (Suspected cause of his pancytopenia)  - Does not want to go to [facility], as he feels that they are useless  - Working on finding a new place to live and feels that a new place will help his mood      ROS: Positive items marked in RED  CON: fever, chills  Cardiovascular: palpitations, CP  Resp: cough, SOB  GI: nausea, vomiting, diarrhea  : dysuria, hematuria      Past Medical History:   Diagnosis Date    CAD (coronary artery disease)     \"cardiac stent placed 1 year ago\"    Depression     Diabetes ([facility] [address]     Hypertension     Substance abuse     Suicidal thoughts        Past Surgical History:   Procedure Laterality Date    HX BACK SURGERY  2014    HX CORONARY STENT PLACEMENT  2012    No stent placed       Family History   Problem Relation Age of Onset    Suicide Father     Diabetes Brother     Psychiatric Disorder Other        Social History     Social History    Marital status:      Spouse name: N/A    Number of children: N/A    Years of education: N/A     Social History Main Topics    Smoking status: Never Smoker    Smokeless tobacco: Never Used    Alcohol use No    Drug use: No    Sexual activity: Not Currently     Other Topics Concern    None     Social History Narrative     X 2. Lives with wife and 21year old son.   6year old son was taken away by . Unemployed        Allergies   Allergen Reactions    Aleve [Naproxen Sodium] Itching and Hives    Naproxen Hives       Current Outpatient Prescriptions:     lisinopril-hydroCHLOROthiazide (PRINZIDE, ZESTORETIC) 10-12.5 mg per tablet, take 1 tablet by mouth once daily, Disp: 30 Tab, Rfl: 1    glimepiride (AMARYL) 2 mg tablet, TAKE 1 TABLET BY MOUTH EVERY MORNING, Disp: 30 Tab, Rfl: 1    ZONALON, PRUDOXIN (DOXEPIN, ZONALON, PRUDOXIN, 5% CREAM) 5 % topical cream, Apply 2 grams to affected area 3 times daily for no more than 8 consecutive days, Disp: 90 g, Rfl: 1    diclofenac (SOLARAZE) 3 % topical gel, Apply 2.5 g to affected area 2 times daily, Disp: 300 g, Rfl: 1    glucose blood VI test strips (ASCENSIA AUTODISC VI, ONE TOUCH ULTRA TEST VI) strip, Use as directed to test blood sugar twice a day, Disp: 100 Strip, Rfl: 6    ferrous sulfate 325 mg (65 mg iron) tablet, take 1 tablet by mouth once daily, Disp: 30 Tab, Rfl: 0    QUEtiapine (SEROQUEL) 300 mg tablet, Take 1 Tab by mouth daily. , Disp: 90 Tab, Rfl: 1    gabapentin (NEURONTIN) 300 mg capsule, Take 1 Cap by mouth three (3) times daily. , Disp: 270 Cap, Rfl: 0    metFORMIN (GLUCOPHAGE) 1,000 mg tablet, take 1 tablet by mouth twice a day with meals, Disp: 180 Tab, Rfl: 0    traZODone (DESYREL) 100 mg tablet, Take 1 Tab by mouth nightly., Disp: 90 Tab, Rfl: 0    Physical Exam:      /87 (BP 1 Location: Left arm, BP Patient Position: Sitting)  Pulse 88  Temp 98.8 °F (37.1 °C) (Oral)   Resp 14  Ht 5' 11\" (1.803 m)  Wt 208 lb 9.6 oz (94.6 kg)  SpO2 98%  BMI 29.09 kg/m2    General:  Obese/over weight habitus, NAD  Eyes: sclera clear bilaterally, no discharge noted, eyelids normal in appearance  HENT: NCAT  Lungs: CTAB, Normal respiratory effort and rate  CV: irregularly irregular, no MRGs  ABD: soft, non-tender, slightly distended, normal bowel sounds, easily reducible non tender umbilical hernia  Ext: no peripheral edema or digital cyanosis noted  Skin: normal temperature, turgor, color, and texture  Psych: alert and oriented to person, place and time, slightly flat affect, became tearful during encounter  Neuro: Speech normal, Moving all extremities, gait normal    Component Results      Component Value Flag Ref Range Units Status     Testosterone 798  264 - 916 ng/dL Final     Comment:     (NOTE)   Adult male reference interval is based on a population of   healthy nonobese males (BMI <30) between 23and 44years old. [address] [place] [address], [address] [place], [place] [address],411;[id number]. PMID: [id number].    Performed At: [address] [id number]   Tonio Mota MD [id number]        Free testosterone (Direct) 14.5  7.2 - 24.0 pg/mL Final     Comment:       Assessment/Plan     Alcohol Abuse, Failing To Improve:  - Likely cause of pancytopenia  - Anticipate that it will not resolve, given his continued alcohol abuse  - Advise patient to stop drinking and to seek help  - Will check CBC and liver function panel  - Follow up in 2 weeks      Depression, Inadequately Controlled:  - Likely cause of chronic fatigue  - Patient advised to consider seeking help  - Handout given on list of local mental health providers   - Follow up in 2 weeks      Yoandy Hedrick MD  6/18/2018, 10:31 AM

## 2018-06-20 NOTE — PROGRESS NOTES
Please let patient know that his liver enzymes are elevated (They have almost tripled from the last time they were checked). I recommend that he stop drinking. He no longer has anemia or low white blood cells. His platelets are still somewhat low.

## 2018-06-21 ENCOUNTER — TELEPHONE (OUTPATIENT)
Dept: FAMILY MEDICINE CLINIC | Age: 58
End: 2018-06-21

## 2018-06-21 NOTE — TELEPHONE ENCOUNTER
----- Message from Velvet Mendoza MD sent at 6/20/2018  2:38 PM EDT -----  Please let patient know that his liver enzymes are elevated (They have almost tripled from the last time they were checked). I recommend that he stop drinking. He no longer has anemia or low white blood cells. His platelets are still somewhat low.

## 2018-06-21 NOTE — TELEPHONE ENCOUNTER
Spoke with Mr. Leora Alvarado regarding recent lab results. Patient made aware of results and that per Dr Collado Odor instructions to stop drinking. Patient expressed understanding.

## 2018-07-03 ENCOUNTER — OFFICE VISIT (OUTPATIENT)
Dept: FAMILY MEDICINE CLINIC | Age: 58
End: 2018-07-03

## 2018-07-03 VITALS
SYSTOLIC BLOOD PRESSURE: 115 MMHG | HEIGHT: 71 IN | WEIGHT: 216.4 LBS | BODY MASS INDEX: 30.3 KG/M2 | HEART RATE: 109 BPM | DIASTOLIC BLOOD PRESSURE: 73 MMHG | RESPIRATION RATE: 18 BRPM | TEMPERATURE: 97.8 F | OXYGEN SATURATION: 99 %

## 2018-07-03 DIAGNOSIS — I48.91 ATRIAL FIBRILLATION, UNSPECIFIED TYPE (HCC): ICD-10-CM

## 2018-07-03 DIAGNOSIS — R55 SYNCOPE, UNSPECIFIED SYNCOPE TYPE: Primary | ICD-10-CM

## 2018-07-03 DIAGNOSIS — R00.0 TACHYCARDIA: ICD-10-CM

## 2018-07-03 NOTE — MR AVS SNAPSHOT
Osvaldo Burgess [place] [address] [facility] [phone] [id number] 
[phone] Patient: Rhett oJhnston MRN: [id number] :1960 Visit Information Date & Time Provider Department Dept. Phone Encounter #  
 7/3/2018  3:00 PM Valentino Cyphers, [address] [facility] [phone] [id number] Follow-up Instructions Return if symptoms worsen or fail to improve. Your Appointments 2018  8:30 AM  
Follow Up with Valentino Cyphers, MD [address] [facility] Appt Note: 2 wk f/u; r/s  
 [facility] [address]  
  
   
 [address] [facility] Upcoming Health Maintenance Date Due Hepatitis C Screening 1960 LIPID PANEL Q1 1961 FOOT EXAM Q1 1971 MICROALBUMIN Q1 1971 EYE EXAM RETINAL OR DILATED Q1 1971 DTaP/Tdap/Td series (1 - Tdap) 1982 FOBT Q 1 YEAR AGE 50-75 2011 Pneumococcal 19-64 Highest Risk (2 of 3 - PCV13) 2014 Influenza Age 5 to Adult 2018 HEMOGLOBIN A1C Q6M 10/17/2018 Allergies as of 7/3/2018  Review Complete On: 7/3/2018 By: Jeff Garnett LPN Severity Noted Reaction Type Reactions Aleve [Naproxen Sodium]  2011    Itching, Hives Naproxen  2014    Hives Current Immunizations  Reviewed on 3/14/2018 Name Date Influenza Vaccine 2015 12:00 AM, 10/15/2014 12:00 AM, 10/9/2013 12:00 AM  
 Influenza Vaccine (Quad) PF 2018 11:13 AM  
 Pneumococcal Polysaccharide (PPSV-23) 2013 12:00 AM  
  
 Not reviewed this visit You Were Diagnosed With   
  
 Codes Comments Syncope, unspecified syncope type    -  Primary ICD-10-CM: R55 
ICD-9-CM: 780. 2 Atrial fibrillation, unspecified type ([facility] [address]     ICD-10-CM: I48.91 
ICD-9-CM: 427.31 Tachycardia     ICD-10-CM: R00.0 ICD-9-CM: 028. 0 Vitals BP Pulse Temp Resp Height(growth percentile) Weight(growth percentile) 115/73 (BP 1 Location: Left arm, BP Patient Position: Sitting) (!) 109 97.8 °F (36.6 °C) (Oral) 18 5' 11\" (1.803 m) 216 lb 6.4 oz (98.2 kg) SpO2 BMI Smoking Status 99% 30.18 kg/m2 Never Smoker Vitals History BMI and BSA Data Body Mass Index Body Surface Area  
 30.18 kg/m 2 2.22 m 2 Preferred Pharmacy Pharmacy Name Phone [id number] [address], [address] [phone] Your Updated Medication List  
  
   
This list is accurate as of 7/3/18  4:02 PM.  Always use your most recent med list.  
  
  
  
  
 diclofenac 3 % topical gel Commonly known as:  Deana Boxer Apply 2.5 g to affected area 2 times daily  
  
 ferrous sulfate 325 mg (65 mg iron) tablet  
take 1 tablet by mouth once daily  
  
 gabapentin 300 mg capsule Commonly known as:  NEURONTIN Take 1 Cap by mouth three (3) times daily. glimepiride 2 mg tablet Commonly known as:  AMARYL  
TAKE 1 TABLET BY MOUTH EVERY MORNING  
  
 glucose blood VI test strips strip Commonly known as:  ASCENSIA AUTODISC VI, ONE TOUCH ULTRA TEST VI  
Use as directed to test blood sugar twice a day  
  
 lisinopril-hydroCHLOROthiazide 10-12.5 mg per tablet Commonly known as:  PRINZIDE, ZESTORETIC  
take 1 tablet by mouth once daily  
  
 metFORMIN 1,000 mg tablet Commonly known as:  GLUCOPHAGE  
take 1 tablet by mouth twice a day with meals QUEtiapine 300 mg tablet Commonly known as:  SEROquel Take 1 Tab by mouth daily. traZODone 100 mg tablet Commonly known as:  Florentino [place] Take 1 Tab by mouth nightly. ZONALON, PRUDOXIN 5 % topical cream  
Commonly known as:  doxepin (ZONALON, PRUDOXIN) 5% cream  
Apply 2 grams to affected area 3 times daily for no more than 8 consecutive days We Performed the Following AMB POC EKG ROUTINE W/ 12 LEADS, INTER & REP [56903 CPT(R)] Follow-up Instructions Return if symptoms worsen or fail to improve. Introducing [facility]! [employer] introduces ticketea patient portal. Now you can access parts of your medical record, email your doctor's office, and request medication refills online. 1. In your internet browser, go to [email or web address] 2. Click on the First Time User? Click Here link in the Sign In box. You will see the New Member Sign Up page. 3. Enter your ticketea Access Code exactly as it appears below. You will not need to use this code after youve completed the sign-up process. If you do not sign up before the expiration date, you must request a new code. · ticketea Access Code: [email or web address] Expires: 7/16/2018 11:13 AM 
 
4. Enter the last four digits of your Social Security Number (xxxx) and Date of Birth (mm/dd/yyyy) as indicated and click Submit. You will be taken to the next sign-up page. 5. Create a ticketea ID. This will be your ticketea login ID and cannot be changed, so think of one that is secure and easy to remember. 6. Create a ticketea password. You can change your password at any time. 7. Enter your Password Reset Question and Answer. This can be used at a later time if you forget your password. 8. Enter your e-mail address. You will receive e-mail notification when new information is available in [address] 9. Click Sign Up. You can now view and download portions of your medical record. 10. Click the Download Summary menu link to download a portable copy of your medical information. If you have questions, please visit the Frequently Asked Questions section of the ticketea website. Remember, ticketea is NOT to be used for urgent needs. For medical emergencies, dial 911. Now available from your iPhone and Android! Please provide this summary of care documentation to your next provider. Your primary care clinician is listed as Rahcel Dunbar. If you have any questions after today's visit, please call [phone].

## 2018-07-03 NOTE — PROGRESS NOTES
Chief Complaint   Patient presents with    Chest Pain     Patient stated that his heart rate is elevated. Chest pain began a week ago. Describes pain in chest as \"sharp pain that comes and goes\". Patient fell in his home yesterday on 7/2/18 after taking Seroquel. 1. Have you been to the ER, urgent care clinic since your last visit? Hospitalized since your last visit? No    2. Have you seen or consulted any other health care providers outside of the [facility] since your last visit? Include any pap smears or colon screening.  No     Visit Vitals    /73 (BP 1 Location: Left arm, BP Patient Position: Sitting)    Pulse (!) 109    Temp 97.8 °F (36.6 °C) (Oral)    Resp 18    Ht 5' 11\" (1.803 m)    Wt 216 lb 6.4 oz (98.2 kg)    SpO2 99%    BMI 30.18 kg/m2

## 2018-07-03 NOTE — PROGRESS NOTES
Jovana Bundy    CC: Fast heart rate    HPI:     - Reports sudden LOC yesterday  - Went to pharmacy for evaluation today  - States HR was 109 and he was advised to get seen by his PCP  - Reports he had some issues w/ episodes of left sided CP several days ago. Pain came on suddenly, was sharp, and was not associated with exertion. Duration of several minutes. ROS: Positive items marked in RED  CON: fever, chills  Cardiovascular: LE swelling, palpitations, CP (See HPI)  Resp: cough, SOB  GI: nausea, vomiting, diarrhea  : dysuria, hematuria      Past Medical History:   Diagnosis Date    CAD (coronary artery disease)     \"cardiac stent placed 1 year ago\"    Depression     Diabetes ([facility] [address]     Hypertension     Substance abuse     Suicidal thoughts        Past Surgical History:   Procedure Laterality Date    HX BACK SURGERY  2014    HX CORONARY STENT PLACEMENT  2012    No stent placed       Family History   Problem Relation Age of Onset    Suicide Father     Diabetes Brother     Psychiatric Disorder Other        Social History     Social History    Marital status:      Spouse name: N/A    Number of children: N/A    Years of education: N/A     Social History Main Topics    Smoking status: Never Smoker    Smokeless tobacco: Never Used    Alcohol use No    Drug use: No    Sexual activity: Not Currently     Other Topics Concern    None     Social History Narrative     X 2. Lives with wife and 21year old son. 6year old son was taken away by .     Unemployed        Allergies   Allergen Reactions    Aleve [Naproxen Sodium] Itching and Hives    Naproxen Hives         Current Outpatient Prescriptions:     lisinopril-hydroCHLOROthiazide (PRINZIDE, ZESTORETIC) 10-12.5 mg per tablet, take 1 tablet by mouth once daily, Disp: 30 Tab, Rfl: 1    glimepiride (AMARYL) 2 mg tablet, TAKE 1 TABLET BY MOUTH EVERY MORNING, Disp: 30 Tab, Rfl: 1    [place] (DOXEPIN, ZONALON, PRUDOXIN, 5% CREAM) 5 % topical cream, Apply 2 grams to affected area 3 times daily for no more than 8 consecutive days, Disp: 90 g, Rfl: 1    diclofenac (SOLARAZE) 3 % topical gel, Apply 2.5 g to affected area 2 times daily, Disp: 300 g, Rfl: 1    glucose blood VI test strips (ASCENSIA AUTODISC VI, ONE TOUCH ULTRA TEST VI) strip, Use as directed to test blood sugar twice a day, Disp: 100 Strip, Rfl: 6    ferrous sulfate 325 mg (65 mg iron) tablet, take 1 tablet by mouth once daily, Disp: 30 Tab, Rfl: 0    QUEtiapine (SEROQUEL) 300 mg tablet, Take 1 Tab by mouth daily. , Disp: 90 Tab, Rfl: 1    gabapentin (NEURONTIN) 300 mg capsule, Take 1 Cap by mouth three (3) times daily. , Disp: 270 Cap, Rfl: 0    metFORMIN (GLUCOPHAGE) 1,000 mg tablet, take 1 tablet by mouth twice a day with meals, Disp: 180 Tab, Rfl: 0    traZODone (DESYREL) 100 mg tablet, Take 1 Tab by mouth nightly., Disp: 90 Tab, Rfl: 0    Physical Exam:      /73 (BP 1 Location: Left arm, BP Patient Position: Sitting)  Pulse (!) 109  Temp 97.8 °F (36.6 °C) (Oral)   Resp 18  Ht 5' 11\" (1.803 m)  Wt 216 lb 6.4 oz (98.2 kg)  SpO2 99%  BMI 30.18 kg/m2    General:  WD, WN, NAD  Eyes: sclera clear bilaterally, no discharge noted, eyelids normal in appearance  HENT: NCAT  Lungs: CTAB, Normal respiratory effort and rate  CV: irregularly irregular, no MRGs  ABD: soft, non-tender, non-distended, normal bowel sounds  Ext: no peripheral edema or digital cyanosis noted  Skin: normal temperature, turgor, color, and texture  Psych: alert and oriented to person, place and time, normal affect  Neuro: Speech normal, moving all extremities, gait normal    EKG (7/3/2018): Atrial Fibrillation.      Assessment/Plan     Syncope:  - Etiology unclear, but cardiac cause is high on differential  - Patient advised to get evaluated in ED      New Onset Atrial Fibrillation:  - Concern for possible MI trigger, given history  - Patient advised to get evaluated in ED        Faraz Lopez MD  7/3/2018, 3:54 PM

## 2018-07-04 PROBLEM — I48.91 ATRIAL FIBRILLATION (HCC): Status: ACTIVE | Noted: 2018-07-04

## 2018-07-09 ENCOUNTER — OFFICE VISIT (OUTPATIENT)
Dept: FAMILY MEDICINE CLINIC | Age: 58
End: 2018-07-09

## 2018-07-09 VITALS
BODY MASS INDEX: 29.99 KG/M2 | HEIGHT: 71 IN | TEMPERATURE: 98.2 F | OXYGEN SATURATION: 96 % | RESPIRATION RATE: 16 BRPM | HEART RATE: 64 BPM | WEIGHT: 214.2 LBS | SYSTOLIC BLOOD PRESSURE: 106 MMHG | DIASTOLIC BLOOD PRESSURE: 73 MMHG

## 2018-07-09 DIAGNOSIS — M54.12 CERVICAL RADICULOPATHY: ICD-10-CM

## 2018-07-09 DIAGNOSIS — I48.19 PERSISTENT ATRIAL FIBRILLATION (HCC): Primary | ICD-10-CM

## 2018-07-09 DIAGNOSIS — G89.29 CHRONIC MIDLINE LOW BACK PAIN WITHOUT SCIATICA: ICD-10-CM

## 2018-07-09 DIAGNOSIS — M54.50 CHRONIC MIDLINE LOW BACK PAIN WITHOUT SCIATICA: ICD-10-CM

## 2018-07-09 DIAGNOSIS — G89.4 CHRONIC PAIN SYNDROME: ICD-10-CM

## 2018-07-09 DIAGNOSIS — F32.A DEPRESSION, UNSPECIFIED DEPRESSION TYPE: ICD-10-CM

## 2018-07-09 DIAGNOSIS — D61.818 PANCYTOPENIA (HCC): ICD-10-CM

## 2018-07-09 PROBLEM — I51.89 DIASTOLIC DYSFUNCTION: Status: ACTIVE | Noted: 2018-07-09

## 2018-07-09 PROBLEM — I07.1 TRICUSPID REGURGITATION: Status: ACTIVE | Noted: 2018-07-09

## 2018-07-09 PROBLEM — I34.0 MITRAL REGURGITATION: Status: ACTIVE | Noted: 2018-07-09

## 2018-07-09 PROBLEM — F10.11 ALCOHOL ABUSE, IN REMISSION: Status: RESOLVED | Noted: 2017-01-04 | Resolved: 2018-07-09

## 2018-07-09 PROBLEM — F10.20 ALCOHOL DEPENDENCE (HCC): Status: ACTIVE | Noted: 2018-07-09

## 2018-07-09 PROBLEM — I51.7 MILD CONCENTRIC LEFT VENTRICULAR HYPERTROPHY (LVH): Status: ACTIVE | Noted: 2018-07-09

## 2018-07-09 RX ORDER — MELOXICAM 15 MG/1
15 TABLET ORAL DAILY
Qty: 30 TAB | Refills: 6 | Status: SHIPPED | OUTPATIENT
Start: 2018-07-09 | End: 2018-08-03 | Stop reason: ALTCHOICE

## 2018-07-09 RX ORDER — LANOLIN ALCOHOL/MO/W.PET/CERES
CREAM (GRAM) TOPICAL
Qty: 30 TAB | Refills: 0 | Status: SHIPPED | OUTPATIENT
Start: 2018-07-09 | End: 2018-08-29 | Stop reason: SDUPTHER

## 2018-07-09 RX ORDER — GABAPENTIN 400 MG/1
400 CAPSULE ORAL 3 TIMES DAILY
Qty: 90 CAP | Refills: 3 | Status: SHIPPED | OUTPATIENT
Start: 2018-07-09 | End: 2018-11-13 | Stop reason: SDUPTHER

## 2018-07-09 RX ORDER — QUETIAPINE FUMARATE 400 MG/1
400 TABLET, FILM COATED ORAL
Qty: 90 TAB | Refills: 1 | Status: SHIPPED | OUTPATIENT
Start: 2018-07-09 | End: 2018-09-05 | Stop reason: ALTCHOICE

## 2018-07-09 NOTE — PROGRESS NOTES
Chief Complaint   Patient presents with   [facility] Follow Up     [facility] TERENCE yamile'mathieu 7/5/2018     1. Have you been to the ER, urgent care clinic since your last visit? Hospitalized since your last visit? Yes Where: [facility]     2. Have you seen or consulted any other health care providers outside of the [facility] since your last visit? Include any pap smears or colon screening.  No

## 2018-07-09 NOTE — PATIENT INSTRUCTIONS
Deciding Between Electrical Cardioversion and Rate Control Medicines for Atrial Fibrillation  Deciding Between Electrical Cardioversion and Rate Control Medicines for Atrial Fibrillation    What is atrial fibrillation? Atrial fibrillation (say \"KELLY-tree-jocelin orl-zldd-HQB-shun\") is a kind of uneven heartbeat. It can make you feel lightheaded and dizzy. You may feel weak. It also can make you more likely to have a stroke. Electrical cardioversion can return your heart to a normal rhythm. First you'll get medicines to make you sleepy and control pain. Then your doctor will use patches to send an electric current to your heart. This resets the rhythm of your heart. Not everyone with atrial fibrillation needs this treatment. For some people, taking medicines may be better. Most people can live with an uneven heartbeat. It just has to be kept under control so the heart does not beat too fast.  Use this information to help you and your doctor decide which treatment to choose for atrial fibrillation. What are euceda points about this decision? · Electrical cardioversion can return your heart to a normal rhythm. But the problem can come back. The longer you have had atrial fibrillation, the more likely it is to come back after this treatment. · Cardioversion may not work as well when an uneven heartbeat is caused by another heart disease, such as heart failure. · If your symptoms bother you a lot, you may want to try cardioversion. But even if it works, you may still need to take blood thinners to prevent a stroke. · If you don't have symptoms, or if they don't bother you much, you can try medicines to slow your heart rate. And you can take blood thinners to prevent a stroke. · Cardioversion does have risks, such as stroke. Discuss the risks with your doctor. Make sure you understand them. · You may have more than one heart problem. Cardioversion doesn't work as well if you have more than one heart problem.   Why might you choose electrical cardioversion? · It restores the normal heart rhythm for most people. · The idea of having an electric shock does not bother you. · Your symptoms bother you a lot. · You have had atrial fibrillation just one time. · You do not have other heart problems. · You may not have to take as many medicines. Or you may not need to take them as long. Why might you choose rate-control medicines? · These medicines keep many people from having symptoms. · You prefer to take medicines rather than have an electric shock. · Your symptoms don't bother you much. · If these medicines don't work, you can still try electrical cardioversion. Your decision  Thinking about the facts and your feelings can help you make a decision that is right for you. Be sure you understand the benefits and risks of your options. And think about what else you need to do before you make the decision. Where can you learn more? Go to http://jace-katherine.info/. Enter O642 in the search box to learn more about \"Deciding Between Electrical Cardioversion and Rate Control Medicines for Atrial Fibrillation. \"  Current as of: September 21, 2016  Content Version: 11.4  © 3282-4950 Healthwise, [employer]. Care instructions adapted under license by [employer] (which disclaims liability or warranty for this information). If you have questions about a medical condition or this instruction, always ask your healthcare professional. [address] any warranty or liability for your use of this information.

## 2018-07-09 NOTE — MR AVS SNAPSHOT
Osvaldo Burgess [place] [address] [facility] [address] [address] 
[phone] Patient: Vesta Scheuermann MRN: [id number] :1960 Visit Information Date & Time Provider Department Dept. Phone Encounter #  
 2018  2:00 PM Naldo Akbar, [address] [phone] [id number] Follow-up Instructions Return in about 1 month (around 2018). Your Appointments 2018  8:30 AM  
Follow Up with Naldo Akbar MD  
[facility] ([facility]) Appt Note: 2 wk f/u; r/s  
 [facility] [address] [facility] [address]  
  
   
 [address] [facility] Upcoming Health Maintenance Date Due Hepatitis C Screening 1961 LIPID PANEL Q1 1961 FOOT EXAM Q1 1971 MICROALBUMIN Q1 1971 EYE EXAM RETINAL OR DILATED Q1 1971 DTaP/Tdap/Td series (1 - Tdap) 1982 FOBT Q 1 YEAR AGE 50-75 2011 Pneumococcal 19-64 Highest Risk (2 of 3 - PCV13) 2014 Influenza Age 5 to Adult 2018 HEMOGLOBIN A1C Q6M 10/17/2018 Allergies as of 2018  Review Complete On: 2018 By: Carloz Skinner LPN Severity Noted Reaction Type Reactions Aleve [Naproxen Sodium]  2011    Itching, Hives Naproxen  2014    Hives Current Immunizations  Reviewed on 3/14/2018 Name Date Influenza Vaccine 2015 12:00 AM, 10/15/2014 12:00 AM, 10/9/2013 12:00 AM  
 Influenza Vaccine (Quad) PF 2018 11:13 AM  
 Pneumococcal Polysaccharide (PPSV-23) 2013 12:00 AM  
  
 Not reviewed this visit You Were Diagnosed With   
  
 Codes Comments Persistent atrial fibrillation (HCC)    -  Primary ICD-10-CM: I48.1 ICD-9-CM: 427.31 Chronic midline low back pain without sciatica     ICD-10-CM: M54.5, G89.29 ICD-9-CM: 724.2, 338.29 Cervical radiculopathy     ICD-10-CM: M54.12 
ICD-9-CM: 723.4 Vitals BP Pulse Temp Resp Height(growth percentile) Weight(growth percentile) 106/73 64 98.2 °F (36.8 °C) (Oral) 16 5' 11\" (1.803 m) 214 lb 3.2 oz (97.2 kg) SpO2 BMI Smoking Status 96% 29.87 kg/m2 Never Smoker Vitals History BMI and BSA Data Body Mass Index Body Surface Area  
 29.87 kg/m 2 2.21 m 2 Preferred Pharmacy Pharmacy Name Phone [facility] [address], [place] [address] [facility] [phone] Your Updated Medication List  
  
   
This list is accurate as of 7/9/18  2:48 PM.  Always use your most recent med list.  
  
  
  
  
 dilTIAZem  mg ER capsule Commonly known as:  CARDIZEM CD Take 1 Cap by mouth daily for 30 days. Indications: VENTRICULAR RATE CONTROL IN ATRIAL FIBRILLATION  
  
 ferrous sulfate 325 mg (65 mg iron) tablet  
take 1 tablet by mouth once daily  
  
 gabapentin 400 mg capsule Commonly known as:  NEURONTIN Take 1 Cap by mouth three (3) times daily. glimepiride 2 mg tablet Commonly known as:  AMARYL  
TAKE 1 TABLET BY MOUTH EVERY MORNING  
  
 glucose blood VI test strips strip Commonly known as:  ASCENSIA AUTODISC VI, ONE TOUCH ULTRA TEST VI  
Use as directed to test blood sugar twice a day  
  
 lisinopril-hydroCHLOROthiazide 10-12.5 mg per tablet Commonly known as:  PRINZIDE, ZESTORETIC  
take 1 tablet by mouth once daily  
  
 magnesium oxide 400 mg tablet Commonly known as:  MAG-OX Take 1 Tab by mouth daily. Indications: hypomagnesemia  
  
 meloxicam 15 mg tablet Commonly known as:  MOBIC Take 1 Tab by mouth daily. metFORMIN 1,000 mg tablet Commonly known as:  GLUCOPHAGE  
take 1 tablet by mouth twice a day with meals QUEtiapine 300 mg tablet Commonly known as:  SEROquel Take 1 Tab by mouth daily. traZODone 100 mg tablet Commonly known as:  Debbe Gunner Take 1 Tab by mouth nightly. Prescriptions Sent to Pharmacy Refills meloxicam (MOBIC) 15 mg tablet 6 Sig: Take 1 Tab by mouth daily. Class: Normal  
 Pharmacy: [facility] [address], [address] Ph #: [phone] Route: Oral  
 gabapentin (NEURONTIN) 400 mg capsule 3 Sig: Take 1 Cap by mouth three (3) times daily. Class: Normal  
 Pharmacy: [facility] [address], [address] Ph #: [phone] Route: Oral  
  
We Performed the Following REFERRAL TO CARDIOLOGY [PQN85 Custom] REFERRAL TO PAIN MANAGEMENT [LIT532 Custom] Follow-up Instructions Return in about 1 month (around 8/9/2018). Referral Information Referral ID Referred By Referred To  
  
 [id number] Butch Rick MD   
   [address] [place] [address] [facility] Phone: [phone] Fax: [phone] Visits Status Start Date End Date 1 New Request 7/9/18 7/9/19 If your referral has a status of pending review or denied, additional information will be sent to support the outcome of this decision. Referral ID Referred By Referred To  
 [id number] Lakhwinder Araiza MD  
   [address], [place] [address] Phone: [phone] Fax: [phone] Visits Status Start Date End Date 1 New Request 7/9/18 7/9/19 If your referral has a status of pending review or denied, additional information will be sent to support the outcome of this decision. Patient Instructions Deciding Between Electrical Cardioversion and Rate Control Medicines for Atrial Fibrillation Deciding Between Electrical Cardioversion and Rate Control Medicines for Atrial Fibrillation What is atrial fibrillation? Atrial fibrillation (say \"AY-tree-jocelin ssk-isgg-DSM-shun\") is a kind of uneven heartbeat. It can make you feel lightheaded and dizzy.  You may feel weak. It also can make you more likely to have a stroke. Electrical cardioversion can return your heart to a normal rhythm. First you'll get medicines to make you sleepy and control pain. Then your doctor will use patches to send an electric current to your heart. This resets the rhythm of your heart. Not everyone with atrial fibrillation needs this treatment. For some people, taking medicines may be better. Most people can live with an uneven heartbeat. It just has to be kept under control so the heart does not beat too fast. 
Use this information to help you and your doctor decide which treatment to choose for atrial fibrillation. What are euceda points about this decision? · Electrical cardioversion can return your heart to a normal rhythm. But the problem can come back. The longer you have had atrial fibrillation, the more likely it is to come back after this treatment. · Cardioversion may not work as well when an uneven heartbeat is caused by another heart disease, such as heart failure. · If your symptoms bother you a lot, you may want to try cardioversion. But even if it works, you may still need to take blood thinners to prevent a stroke. · If you don't have symptoms, or if they don't bother you much, you can try medicines to slow your heart rate. And you can take blood thinners to prevent a stroke. · Cardioversion does have risks, such as stroke. Discuss the risks with your doctor. Make sure you understand them. · You may have more than one heart problem. Cardioversion doesn't work as well if you have more than one heart problem. Why might you choose electrical cardioversion? · It restores the normal heart rhythm for most people. · The idea of having an electric shock does not bother you. · Your symptoms bother you a lot. · You have had atrial fibrillation just one time. · You do not have other heart problems. · You may not have to take as many medicines.  Or you may not need to take them as long. Why might you choose rate-control medicines? · These medicines keep many people from having symptoms. · You prefer to take medicines rather than have an electric shock. · Your symptoms don't bother you much. · If these medicines don't work, you can still try electrical cardioversion. Your decision Thinking about the facts and your feelings can help you make a decision that is right for you. Be sure you understand the benefits and risks of your options. And think about what else you need to do before you make the decision. Where can you learn more? Go to http://jace-katherine.info/. Enter V753 in the search box to learn more about \"Deciding Between Electrical Cardioversion and Rate Control Medicines for Atrial Fibrillation. \" Current as of: September 21, 2016 Content Version: 11.4 © 6859-5318 ethology. Care instructions adapted under license by [employer] (which disclaims liability or warranty for this information). If you have questions about a medical condition or this instruction, always ask your healthcare professional. [address] any warranty or liability for your use of this information. Introducing [facility] & HEALTH SERVICES! Kris Eason introduces Stemgent patient portal. Now you can access parts of your medical record, email your doctor's office, and request medication refills online. 1. In your internet browser, go to [email or web address] 2. Click on the First Time User? Click Here link in the Sign In box. You will see the New Member Sign Up page. 3. Enter your Stemgent Access Code exactly as it appears below. You will not need to use this code after youve completed the sign-up process. If you do not sign up before the expiration date, you must request a new code. · Stemgent Access Code: [email or web address] Expires: 7/16/2018 11:13 AM 
 
 4. Enter the last four digits of your Social Security Number (xxxx) and Date of Birth (mm/dd/yyyy) as indicated and click Submit. You will be taken to the next sign-up page. 5. Create a Jukedeck ID. This will be your Jukedeck login ID and cannot be changed, so think of one that is secure and easy to remember. 6. Create a Jukedeck password. You can change your password at any time. 7. Enter your Password Reset Question and Answer. This can be used at a later time if you forget your password. 8. Enter your e-mail address. You will receive e-mail notification when new information is available in [address] 9. Click Sign Up. You can now view and download portions of your medical record. 10. Click the Download Summary menu link to download a portable copy of your medical information. If you have questions, please visit the Frequently Asked Questions section of the Jukedeck website. Remember, Jukedeck is NOT to be used for urgent needs. For medical emergencies, dial 911. Now available from your iPhone and Android! Please provide this summary of care documentation to your next provider. Your primary care clinician is listed as Sierra Sinha. If you have any questions after today's visit, please call [phone].

## 2018-07-09 NOTE — PROGRESS NOTES
Ambrocio Bundy    CC: JEANCARLOS from [facility]    HPI:   Afib:  Liz Multani to ED as instructed at last visit  - Admitted to hospital on same day due to AFIB with RVR (7/3/2018)  - Put on diltiazem drip. - Troponin's negative  - HR controlled on drip  - He remained in Afib throughout admission  - Transitioned to PO diltiazem  - Patient reports his prior issues with fatigue and dizziness resolved with the diltiazem  - Discharged on 7/5/2018 on magnesium and diltiazem regimen  - Has been taking medications as prescribed  - Denies any side effects or issues with the medication  - Patient reports that he would like an electrical cardioversion      Depression  - Reports improvement in mood with diltiazem regimen. Has had more energy and been able to work more. - Taking Seroquel as prescribed  - Has had improvement in sleep with the medication  - Has some days that he feels dose is slightly low  - Requests that his regimen be increased       Chronic Pain:  - 2/2 cervical radiculopathy and arthritis of lumbar spine (Prior history of sciatica. Denies issues today)  - Taking gabapentin, but still having issues with burning in shoulder/neck.  Reports that he used to be on higher dose of gabapentin for his pain control.  - Requests tramadol for his lower back pain      ROS: Positive items marked in RED  CON: fever, chills, fatigue  Cardiovascular: palpitations, CP  Resp: cough, SOB  GI: nausea, vomiting, diarrhea  : dysuria, hematuria      Past Medical History:   Diagnosis Date    Alcohol abuse 8/22/2013    CAD (coronary artery disease)     \"cardiac stent placed 1 year ago\"    Depression     Diastolic dysfunction 9/3/2979    DMII (diabetes mellitus, type 2) ([facility] [address]     Hypertension     Mild concentric left ventricular hypertrophy (LVH) 7/9/2018    Mitral regurgitation 7/9/2018    Substance abuse     Suicidal thoughts     Tricuspid regurgitation 2/6/8624    Umbilical hernia        Past Surgical History:   Procedure Laterality Date    HX BACK SURGERY  2014    HX CORONARY STENT PLACEMENT  2012    No stent placed       Family History   Problem Relation Age of Onset    Suicide Father     Diabetes Brother     Psychiatric Disorder Other        Social History     Social History    Marital status:      Spouse name: N/A    Number of children: N/A    Years of education: N/A     Social History Main Topics    Smoking status: Never Smoker    Smokeless tobacco: Never Used    Alcohol use 37.8 oz/week     63 Cans of beer per week    Drug use: No    Sexual activity: Not Currently     Other Topics Concern    None     Social History Narrative     X 2. Lives with wife and 21year old son. 6year old son was taken away by . Unemployed        Allergies   Allergen Reactions    Aleve [Naproxen Sodium] Itching and Hives    Naproxen Hives         Current Outpatient Prescriptions:     dilTIAZem CD (CARDIZEM CD) 120 mg ER capsule, Take 1 Cap by mouth daily for 30 days. Indications: VENTRICULAR RATE CONTROL IN ATRIAL FIBRILLATION, Disp: 30 Cap, Rfl: 2    magnesium oxide (MAG-OX) 400 mg tablet, Take 1 Tab by mouth daily. Indications: hypomagnesemia, Disp: 30 Tab, Rfl: 2    lisinopril-hydroCHLOROthiazide (PRINZIDE, ZESTORETIC) 10-12.5 mg per tablet, take 1 tablet by mouth once daily, Disp: 30 Tab, Rfl: 1    glimepiride (AMARYL) 2 mg tablet, TAKE 1 TABLET BY MOUTH EVERY MORNING, Disp: 30 Tab, Rfl: 1    glucose blood VI test strips (ASCENSIA AUTODISC VI, ONE TOUCH ULTRA TEST VI) strip, Use as directed to test blood sugar twice a day, Disp: 100 Strip, Rfl: 6    ferrous sulfate 325 mg (65 mg iron) tablet, take 1 tablet by mouth once daily, Disp: 30 Tab, Rfl: 0    QUEtiapine (SEROQUEL) 300 mg tablet, Take 1 Tab by mouth daily. , Disp: 90 Tab, Rfl: 1    gabapentin (NEURONTIN) 300 mg capsule, Take 1 Cap by mouth three (3) times daily. , Disp: 270 Cap, Rfl: 0    metFORMIN (GLUCOPHAGE) 1,000 mg tablet, take 1 tablet by mouth twice a day with meals, Disp: 180 Tab, Rfl: 0    traZODone (DESYREL) 100 mg tablet, Take 1 Tab by mouth nightly., Disp: 90 Tab, Rfl: 0    Physical Exam:      /73  Pulse 64  Temp 98.2 °F (36.8 °C) (Oral)   Ht 5' 11\" (1.803 m)  Wt 214 lb 3.2 oz (97.2 kg)  SpO2 96%  BMI 29.87 kg/m2    General: obese habitus, NAD  Eyes: sclera clear bilaterally, no discharge noted, eyelids normal in appearance  HENT: NCAT   Lungs: CTAB, Normal respiratory effort and rate  CV: irregularly irregular, no MRGs  ABD: soft, non-tender, non-distended, normal bowel sounds  Ext: no peripheral edema or digital cyanosis noted  Skin: normal temperature, turgor, color, and texture  Psych: alert and oriented to person, place and time, normal affect  Neuro: Speech normal, Moving all extremities, gait normal    Results for Carson Spear (MRN [id number]):   Ref.  Range 7/5/2018 04:40   WBC Latest Ref Range: 4.0 - 11.0 1000/mm3 2.1 (L)   NRBC Latest Ref Range: 0 - 0   0   RBC Latest Ref Range: 3.80 - 5.70 M/uL 4.07   HGB Latest Ref Range: 12.4 - 17.2 gm/dl 12.9   HCT Latest Ref Range: 37.0 - 50.0 % 37.2   MCV Latest Ref Range: 80.0 - 98.0 fL 91.4   MCH Latest Ref Range: 23.0 - 34.6 pg 31.7   MCHC Latest Ref Range: 30.0 - 36.0 gm/dl 34.7   RDW-SD Latest Ref Range: 35.1 - 43.9   53.3 (H)   PLATELET Latest Ref Range: 140 - 450 1000/mm3 46 (LL)   MPV Latest Ref Range: 6.0 - 10.0 fL 8.9   NEUTROPHILS Latest Ref Range: 34 - 64 % 52.7   LYMPHOCYTES Latest Ref Range: 28 - 48 % 29.8   MONOCYTES Latest Ref Range: 1 - 13 % 14.6 (H)   EOSINOPHILS Latest Ref Range: 0 - 5 % 2.4   BASOPHILS Latest Ref Range: 0 - 3 % 0.5   IMMATURE GRANULOCYTES Latest Ref Range: 0.0 - 3.0 % 0.0   Sodium Latest Ref Range: 136 - 145 mEq/L 143   Potassium Latest Ref Range: 3.5 - 5.1 mEq/L 4.0   Chloride Latest Ref Range: 98 - 107 mEq/L 108 (H)   CO2 Latest Ref Range: 21 - 32 mEq/L 29   Anion gap Latest Ref Range: 5 - 15 mmol/L 5   Glucose Latest Ref Range: 74 - 106 mg/dl 138 (H)   BUN Latest Ref Range: 7 - 25 mg/dl 7   Creatinine Latest Ref Range: 0.6 - 1.3 mg/dl 0.7   Calcium Latest Ref Range: 8.5 - 10.1 mg/dl 8.3 (L)   Magnesium Latest Ref Range: 1.6 - 2.6 mg/dl 1.6   GFR est non-AA Latest Units:   >60   GFR est AA Latest Units:   >60     Echocardiogram (7/5/2018): Interpretation Summary  A complete two-dimensional transthoracic echocardiogram was performed (2D, M-  mode, Doppler and color flow Doppler). The study was technically adequate. Normal left ventricular size and systolic function with a calculated ejection  fraction of 54%. Mild concentric left ventricular hypertrophy. Grade I diastolic dysfunction. Mild mitral and tricuspid regurgitation. Estimated right ventricular systolic pressure is 27 mmHg. Prior study dated 1/17/12, documents an EF of 55%, mild LVH, mild TR and MR,  right heart enlargement, and RVSP of 45 mmHg.       Assessment/Plan     Persistent Afib:  - Rate well controlled  - Will avoid anticoagulation given thrombocytopenia  - Will continue current Cardizem regimen   - Will refer to cardiology for evaluation  - Handout given on Deciding Between Electrical Cardioversion and Rate Control Medicines for Atrial Fibrillation  - Follow up in one month      Depression, Improving:  - Will increase Seroquel dose  - Follow up in one month      Chronic Pain:  - Given alcohol abuse, will avoid any opiates  - Will avoid propionic acid class NSAIDS, given naproxen allergy   - Will try enolic acid class NSAID (Meloxicam)  - Will refer to pain management, per patient's request  - Will increase gabapentin dose for neuropathic pain  - Follow up in one month        Latricia Connor MD  7/9/2018, 2:20 PM

## 2018-07-13 ENCOUNTER — TELEPHONE (OUTPATIENT)
Dept: CARDIOLOGY CLINIC | Age: 58
End: 2018-07-13

## 2018-08-03 ENCOUNTER — OFFICE VISIT (OUTPATIENT)
Dept: CARDIOLOGY CLINIC | Age: 58
End: 2018-08-03

## 2018-08-03 VITALS
SYSTOLIC BLOOD PRESSURE: 136 MMHG | WEIGHT: 216 LBS | HEART RATE: 81 BPM | OXYGEN SATURATION: 98 % | DIASTOLIC BLOOD PRESSURE: 84 MMHG | HEIGHT: 71 IN | BODY MASS INDEX: 30.24 KG/M2

## 2018-08-03 DIAGNOSIS — I10 ESSENTIAL HYPERTENSION: ICD-10-CM

## 2018-08-03 DIAGNOSIS — Z82.49 FAMILY HISTORY OF PREMATURE CAD: ICD-10-CM

## 2018-08-03 DIAGNOSIS — I34.0 MITRAL VALVE INSUFFICIENCY, UNSPECIFIED ETIOLOGY: ICD-10-CM

## 2018-08-03 DIAGNOSIS — E11.42 TYPE 2 DIABETES MELLITUS WITH DIABETIC POLYNEUROPATHY, WITHOUT LONG-TERM CURRENT USE OF INSULIN (HCC): ICD-10-CM

## 2018-08-03 DIAGNOSIS — I51.7 MILD CONCENTRIC LEFT VENTRICULAR HYPERTROPHY (LVH): ICD-10-CM

## 2018-08-03 DIAGNOSIS — E11.9 TYPE 2 DIABETES MELLITUS WITHOUT COMPLICATION, WITHOUT LONG-TERM CURRENT USE OF INSULIN (HCC): ICD-10-CM

## 2018-08-03 DIAGNOSIS — I48.0 PAROXYSMAL ATRIAL FIBRILLATION (HCC): Primary | ICD-10-CM

## 2018-08-03 DIAGNOSIS — I07.1 TRICUSPID VALVE INSUFFICIENCY, UNSPECIFIED ETIOLOGY: ICD-10-CM

## 2018-08-03 RX ORDER — LISINOPRIL AND HYDROCHLOROTHIAZIDE 10; 12.5 MG/1; MG/1
TABLET ORAL
Qty: 30 TAB | Refills: 6 | Status: SHIPPED | OUTPATIENT
Start: 2018-08-03 | End: 2018-09-05 | Stop reason: SDUPTHER

## 2018-08-03 RX ORDER — SULFAMETHOXAZOLE AND TRIMETHOPRIM 800; 160 MG/1; MG/1
1 TABLET ORAL 2 TIMES DAILY
Qty: 10 TAB | Refills: 0 | Status: SHIPPED | OUTPATIENT
Start: 2018-08-03 | End: 2018-09-05

## 2018-08-03 RX ORDER — DILTIAZEM HYDROCHLORIDE 120 MG/1
120 CAPSULE, COATED, EXTENDED RELEASE ORAL DAILY
Qty: 30 CAP | Refills: 5 | Status: SHIPPED | OUTPATIENT
Start: 2018-08-03 | End: 2018-10-03 | Stop reason: SDUPTHER

## 2018-08-03 RX ORDER — ASPIRIN 81 MG/1
TABLET ORAL DAILY
COMMUNITY
End: 2020-12-14

## 2018-08-03 NOTE — MR AVS SNAPSHOT
[address] [facility] [address] [address] 
[phone] Patient: Reji Mcdowell MRN: [id number] :1960 Visit Information Date & Time Provider Department Dept. Phone Encounter #  
 8/3/2018 10:30 AM Cosmo Rudolph MD [address] [place] [address] Drive Specialist at [facility] [phone] [id number] Follow-up Instructions Return in about 3 months (around 11/3/2018). Your Appointments 2018  8:00 AM  
Follow Up with Toshia Ruiz MD  
[facility] ([facility] Appt Note: 1 mo f/u  
 [facility] [address]  
  
   
 [address] [facility] [address]  
  
    
 2018 11:00 AM  
Follow Up with Cosmo Rudolph MD  
[facility] Appt Note: 3 months follow up  
 [facility] [address] Upcoming Health Maintenance Date Due Hepatitis C Screening 1960 LIPID PANEL Q1 1961 FOOT EXAM Q1 1971 MICROALBUMIN Q1 1971 EYE EXAM RETINAL OR DILATED Q1 1971 DTaP/Tdap/Td series (1 - Tdap) 1982 FOBT Q 1 YEAR AGE 50-75 2011 Pneumococcal 19-64 Highest Risk (2 of 3 - PCV13) 2014 Influenza Age 5 to Adult 2018 HEMOGLOBIN A1C Q6M 10/17/2018 Allergies as of 8/3/2018  Review Complete On: 8/3/2018 By: Michelle Cohn LPN Severity Noted Reaction Type Reactions Aleve [Naproxen Sodium]  2011    Itching, Hives Naproxen  2014    Hives Current Immunizations  Reviewed on 3/14/2018 Name Date Influenza Vaccine 2015 12:00 AM, 10/15/2014 12:00 AM, 10/9/2013 12:00 AM  
 Influenza Vaccine (Quad) PF 2018 11:13 AM  
 Pneumococcal Polysaccharide (PPSV-23) 2013 12:00 AM  
  
 Not reviewed this visit You Were Diagnosed With   
  
 Codes Comments Paroxysmal atrial fibrillation (HCC)    -  Primary ICD-10-CM: I48.0 ICD-9-CM: 427.31 Essential hypertension     ICD-10-CM: I10 
ICD-9-CM: 401.9 Vitals BP Pulse Height(growth percentile) Weight(growth percentile) SpO2 BMI  
 136/84 (BP 1 Location: Left arm, BP Patient Position: Sitting) 81 5' 11\" (1.803 m) 216 lb (98 kg) 98% 30.13 kg/m2 Smoking Status Never Smoker Vitals History BMI and BSA Data Body Mass Index Body Surface Area  
 30.13 kg/m 2 2.22 m 2 Preferred Pharmacy Pharmacy Name Phone [facility] [address], [address] [phone] Your Updated Medication List  
  
   
This list is accurate as of 8/3/18 12:04 PM.  Always use your most recent med list.  
  
  
  
  
 aspirin delayed-release 81 mg tablet Take  by mouth daily. dilTIAZem  mg ER capsule Commonly known as:  CARDIZEM CD Take 1 Cap by mouth daily for 30 days. Indications: VENTRICULAR RATE CONTROL IN ATRIAL FIBRILLATION  
  
 ferrous sulfate 325 mg (65 mg iron) tablet  
take 1 tablet by mouth once daily  
  
 gabapentin 400 mg capsule Commonly known as:  NEURONTIN Take 1 Cap by mouth three (3) times daily. glimepiride 2 mg tablet Commonly known as:  AMARYL  
TAKE 1 TABLET BY MOUTH EVERY MORNING  
  
 glucose blood VI test strips strip Commonly known as:  ASCENSIA AUTODISC VI, ONE TOUCH ULTRA TEST VI  
Use as directed to test blood sugar twice a day IBUPROFEN PO Take  by mouth.  
  
 lisinopril-hydroCHLOROthiazide 10-12.5 mg per tablet Commonly known as:  PRINZIDE, ZESTORETIC  
take 1 tablet by mouth once daily  
  
 magnesium oxide 400 mg tablet Commonly known as:  MAG-OX Take 1 Tab by mouth daily. Indications: hypomagnesemia  
  
 metFORMIN 1,000 mg tablet Commonly known as:  GLUCOPHAGE  
take 1 tablet by mouth twice a day with meals QUEtiapine 400 mg tablet Commonly known as:  SEROquel Take 1 Tab by mouth nightly. We Performed the Following AMB POC EKG ROUTINE W/ 12 LEADS, INTER & REP [57883 CPT(R)] Follow-up Instructions Return in about 3 months (around 11/3/2018). Introducing [facility] & HEALTH SERVICES! [facility] introduces VoÃ¶lks patient portal. Now you can access parts of your medical record, email your doctor's office, and request medication refills online. 1. In your internet browser, go to [email or web address] 2. Click on the First Time User? Click Here link in the Sign In box. You will see the New Member Sign Up page. 3. Enter your VoÃ¶lks Access Code exactly as it appears below. You will not need to use this code after youve completed the sign-up process. If you do not sign up before the expiration date, you must request a new code. · VoÃ¶lks Access Code: [email or web address] Expires: 11/1/2018 12:03 PM 
 
4. Enter the last four digits of your Social Security Number (xxxx) and Date of Birth (mm/dd/yyyy) as indicated and click Submit. You will be taken to the next sign-up page. 5. Create a VoÃ¶lks ID. This will be your VoÃ¶lks login ID and cannot be changed, so think of one that is secure and easy to remember. 6. Create a VoÃ¶lks password. You can change your password at any time. 7. Enter your Password Reset Question and Answer. This can be used at a later time if you forget your password. 8. Enter your e-mail address. You will receive e-mail notification when new information is available in [address] 9. Click Sign Up. You can now view and download portions of your medical record. 10. Click the Download Summary menu link to download a portable copy of your medical information. If you have questions, please visit the Frequently Asked Questions section of the VoÃ¶lks website. Remember, VoÃ¶lks is NOT to be used for urgent needs. For medical emergencies, dial 911. Now available from your iPhone and Android! Please provide this summary of care documentation to your next provider. Your primary care clinician is listed as Sierra Sinha. If you have any questions after today's visit, please call [phone].

## 2018-08-03 NOTE — PROGRESS NOTES
1. Have you been to the ER, urgent care clinic since your last visit? Hospitalized since your last visit? NP _ [address] [facility]     2. Have you seen or consulted any other health care providers outside of the [facility] since your last visit? Include any pap smears or colon screening.   No

## 2018-08-05 RX ORDER — METFORMIN HYDROCHLORIDE 1000 MG/1
TABLET ORAL
Qty: 180 TAB | Refills: 0 | Status: SHIPPED | OUTPATIENT
Start: 2018-08-05 | End: 2018-11-11 | Stop reason: SDUPTHER

## 2018-08-09 DIAGNOSIS — E11.9 TYPE 2 DIABETES MELLITUS WITHOUT COMPLICATION, WITHOUT LONG-TERM CURRENT USE OF INSULIN (HCC): ICD-10-CM

## 2018-08-12 RX ORDER — GLIMEPIRIDE 2 MG/1
TABLET ORAL
Qty: 30 TAB | Refills: 1 | Status: SHIPPED | OUTPATIENT
Start: 2018-08-12 | End: 2018-10-03 | Stop reason: SDUPTHER

## 2018-08-17 PROBLEM — Z82.49 FAMILY HISTORY OF PREMATURE CAD: Status: ACTIVE | Noted: 2018-08-17

## 2018-08-17 NOTE — PROGRESS NOTES
Subjective:      Ernst Connolly is in the office today for cardiac evaluation. He is a 63 y.o. man that was recently diagnosed with atrial fibrillation. The patient had gone to a pharmacy and had his blood pressure checked and it was noted his heart rate was 110 beats per minute. He subsequently went  to the emergency department. He reported he was given Ativan and his heart rate actually increased initially. He had evaluation including echocardiography at that time. His ejection fraction was normal at 54%. There was mild tricuspid and mitral regurgitation. There was grade 1 diastolic dysfunction. That particular echocardiogram was done at [facility].     In the office today, the patient reports no shortness of breath. He can walk two blocks without stopping for dyspnea. He will experience some dyspnea with walking up a single flight of steps. He denies PND. He has had some palpitations from time to time. He also has had some leg swelling. He has intermittently been taking some of his wife's \"fluid pills. \"  He will take the fluid pills 4-5 times in a single day. He believes they are 20 mg Furosemide tablets. The patient has significant comorbid disease and alcohol dependence. He also has cervical radiculopathy for which he takes Gabapentin. Patient's cardiac risk factors are family history, diabetes mellitus, hypertension.         Patient Active Problem List    Diagnosis Date Noted    Family history of premature CAD 08/17/2018    Mild concentric left ventricular hypertrophy (LVH) 96/68/1336    Diastolic dysfunction 39/50/0576    Tricuspid regurgitation 07/09/2018    Mitral regurgitation 07/09/2018    Alcohol dependence ([address] 07/09/2018    Atrial fibrillation ([address] 07/04/2018    Tachycardia 07/03/2018    CAD (coronary artery disease) 06/13/2018    Pancytopenia ([address] 04/17/2018    Essential hypertension 04/17/2018    Type 2 diabetes mellitus without complication, without long-term current use of insulin ([facility] 04/17/2018    Depression 01/12/2018    Suicidal thoughts 01/12/2018    H/O lumbosacral spine surgery 01/04/2017    Chronic low back pain with sciatica 01/04/2017    H/O coronary angioplasty 01/04/2017    Anxiety and depression 08/22/2013    Alcohol abuse 08/22/2013     Current Outpatient Prescriptions   Medication Sig Dispense Refill    aspirin delayed-release 81 mg tablet Take  by mouth daily.  IBUPROFEN PO Take  by mouth.  trimethoprim-sulfamethoxazole (BACTRIM DS) 160-800 mg per tablet Take 1 Tab by mouth two (2) times a day. 10 Tab 0    dilTIAZem CD (CARDIZEM CD) 120 mg ER capsule Take 1 Cap by mouth daily. Indications: VENTRICULAR RATE CONTROL IN ATRIAL FIBRILLATION 30 Cap 5    lisinopril-hydroCHLOROthiazide (PRINZIDE, ZESTORETIC) 10-12.5 mg per tablet take 1 tablet by mouth once daily 30 Tab 6    gabapentin (NEURONTIN) 400 mg capsule Take 1 Cap by mouth three (3) times daily. 90 Cap 3    ferrous sulfate 325 mg (65 mg iron) tablet take 1 tablet by mouth once daily 30 Tab 0    QUEtiapine (SEROQUEL) 400 mg tablet Take 1 Tab by mouth nightly. 90 Tab 1    magnesium oxide (MAG-OX) 400 mg tablet Take 1 Tab by mouth daily.  Indications: hypomagnesemia 30 Tab 2    glucose blood VI test strips (ASCENSIA AUTODISC VI, ONE TOUCH ULTRA TEST VI) strip Use as directed to test blood sugar twice a day 100 Strip 6    glimepiride (AMARYL) 2 mg tablet take 1 tablet by mouth every morning 30 Tab 1    metFORMIN (GLUCOPHAGE) 1,000 mg tablet take 1 tablet by mouth twice a day with meals 180 Tab 0     Allergies   Allergen Reactions    Aleve [Naproxen Sodium] Itching and Hives    Naproxen Hives     Past Medical History:   Diagnosis Date    Alcohol abuse 8/22/2013    CAD (coronary artery disease)     \"cardiac stent placed 1 year ago\"    Depression     Diastolic dysfunction 9/0/6806    DMII [facility]di[facility] mellitus, type 2) ([facility])     Hypertension     Mild concentric left ventricular hypertrophy (LVH) 2018    Mitral regurgitation 2018    Substance abuse     Suicidal thoughts     Tricuspid regurgitation 7620    Umbilical hernia      Past Surgical History:   Procedure Laterality Date    HX BACK SURGERY      HX CORONARY STENT PLACEMENT      No stent placed     Family History   Problem Relation Age of Onset    Suicide Father     Diabetes Brother     Psychiatric Disorder Other      History   Smoking Status    Never Smoker   Smokeless Tobacco    Never Used          Review of Systems, additional:  Constitutional: negative  Eyes: negative  Respiratory: negative  Cardiovascular: positive for palpitations, dyspnea on exertion  Gastrointestinal: negative  Musculoskeletal:negative  Neurological: negative  Behvioral/Psych: negative  Endocrine: negative  ENT: negative    Objective:     Visit Vitals    /84 (BP 1 Location: Left arm, BP Patient Position: Sitting)    Pulse 81    Ht 5' 11\" (1.803 m)    Wt 216 lb (98 kg)    SpO2 98%    BMI 30.13 kg/m2     General:  alert, cooperative, no distress   Chest Wall: inspection normal - no chest wall deformities or tenderness, respiratory effort normal   Lung: clear to auscultation bilaterally   Heart:  irregularly irregular rhythm with rate 81   Abdomen: Distended , nontender. + fluid wave   Extremities: extremities normal, atraumatic, no cyanosis. There was 3+ leg edema Skin: erythematous pustular rash on trunk   Neuro: alert, oriented, normal speech, no focal findings or movement disorder noted     EK/3/2018; Atrial fibrillation. Diffuse nondiagnostic ST & T wave abnormalities. Assessment/Plan:       ICD-10-CM ICD-9-CM    1. Paroxysmal atrial fibrillation ([address] [id number] score 2, excessive risk for anticoagulants. Continue ASA. Continue Diltiazem. Will arrange for holter monitor at next visit. I48.0 427.31 AMB POC EKG ROUTINE W/ 12 LEADS, INTER & REP   2.  Essential hypertension, controlled in office today. I10 401.9 lisinopril-hydroCHLOROthiazide (PRINZIDE, ZESTORETIC) 10-12.5 mg per tablet   3. Mild concentric left ventricular hypertrophy (LVH) I51.7 429.3    4. Mitral valve insufficiency, unspecified etiology, mild I34.0 424.0    5. Tricuspid valve insufficiency, unspecified etiology, mild I07.1 397.0    6. Type 2 diabetes mellitus without complication, without long-term current use of insulin (HCC) E11.9 250.00    7.  Family history of premature CAD Z82.49 V17.3

## 2018-08-29 RX ORDER — FERROUS SULFATE 325(65) MG
TABLET, DELAYED RELEASE (ENTERIC COATED) ORAL
Qty: 30 TAB | Refills: 1 | Status: SHIPPED | OUTPATIENT
Start: 2018-08-29 | End: 2018-11-11 | Stop reason: SDUPTHER

## 2018-09-05 ENCOUNTER — OFFICE VISIT (OUTPATIENT)
Dept: FAMILY MEDICINE CLINIC | Age: 58
End: 2018-09-05

## 2018-09-05 VITALS
DIASTOLIC BLOOD PRESSURE: 86 MMHG | OXYGEN SATURATION: 100 % | BODY MASS INDEX: 31.44 KG/M2 | HEART RATE: 86 BPM | SYSTOLIC BLOOD PRESSURE: 140 MMHG | WEIGHT: 224.6 LBS | TEMPERATURE: 96.5 F | HEIGHT: 71 IN | RESPIRATION RATE: 18 BRPM

## 2018-09-05 DIAGNOSIS — I10 ESSENTIAL HYPERTENSION: ICD-10-CM

## 2018-09-05 DIAGNOSIS — E11.9 TYPE 2 DIABETES MELLITUS WITHOUT COMPLICATION, WITHOUT LONG-TERM CURRENT USE OF INSULIN (HCC): Primary | ICD-10-CM

## 2018-09-05 DIAGNOSIS — Z23 ENCOUNTER FOR IMMUNIZATION: ICD-10-CM

## 2018-09-05 DIAGNOSIS — F32.A DEPRESSION, UNSPECIFIED DEPRESSION TYPE: ICD-10-CM

## 2018-09-05 RX ORDER — LISINOPRIL AND HYDROCHLOROTHIAZIDE 10; 12.5 MG/1; MG/1
2 TABLET ORAL DAILY
Qty: 30 TAB | Refills: 6 | Status: SHIPPED | OUTPATIENT
Start: 2018-09-05 | End: 2018-10-03 | Stop reason: DRUGHIGH

## 2018-09-05 RX ORDER — AMITRIPTYLINE HYDROCHLORIDE 50 MG/1
50 TABLET, FILM COATED ORAL
Qty: 30 TAB | Refills: 3 | Status: SHIPPED | OUTPATIENT
Start: 2018-09-05 | End: 2018-10-03 | Stop reason: ALTCHOICE

## 2018-09-05 NOTE — MR AVS SNAPSHOT
[address] [facility] [address] [id number] [phone] Patient: Ramu Henriquez MRN: [id number] :1960 Visit Information Date & Time Provider Department Dept. Phone Encounter #  
 2018  8:30 AM Angelica Cotter, [address] [place] [address] [phone] [id number] Follow-up Instructions Return in about 1 month (around 10/5/2018). Your Appointments 2018 11:00 AM  
Follow Up with Corine Hardin MD  
Cardio Specialist at [facility]) Appt Note: 3 months follow up  
 [id number] [place] [address] [address]  
  
   
 [id number] [place] [address] [facility] Upcoming Health Maintenance Date Due  
 LIPID PANEL Q1 1960 FOOT EXAM Q1 1971 MICROALBUMIN Q1 1971 EYE EXAM RETINAL OR DILATED Q1 1971 DTaP/Tdap/Td series (1 - Tdap) 1982 FOBT Q 1 YEAR AGE 50-75 2011 Pneumococcal 19-64 Highest Risk (2 of 3 - PCV13) 2014 Influenza Age 5 to Adult 2018 HEMOGLOBIN A1C Q6M 10/17/2018 Allergies as of 2018  Review Complete On: 2018 By: Briana Guy LPN Severity Noted Reaction Type Reactions Aleve [Naproxen Sodium]  2011    Itching, Hives Naproxen  2014    Hives Current Immunizations  Reviewed on 3/14/2018 Name Date Influenza Vaccine 2015 12:00 AM, 10/15/2014 12:00 AM, 10/9/2013 12:00 AM  
 Influenza Vaccine (Quad) PF  Incomplete, 2018 11:13 AM  
 Pneumococcal Polysaccharide (PPSV-23) 2013 12:00 AM  
  
 Not reviewed this visit You Were Diagnosed With   
  
 Codes Comments Type 2 diabetes mellitus without complication, without long-term current use of insulin (HCC)    -  Primary ICD-10-CM: E11.9 ICD-9-CM: 250.00 Essential hypertension     ICD-10-CM: I10 
ICD-9-CM: 401.9 Depression, unspecified depression type     ICD-10-CM: F32.9 ICD-9-CM: 547 Encounter for immunization     ICD-10-CM: Y70 ICD-9-CM: V03.89 Vitals BP Pulse Temp Resp Height(growth percentile) Weight(growth percentile) 140/86 (BP 1 Location: Left arm, BP Patient Position: Sitting) 86 96.5 °F (35.8 °C) (Oral) 18 5' 11\" (1.803 m) 224 lb 9.6 oz (101.9 kg) SpO2 BMI Smoking Status 100% 31.33 kg/m2 Never Smoker Vitals History BMI and BSA Data Body Mass Index Body Surface Area  
 31.33 kg/m 2 2.26 m 2 Preferred Pharmacy Pharmacy Name Phone [id number] [address], [address] [phone] Your Updated Medication List  
  
   
This list is accurate as of 9/5/18  9:42 AM.  Always use your most recent med list.  
  
  
  
  
 amitriptyline 50 mg tablet Commonly known as:  ELAVIL Take 1 Tab by mouth nightly. Indications: depression, NEUROPATHIC PAIN  
  
 aspirin delayed-release 81 mg tablet Take  by mouth daily. dilTIAZem  mg ER capsule Commonly known as:  CARDIZEM CD Take 1 Cap by mouth daily. Indications: VENTRICULAR RATE CONTROL IN ATRIAL FIBRILLATION  
  
 ferrous sulfate 325 mg (65 mg iron) EC tablet Commonly known as:  IRON  
TAKE 1 TABLET BY MOUTH ONCE DAILY  
  
 gabapentin 400 mg capsule Commonly known as:  NEURONTIN Take 1 Cap by mouth three (3) times daily. glimepiride 2 mg tablet Commonly known as:  AMARYL  
take 1 tablet by mouth every morning  
  
 glucose blood VI test strips strip Commonly known as:  ASCENSIA AUTODISC VI, ONE TOUCH ULTRA TEST VI  
Use as directed to test blood sugar twice a day IBUPROFEN PO Take  by mouth.  
  
 lisinopril-hydroCHLOROthiazide 10-12.5 mg per tablet Commonly known as:  PRINZIDE, ZESTORETIC  
take 1 tablet by mouth once daily  
  
 magnesium oxide 400 mg tablet Commonly known as:  MAG-OX Take 1 Tab by mouth daily. Indications: hypomagnesemia  
  
 metFORMIN 1,000 mg tablet Commonly known as:  GLUCOPHAGE  
take 1 tablet by mouth twice a day with meals  
  
 trimethoprim-sulfamethoxazole 160-800 mg per tablet Commonly known as:  BACTRIM DS Take 1 Tab by mouth two (2) times a day. Prescriptions Sent to Pharmacy Refills  
 amitriptyline (ELAVIL) 50 mg tablet 3 Sig: Take 1 Tab by mouth nightly. Indications: depression, NEUROPATHIC PAIN Class: Normal  
 Pharmacy: [place] [address], [address] #: [phone] Route: Oral  
  
We Performed the Following ADMIN INFLUENZA VIRUS VAC [ HCP] INFLUENZA VIRUS VAC QUAD,SPLIT,PRESV FREE SYRINGE IM [id number] CPT(R)] REFERRAL TO OPHTHALMOLOGY [REF57 Custom] Follow-up Instructions Return in about 1 month (around 10/5/2018). To-Do List   
 09/05/2018 Lab:  METABOLIC PANEL, COMPREHENSIVE Referral Information Referral ID Referred By Referred To  
  
 [id number] [address] [facility] 114 , Evonne Jones MD   
   [address] Phone: [phone] Fax: [phone] Visits Status Start Date End Date 1 New Request 9/5/18 9/5/19 If your referral has a status of pending review or denied, additional information will be sent to support the outcome of this decision. Introducing [facility]! [facility] introduces [employer] patient portal. Now you can access parts of your medical record, email your doctor's office, and request medication refills online. 1. In your internet browser, go to [email or web address] 2. Click on the First Time User? Click Here link in the Sign In box. You will see the New Member Sign Up page. 3. Enter your [employer] Access Code exactly as it appears below. You will not need to use this code after youve completed the sign-up process. If you do not sign up before the expiration date, you must request a new code. · Unight Access Code: [email or web address] Expires: 11/1/2018 12:03 PM 
 
4. Enter the last four digits of your Social Security Number (xxxx) and Date of Birth (mm/dd/yyyy) as indicated and click Submit. You will be taken to the next sign-up page. 5. Create a Unight ID. This will be your Unight login ID and cannot be changed, so think of one that is secure and easy to remember. 6. Create a Unight password. You can change your password at any time. 7. Enter your Password Reset Question and Answer. This can be used at a later time if you forget your password. 8. Enter your e-mail address. You will receive e-mail notification when new information is available in [address] 9. Click Sign Up. You can now view and download portions of your medical record. 10. Click the Download Summary menu link to download a portable copy of your medical information. If you have questions, please visit the Frequently Asked Questions section of the [employer]ight website. Remember, Unight is NOT to be used for urgent needs. For medical emergencies, dial 911. Now available from your iPhone and Android! Please provide this summary of care documentation to your next provider. Your primary care clinician is listed as Jose Shafer. If you have any questions after today's visit, please call [phone].

## 2018-09-05 NOTE — PROGRESS NOTES
[address] CC: F/U for chronic disease management HPI:  
 
Depression: - Taking Seroquel as prescribed - Reports that it has helped with his mood - Initially stated that he thinks dose may need to be increased, but then stated that his mood was fine. 
- Denies any known side effects from Seroquel, but reports that his weight has been getting progressively higher and thinks it is due to one of his medications HTN: 
- Does not check BP at home 
- Not following any exercise regimen - Talking BP medication as prescribed - Denies any side effects or issues with his medication DMII: 
- Checks blood sugar at home. No log brought in for review. - Reports that his FBS has been in low 100s - Taking his diabetic medication as prescribed - Denies any side effects or issues with his medication 
- Has not had his annual foot exam 
- Has not had his annual eye exam 
 
 
Health Maintenance: - Reports lipid panel was done this year - Colonoscopy done 2-3 years ago and was normal 
- Had Tetanus shot within the past 10 years ROS: Positive items marked in RED 
CON: fever, chills Cardiovascular: palpitations, CP Resp: SOB, cough GI: nausea, vomiting, diarrhea : dysuria, hematuria Past Medical History:  
Diagnosis Date  Alcohol abuse 8/22/2013  CAD (coronary artery disease) \"cardiac stent placed 1 year ago\"  Depression  Diastolic dysfunction 3/3/4777  DMII (diabetes mellitus, type 2) ([facility]  Hypertension  Mild concentric left ventricular hypertrophy (LVH) 7/9/2018  Mitral regurgitation 7/9/2018  Substance abuse  Suicidal thoughts  Tricuspid regurgitation 7/9/2018  Umbilical hernia Past Surgical History:  
Procedure Laterality Date  HX BACK SURGERY  2014  HX CORONARY STENT PLACEMENT  2012 No stent placed Family History Problem Relation Age of Onset  Suicide Father  Diabetes Brother  Psychiatric Disorder Other Social History Social History  Marital status: LEGALLY  Spouse name: N/A  
 Number of children: N/A  
 Years of education: N/A Social History Main Topics  Smoking status: Never Smoker  Smokeless tobacco: Never Used  Alcohol use 37.8 oz/week 63 Cans of beer per week  Drug use: No  
 Sexual activity: Not Currently Other Topics Concern  None Social History Narrative  X 2. Lives with wife and 21year old son. 6year old son was taken away by . Unemployed Allergies Allergen Reactions  Aleve [Naproxen Sodium] Itching and Hives  Naproxen Hives Current Outpatient Prescriptions:  
  ferrous sulfate (IRON) 325 mg (65 mg iron) EC tablet, TAKE 1 TABLET BY MOUTH ONCE DAILY, Disp: 30 Tab, Rfl: 1 
  glimepiride (AMARYL) 2 mg tablet, take 1 tablet by mouth every morning, Disp: 30 Tab, Rfl: 1 
  metFORMIN (GLUCOPHAGE) 1,000 mg tablet, take 1 tablet by mouth twice a day with meals, Disp: 180 Tab, Rfl: 0 
  aspirin delayed-release 81 mg tablet, Take  by mouth daily. , Disp: , Rfl:  
  dilTIAZem CD (CARDIZEM CD) 120 mg ER capsule, Take 1 Cap by mouth daily. Indications: VENTRICULAR RATE CONTROL IN ATRIAL FIBRILLATION, Disp: 30 Cap, Rfl: 5 
  lisinopril-hydroCHLOROthiazide (PRINZIDE, ZESTORETIC) 10-12.5 mg per tablet, take 1 tablet by mouth once daily, Disp: 30 Tab, Rfl: 6 
  gabapentin (NEURONTIN) 400 mg capsule, Take 1 Cap by mouth three (3) times daily. , Disp: 90 Cap, Rfl: 3 
  QUEtiapine (SEROQUEL) 400 mg tablet, Take 1 Tab by mouth nightly., Disp: 90 Tab, Rfl: 1 
  magnesium oxide (MAG-OX) 400 mg tablet, Take 1 Tab by mouth daily. Indications: hypomagnesemia, Disp: 30 Tab, Rfl: 2 
  glucose blood VI test strips (ASCENSIA AUTODISC VI, ONE TOUCH ULTRA TEST VI) strip, Use as directed to test blood sugar twice a day, Disp: 100 Strip, Rfl: 6   IBUPROFEN PO, Take  by mouth., Disp: , Rfl:  
  trimethoprim-sulfamethoxazole (BACTRIM DS) 160-800 mg per tablet, Take 1 Tab by mouth two (2) times a day., Disp: 10 Tab, Rfl: 0 Physical Exam:  
  
/87 (BP 1 Location: Left arm, BP Patient Position: Sitting)  Pulse 88  Temp 96.5 °F (35.8 °C) (Oral)   Resp 18  Ht 5' 11\" (1.803 m)  Wt 224 lb 9.6 oz (101.9 kg)  SpO2 100%  BMI 31.33 kg/m2 General: obese habitus, NAD, conversant Eyes: sclera clear bilaterally, no discharge noted, eyelids normal in appearance HENT: NCAT Lungs: CTAB, normal respiratory effort and rate CV: RRR, no MRGs ABD: soft, non-tender, non-distended, normal bowel sounds Ext: 1-2+ edema in LE bilaterally. No digital cyanosis noted Skin: normal temperature, turgor, color, and texture Psych: alert and oriented to person, place and time, normal affect Neuro: speech normal, moving all extremities, gait normal 
 
Diabetic foot exam:  
 
Left Foot: 
 Visual Exam: normal  (Trace edema) Pulse DP: 2+ (normal) Filament test: normal sensation Vibratory sensation: normal  
 Proprioception: normal 
   
Right Foot: 
 Visual Exam: normal  (Trace edema) Pulse DP: 2+ (normal) Filament test: normal sensation Vibratory sensation: normal  
 Proprioception: normal 
 
 
 
Assessment/Plan Depression, Stable: - Will D/C Seroquel due to likely side effect of weight gain - Will start on trial of amitriptyline - Follow up in one month DMII, Likely Well Controlled: - Reported FBS at goal of <130 
- Will continue current medication regimen - Foot exam done today - Referred for diabetic eye exam 
- Plan for Hemoglobin A1c at F/U visit 
- CMP and urine miroalbumin/cr ordered - Follow up in one month 
  
  
HTN, Inadequately Controlled: - Not at goal BP of <130/80 for the past two visits - Suspect worsening BP is due to weight gain - Will increase current lisinopril-HCTZ regimen to 2 tablets daily - CMP and urine miroalbumin/cr ordered - Follow up in one month Health Maintenance: - Flu shot today - Will need to obtain records of recent lipid panel and colonoscopy Jeannie Berger MD 
9/5/2018, 8:44 AM

## 2018-09-05 NOTE — PROGRESS NOTES
Visit Vitals  /86 (BP 1 Location: Left arm, BP Patient Position: Sitting)  Pulse 86  Temp 96.5 °F (35.8 °C) (Oral)  Resp 18  Ht 5' 11\" (1.803 m)  Wt 224 lb 9.6 oz (101.9 kg)  SpO2 100%  BMI 31.33 kg/m2

## 2018-09-05 NOTE — PROGRESS NOTES
Chief Complaint Patient presents with  Follow Up Chronic Condition 1. Have you been to the ER, urgent care clinic since your last visit? Hospitalized since your last visit? No 
 
2. Have you seen or consulted any other health care providers outside of the [address] [place] [address] since your last visit? Include any pap smears or colon screening. No  
 
Visit Vitals  /87 (BP 1 Location: Left arm, BP Patient Position: Sitting)  Pulse 88  Temp 96.5 °F (35.8 °C) (Oral)  Resp 18  Ht 5' 11\" (1.803 m)  Wt 224 lb 9.6 oz (101.9 kg)  SpO2 100%  BMI 31.33 kg/m2

## 2018-09-06 LAB
CREATININE URINE,9612018: 94 MG/DL (ref 20–370)
MICROALBUMIN,URINE RANDOM 140054: 5.2 MG/DL
MICROALBUMIN/CREAT RATIO: 55 MCG/MG CREAT

## 2018-10-03 ENCOUNTER — OFFICE VISIT (OUTPATIENT)
Dept: FAMILY MEDICINE CLINIC | Age: 58
End: 2018-10-03

## 2018-10-03 VITALS
RESPIRATION RATE: 16 BRPM | HEART RATE: 84 BPM | WEIGHT: 223.6 LBS | SYSTOLIC BLOOD PRESSURE: 137 MMHG | DIASTOLIC BLOOD PRESSURE: 83 MMHG | BODY MASS INDEX: 31.3 KG/M2 | HEIGHT: 71 IN | TEMPERATURE: 96.9 F | OXYGEN SATURATION: 97 %

## 2018-10-03 DIAGNOSIS — F32.A DEPRESSION, UNSPECIFIED DEPRESSION TYPE: ICD-10-CM

## 2018-10-03 DIAGNOSIS — F10.10 ALCOHOL ABUSE: ICD-10-CM

## 2018-10-03 DIAGNOSIS — I10 ESSENTIAL HYPERTENSION: ICD-10-CM

## 2018-10-03 DIAGNOSIS — G62.9 NEUROPATHY: ICD-10-CM

## 2018-10-03 DIAGNOSIS — E11.9 TYPE 2 DIABETES MELLITUS WITHOUT COMPLICATION, WITHOUT LONG-TERM CURRENT USE OF INSULIN (HCC): Primary | ICD-10-CM

## 2018-10-03 DIAGNOSIS — G47.00 INSOMNIA, UNSPECIFIED TYPE: ICD-10-CM

## 2018-10-03 DIAGNOSIS — D64.9 ANEMIA, UNSPECIFIED TYPE: ICD-10-CM

## 2018-10-03 LAB — HBA1C MFR BLD HPLC: 5.7 %

## 2018-10-03 RX ORDER — DOXEPIN HYDROCHLORIDE 100 MG/1
100 CAPSULE ORAL
Qty: 90 CAP | Refills: 0 | Status: SHIPPED | OUTPATIENT
Start: 2018-10-03 | End: 2019-03-21

## 2018-10-03 RX ORDER — QUETIAPINE 300 MG/1
300 TABLET, FILM COATED, EXTENDED RELEASE ORAL
Qty: 90 TAB | Refills: 0 | Status: SHIPPED | OUTPATIENT
Start: 2018-10-03 | End: 2019-01-07 | Stop reason: SDUPTHER

## 2018-10-03 RX ORDER — GLIMEPIRIDE 2 MG/1
TABLET ORAL
Qty: 90 TAB | Refills: 1 | Status: SHIPPED | OUTPATIENT
Start: 2018-10-03 | End: 2019-03-06 | Stop reason: SDUPTHER

## 2018-10-03 RX ORDER — DILTIAZEM HYDROCHLORIDE 120 MG/1
120 CAPSULE, COATED, EXTENDED RELEASE ORAL DAILY
Qty: 30 CAP | Refills: 5 | Status: SHIPPED | OUTPATIENT
Start: 2018-10-03 | End: 2019-01-15 | Stop reason: SDUPTHER

## 2018-10-03 RX ORDER — LISINOPRIL AND HYDROCHLOROTHIAZIDE 12.5; 2 MG/1; MG/1
2 TABLET ORAL DAILY
Qty: 180 TAB | Refills: 1 | Status: SHIPPED | OUTPATIENT
Start: 2018-10-03 | End: 2019-03-06 | Stop reason: SDUPTHER

## 2018-10-03 NOTE — PROGRESS NOTES
[facility]    CC: F/U for chronic disease management    HPI:     Depression/Insomnia:  - Stopped Seroquel and started amitriptyline as discussed   - Reports that amitriptyline did not help him sleep (Increased dosage to 3 tablets), so he resumed taking Seroquel (300 mg nightly)  - Would like to try a new medication and if it does not work would like to continue using Seroquel  - Currently reports no issues with depression        DMII:  - Got requested urine test. Did not get blood work. - Checks blood sugar at home. No log brought in for review. - Taking his diabetic medication as prescribed  - Denies any side effects or issues with his medication  - Took amitriptyline as prescribed. Reports that it did not help with neuropathy        HTN:  - Got requested urine test. Did not get blood work.   - Does not check BP at home  - Not following any exercise regimen  - Talking BP medication as prescribed  - Denies any side effects or issues with his medication        ROS: Positive items marked in RED  CON: fever, chills  Cardiovascular: palpitations, CP  Resp: SOB, cough  GI: nausea, vomiting, diarrhea  : dysuria, hematuria      Past Medical History:   Diagnosis Date    Alcohol abuse 8/22/2013    CAD (coronary artery disease)     \"cardiac stent placed 1 year ago\"    Depression     Diastolic dysfunction 2/9/9586    [facility], [facility]     Hypertension     Mild concentric left ventricular hypertrophy (LVH) 7/9/2018    Mitral regurgitation 7/9/2018    Substance abuse     Suicidal thoughts     Tricuspid regurgitation 6/2/5370    Umbilical hernia        Past Surgical History:   Procedure Laterality Date    HX BACK SURGERY  2014    HX CORONARY STENT PLACEMENT  2012    No stent placed       Family History   Problem Relation Age of Onset    Suicide Father     Diabetes Brother     Psychiatric Disorder Other        Social History     Social History    Marital status: LEGALLY      Spouse name: N/A    Number of children: N/A    Years of education: N/A     Social History Main Topics    Smoking status: Never Smoker    Smokeless tobacco: Never Used    Alcohol use 37.8 oz/week     63 Cans of beer per week    Drug use: No    Sexual activity: Not Currently     Other Topics Concern    Not on file     Social History Narrative     X 2. Lives with wife and 21year old son. 6year old son was taken away by . Unemployed        Allergies   Allergen Reactions    Aleve [Naproxen Sodium] Itching and Hives    Naproxen Hives         Current Outpatient Prescriptions:     amitriptyline (ELAVIL) 50 mg tablet, Take 1 Tab by mouth nightly. Indications: depression, NEUROPATHIC PAIN, Disp: 30 Tab, Rfl: 3    lisinopril-hydroCHLOROthiazide (PRINZIDE, ZESTORETIC) 10-12.5 mg per tablet, Take 2 Tabs by mouth daily. , Disp: 30 Tab, Rfl: 6    ferrous sulfate (IRON) 325 mg (65 mg iron) EC tablet, TAKE 1 TABLET BY MOUTH ONCE DAILY, Disp: 30 Tab, Rfl: 1    glimepiride (AMARYL) 2 mg tablet, take 1 tablet by mouth every morning, Disp: 30 Tab, Rfl: 1    metFORMIN (GLUCOPHAGE) 1,000 mg tablet, take 1 tablet by mouth twice a day with meals, Disp: 180 Tab, Rfl: 0    aspirin delayed-release 81 mg tablet, Take  by mouth daily. , Disp: , Rfl:     IBUPROFEN PO, Take  by mouth., Disp: , Rfl:     dilTIAZem CD (CARDIZEM CD) 120 mg ER capsule, Take 1 Cap by mouth daily. Indications: VENTRICULAR RATE CONTROL IN ATRIAL FIBRILLATION, Disp: 30 Cap, Rfl: 5    gabapentin (NEURONTIN) 400 mg capsule, Take 1 Cap by mouth three (3) times daily. , Disp: 90 Cap, Rfl: 3    magnesium oxide (MAG-OX) 400 mg tablet, Take 1 Tab by mouth daily.  Indications: hypomagnesemia, Disp: 30 Tab, Rfl: 2    glucose blood VI test strips (ASCENSIA AUTODISC VI, ONE TOUCH ULTRA TEST VI) strip, Use as directed to test blood sugar twice a day, Disp: 100 Strip, Rfl: 6    Physical Exam:      /83 (BP 1 Location: Left arm, BP Patient Position: Sitting)  Pulse 84  Temp 96.9 °F (36.1 °C) (Oral)   Resp 16  Ht 5' 11\" (1.803 m)  Wt 223 lb 9.6 oz (101.4 kg)  SpO2 97%  BMI 31.19 kg/m2    General: obese habitus, NAD, conversant  Eyes: sclera clear bilaterally, no discharge noted, eyelids normal in appearance  HENT: NCAT  Lungs: CTAB, normal respiratory effort and rate  CV: RRR, no MRGs  ABD: soft, non-tender, non-distended, normal bowel sounds  Ext: 1-2+ edema in LE bilaterally. No digital cyanosis noted  Skin: normal temperature, turgor, color, and texture  Psych: alert and oriented to person, place and time, normal affect  Neuro: speech normal, moving all extremities, gait normal      Results for Marybeth Man (MRN [id number]):   Ref. Range 10/3/2018 16:06   Hemoglobin A1c (POC) Latest Units: % 5.7       Component Value Flag Ref Range Units Status   Creatinine Urine 94   20 - 370 mg/dL Final   Microalbumin, urine 5.2    mg/dL Final   Microalbumin/Creat Ratio 55 Abnormally high   H  <30 mcg/mg creat Final         Assessment/Plan     DMII, Well Controlled:  - HGBA1c at goal of <7%  - Mircoalbumin/Cr ratio suggestive of possible microalbuminuria. Needs repeat testing in 2-5 months  - Will continue current blood sugar regimen  - Follow up in ~ 6 months      HTN, Improving:  - Not at goal of 130/80  - Mircoalbumin/Cr ratio suggestive of possible microalbuminuria.  Needs repeat testing in 2-5 months  - Advised to get blood work done  - Will plan to discuss adjustment at F/U if BP remains not at goal  - F/U in one month      Neuropathy:  - Likely 2/2 diabetes, although deficiencies 2/2 to alcohol use possible  - Will check levels of folate and vitamin B 1, 6, & 12  - Will stop amitriptyline and do trial on doxepin  - F/U in one month      Depression/Insomnia:  - Will stop amitriptyline and do trial on doxepin  - Given prescription of Seroquel as precaution, if he choose not to continue doxepin and to resume prior regimen  - F/U in one month        Shena Gannon MD  10/3/2018, 3:52 PM

## 2018-10-03 NOTE — PROGRESS NOTES
Chief Complaint   Patient presents with    Follow Up Chronic Condition     Numbness in right hand and stiffness in left hand     1. Have you been to the ER, urgent care clinic since your last visit? Hospitalized since your last visit? No    2. Have you seen or consulted any other health care providers outside of the [address] [place] [address] since your last visit? Include any pap smears or colon screening.  No     Visit Vitals    /83 (BP 1 Location: Left arm, BP Patient Position: Sitting)    Pulse 84    Temp 96.9 °F (36.1 °C) (Oral)    Resp 16    Ht 5' 11\" (1.803 m)    Wt 223 lb 9.6 oz (101.4 kg)    SpO2 97%    BMI 31.19 kg/m2

## 2018-10-03 NOTE — MR AVS SNAPSHOT
[facility] [place] [address] [facility] [address] [id number] 
[phone] Patient: Jordyn Sanchez MRN: [id number] :1960 Visit Information Date & Time Provider Department Dept. Phone Encounter #  
 10/3/2018  3:45 PM Ike Joya, [address] [facility] [phone] [id number] Follow-up Instructions Return in about 1 month (around 11/3/2018) for Needs appointment for blood work. Your Appointments 2018 11:00 AM  
Follow Up with Lisa Prabhakar MD  
Cardio Specialist at [facility]) Appt Note: 3 months follow up  
 [address] [facility] [address]  
  
   
 [address] [facility] Upcoming Health Maintenance Date Due  
 LIPID PANEL Q1 1960 EYE EXAM RETINAL OR DILATED Q1 1971 DTaP/Tdap/Td series (1 - Tdap) 1982 Shingrix Vaccine Age 50> (1 of 2) 2011 FOBT Q 1 YEAR AGE 50-75 2011 HEMOGLOBIN A1C Q6M 10/17/2018 FOOT EXAM Q1 2019 MICROALBUMIN Q1 2019 Allergies as of 10/3/2018  Review Complete On: 10/3/2018 By: Jose Stacy LPN Severity Noted Reaction Type Reactions Aleve [Naproxen Sodium]  2011    Itching, Hives Naproxen  2014    Hives Current Immunizations  Reviewed on 3/14/2018 Name Date Influenza Vaccine 2015 12:00 AM, 10/15/2014 12:00 AM, 10/9/2013 12:00 AM  
 Influenza Vaccine (Quad) PF 2018, 2018 11:13 AM  
 Pneumococcal Polysaccharide (PPSV-23) 2013 12:00 AM  
  
 Not reviewed this visit You Were Diagnosed With   
  
 Codes Comments Type 2 diabetes mellitus without complication, without long-term current use of insulin (HCC)    -  Primary ICD-10-CM: E11.9 ICD-9-CM: 250.00 Depression, unspecified depression type     ICD-10-CM: F32.9 ICD-9-CM: 118  Insomnia, unspecified type     ICD-10-CM: G47.00 
 ICD-9-CM: 780.52 Essential hypertension     ICD-10-CM: I10 
ICD-9-CM: 401.9 Neuropathy     ICD-10-CM: G62.9 ICD-9-CM: 355.9 Anemia, unspecified type     ICD-10-CM: D64.9 ICD-9-CM: 496. 9 Alcohol abuse     ICD-10-CM: F10.10 ICD-9-CM: 305.00 Vitals BP Pulse Temp Resp Height(growth percentile) Weight(growth percentile) 137/83 (BP 1 Location: Left arm, BP Patient Position: Sitting) 84 96.9 °F (36.1 °C) (Oral) 16 5' 11\" (1.803 m) 223 lb 9.6 oz (101.4 kg) SpO2 BMI Smoking Status 97% 31.19 kg/m2 Never Smoker Vitals History BMI and BSA Data Body Mass Index Body Surface Area  
 31.19 kg/m 2 2.25 m 2 Preferred Pharmacy Pharmacy Name Phone [facility] [address], [place] - [address] [place] [address] [phone] Your Updated Medication List  
  
   
This list is accurate as of 10/3/18  4:33 PM.  Always use your most recent med list.  
  
  
  
  
 aspirin delayed-release 81 mg tablet Take  by mouth daily. dilTIAZem  mg ER capsule Commonly known as:  CARDIZEM CD Take 1 Cap by mouth daily. Indications: VENTRICULAR RATE CONTROL IN ATRIAL FIBRILLATION  
  
 doxepin 100 mg capsule Commonly known as:  SINEquan Take 1 Cap by mouth nightly. ferrous sulfate 325 mg (65 mg iron) EC tablet Commonly known as:  IRON  
TAKE 1 TABLET BY MOUTH ONCE DAILY  
  
 gabapentin 400 mg capsule Commonly known as:  NEURONTIN Take 1 Cap by mouth three (3) times daily. glimepiride 2 mg tablet Commonly known as:  AMARYL  
take 1 tablet by mouth every morning  
  
 glucose blood VI test strips strip Commonly known as:  ASCENSIA AUTODISC VI, ONE TOUCH ULTRA TEST VI  
Use as directed to test blood sugar twice a day IBUPROFEN PO Take  by mouth.  
  
 lisinopril-hydroCHLOROthiazide 20-12.5 mg per tablet Commonly known as:  Margaret Phong Take 2 Tabs by mouth daily. magnesium oxide 400 mg tablet Commonly known as:  MAG-OX Take 1 Tab by mouth daily. Indications: hypomagnesemia  
  
 metFORMIN 1,000 mg tablet Commonly known as:  GLUCOPHAGE  
take 1 tablet by mouth twice a day with meals QUEtiapine  mg sr tablet Commonly known as:  SEROquel XR Take 1 Tab by mouth nightly. Prescriptions Sent to Pharmacy Refills QUEtiapine SR (SEROQUEL XR) 300 mg sr tablet 0 Sig: Take 1 Tab by mouth nightly. Class: Normal  
 Pharmacy: [facility] [facility] [address], [address] Ph #: [phone] Route: Oral  
 doxepin (SINEQUAN) 100 mg capsule 0 Sig: Take 1 Cap by mouth nightly. Class: Normal  
 Pharmacy: [facility] [facility] [address], [address] Ph #: [phone] Route: Oral  
 glimepiride (AMARYL) 2 mg tablet 1 Sig: take 1 tablet by mouth every morning Class: Normal  
 Pharmacy: [facility] [facility] [address], [place] - [address] [facility] Ph #: [phone]  
 lisinopril-hydroCHLOROthiazide (PRINZIDE, ZESTORETIC) 20-12.5 mg per tablet 1 Sig: Take 2 Tabs by mouth daily. Class: Normal  
 Pharmacy: [facility] [facility] [address], [address] Ph #: [phone] Route: Oral  
 dilTIAZem CD (CARDIZEM CD) 120 mg ER capsule 5 Sig: Take 1 Cap by mouth daily. Indications: VENTRICULAR RATE CONTROL IN ATRIAL FIBRILLATION Class: Normal  
 Pharmacy: [facility] [facility] [address], [address] Ph #: [phone] Route: Oral  
  
We Performed the Following AMB POC HEMOGLOBIN A1C [06844 CPT(R)] Follow-up Instructions Return in about 1 month (around 11/3/2018) for Needs appointment for blood work. To-Do List   
 10/03/2018 Lab:  VITAMIN B1, WHOLE BLOOD   
  
 10/03/2018 Lab:  VITAMIN B12 & FOLATE   
  
 10/03/2018 Lab:  VITAMIN B6 Introducing [facility]!    
 [facility] introduces [employer] patient portal. Now you can access parts of your medical record, email your doctor's office, and request medication refills online. 1. In your internet browser, go to [email or web address] Runnit/[employer] 2. Click on the First Time User? Click Here link in the Sign In box. You will see the New Member Sign Up page. 3. Enter your [employer] Access Code exactly as it appears below. You will not need to use this code after youve completed the sign-up process. If you do not sign up before the expiration date, you must request a new code. · [employer] Access Code: [email or web address] Expires: 11/1/2018 12:03 PM 
 
4. Enter the last four digits of your Social Security Number (xxxx) and Date of Birth (mm/dd/yyyy) as indicated and click Submit. You will be taken to the next sign-up page. 5. Create a [employer] ID. This will be your [employer] login ID and cannot be changed, so think of one that is secure and easy to remember. 6. Create a [employer] password. You can change your password at any time. 7. Enter your Password Reset Question and Answer. This can be used at a later time if you forget your password. 8. Enter your e-mail address. You will receive e-mail notification when new information is available in [address] 9. Click Sign Up. You can now view and download portions of your medical record. 10. Click the Download Summary menu link to download a portable copy of your medical information. If you have questions, please visit the Frequently Asked Questions section of the [employer] website. Remember, [employer] is NOT to be used for urgent needs. For medical emergencies, dial 911. Now available from your iPhone and Android! Please provide this summary of care documentation to your next provider. Your primary care clinician is listed as Yesenia Bajwa. If you have any questions after today's visit, please call [phone].

## 2018-10-05 ENCOUNTER — HOSPITAL ENCOUNTER (OUTPATIENT)
Dept: LAB | Age: 58
Discharge: HOME | End: 2018-10-05
Payer: MEDICAID

## 2018-10-05 DIAGNOSIS — I10 ESSENTIAL HYPERTENSION: ICD-10-CM

## 2018-10-05 DIAGNOSIS — E11.9 TYPE 2 DIABETES MELLITUS WITHOUT COMPLICATION, WITHOUT LONG-TERM CURRENT USE OF INSULIN (HCC): ICD-10-CM

## 2018-10-05 LAB
ALBUMIN SERPL-MCNC: 3.6 G/DL (ref 3.4–5)
ALBUMIN/GLOB SERPL: 0.9 {RATIO} (ref 0.8–1.7)
ALP SERPL-CCNC: 118 U/L (ref 45–117)
ALT SERPL-CCNC: 85 U/L (ref 16–61)
ANION GAP SERPL CALC-SCNC: 18 MMOL/L (ref 3–18)
AST SERPL-CCNC: 88 U/L (ref 15–37)
BILIRUB SERPL-MCNC: 0.6 MG/DL (ref 0.2–1)
BUN SERPL-MCNC: 13 MG/DL (ref 7–18)
BUN/CREAT SERPL: 13 (ref 12–20)
CALCIUM SERPL-MCNC: 8.5 MG/DL (ref 8.5–10.1)
CHLORIDE SERPL-SCNC: 102 MMOL/L (ref 100–108)
CO2 SERPL-SCNC: 16 MMOL/L (ref 21–32)
CREAT SERPL-MCNC: 1 MG/DL (ref 0.6–1.3)
FOLATE SERPL-MCNC: 13.5 NG/ML (ref 3.1–17.5)
GLOBULIN SER CALC-MCNC: 4 G/DL (ref 2–4)
GLUCOSE SERPL-MCNC: 315 MG/DL (ref 74–99)
POTASSIUM SERPL-SCNC: 4 MMOL/L (ref 3.5–5.5)
PROT SERPL-MCNC: 7.6 G/DL (ref 6.4–8.2)
SODIUM SERPL-SCNC: 136 MMOL/L (ref 136–145)
VIT B12 SERPL-MCNC: 367 PG/ML (ref 211–911)

## 2018-10-05 PROCEDURE — 82607 VITAMIN B-12: CPT | Performed by: FAMILY MEDICINE

## 2018-10-05 PROCEDURE — 80053 COMPREHEN METABOLIC PANEL: CPT | Performed by: FAMILY MEDICINE

## 2018-10-05 PROCEDURE — 84207 ASSAY OF VITAMIN B-6: CPT | Performed by: FAMILY MEDICINE

## 2018-10-05 PROCEDURE — 84425 ASSAY OF VITAMIN B-1: CPT | Performed by: FAMILY MEDICINE

## 2018-10-05 PROCEDURE — 36415 COLL VENOUS BLD VENIPUNCTURE: CPT | Performed by: FAMILY MEDICINE

## 2018-10-07 LAB — VIT B1 BLD-SCNC: 79.2 NMOL/L (ref 66.5–200)

## 2018-10-09 LAB — VIT B6 SERPL-MCNC: 11.4 UG/L (ref 5.3–46.7)

## 2018-10-15 RX ORDER — LANOLIN ALCOHOL/MO/W.PET/CERES
400 CREAM (GRAM) TOPICAL DAILY
Qty: 30 TAB | Refills: 2 | Status: SHIPPED | OUTPATIENT
Start: 2018-10-15 | End: 2019-01-15 | Stop reason: SDUPTHER

## 2018-11-11 DIAGNOSIS — E11.42 TYPE 2 DIABETES MELLITUS WITH DIABETIC POLYNEUROPATHY, WITHOUT LONG-TERM CURRENT USE OF INSULIN (HCC): ICD-10-CM

## 2018-11-11 RX ORDER — FERROUS SULFATE 324(65)MG
TABLET, DELAYED RELEASE (ENTERIC COATED) ORAL
Qty: 30 TAB | Refills: 1 | Status: SHIPPED | OUTPATIENT
Start: 2018-11-11 | End: 2019-01-08 | Stop reason: SDUPTHER

## 2018-11-11 RX ORDER — METFORMIN HYDROCHLORIDE 1000 MG/1
TABLET ORAL
Qty: 180 TAB | Refills: 0 | Status: SHIPPED | OUTPATIENT
Start: 2018-11-11 | End: 2019-02-22 | Stop reason: SDUPTHER

## 2018-11-13 DIAGNOSIS — M54.12 CERVICAL RADICULOPATHY: ICD-10-CM

## 2018-11-14 RX ORDER — GABAPENTIN 400 MG/1
CAPSULE ORAL
Qty: 90 CAP | Refills: 3 | Status: SHIPPED | OUTPATIENT
Start: 2018-11-14 | End: 2019-03-06 | Stop reason: SDUPTHER

## 2018-12-14 ENCOUNTER — OFFICE VISIT (OUTPATIENT)
Dept: CARDIOLOGY CLINIC | Age: 58
End: 2018-12-14

## 2018-12-14 VITALS
WEIGHT: 222 LBS | DIASTOLIC BLOOD PRESSURE: 93 MMHG | BODY MASS INDEX: 31.08 KG/M2 | OXYGEN SATURATION: 97 % | HEIGHT: 71 IN | HEART RATE: 94 BPM | SYSTOLIC BLOOD PRESSURE: 132 MMHG

## 2018-12-14 DIAGNOSIS — I51.89 DIASTOLIC DYSFUNCTION: ICD-10-CM

## 2018-12-14 DIAGNOSIS — I48.0 PAROXYSMAL ATRIAL FIBRILLATION (HCC): Primary | ICD-10-CM

## 2018-12-14 DIAGNOSIS — F10.10 ALCOHOL ABUSE: ICD-10-CM

## 2018-12-14 DIAGNOSIS — I25.10 CORONARY ARTERY DISEASE INVOLVING NATIVE HEART WITHOUT ANGINA PECTORIS, UNSPECIFIED VESSEL OR LESION TYPE: ICD-10-CM

## 2018-12-14 DIAGNOSIS — I51.7 MILD CONCENTRIC LEFT VENTRICULAR HYPERTROPHY (LVH): ICD-10-CM

## 2018-12-14 DIAGNOSIS — Z98.61 H/O CORONARY ANGIOPLASTY: ICD-10-CM

## 2018-12-14 DIAGNOSIS — I10 ESSENTIAL HYPERTENSION: ICD-10-CM

## 2018-12-14 DIAGNOSIS — I34.0 MITRAL VALVE INSUFFICIENCY, UNSPECIFIED ETIOLOGY: ICD-10-CM

## 2018-12-14 DIAGNOSIS — E11.9 TYPE 2 DIABETES MELLITUS WITHOUT COMPLICATION, WITHOUT LONG-TERM CURRENT USE OF INSULIN (HCC): ICD-10-CM

## 2018-12-14 NOTE — PROGRESS NOTES
1. Have you been to the ER, urgent care clinic since your last visit? Hospitalized since your last visit? No    2. Have you seen or consulted any other health care providers outside of the [address] [place] [address] since your last visit? Include any pap smears or colon screening.  No

## 2018-12-28 NOTE — PROGRESS NOTES
Subjective:      Mdaeline Villar is in the office today for cardiac evaluation. He is a 63 y.o. man that was  diagnosed with atrial fibrillation in or around August 2018. The patient had gone to a pharmacy and had his blood pressure checked and it was noted his heart rate was 110 beats per minute. He subsequently went  to the emergency department. He reported he was given Ativan and his heart rate actually increased initially. He had an evaluation including echocardiography at that time. His ejection fraction was normal at 54%. There was mild tricuspid and mitral regurgitation. There was grade 1 diastolic dysfunction. That particular echocardiogram was done at [address] [place] [address].     In the office today, the patient reports that he is doing well. He has been taking his medications but did not do so today he has had no shortness of breath or chest pain. He has had no peripheral swelling which he did have several months ago. He does have some occasional palpitations which usually occur in the middle of the night. Patient's cardiac risk factors are family history, diabetes mellitus, hypertension.         Patient Active Problem List    Diagnosis Date Noted    Family history of premature CAD 08/17/2018    Mild concentric left ventricular hypertrophy (LVH) 62/68/0970    Diastolic dysfunction 30/55/9266    Tricuspid regurgitation 07/09/2018    Mitral regurgitation 07/09/2018    Alcohol dependence ([address] 07/09/2018    Atrial fibrillation ([address] 07/04/2018    Tachycardia 07/03/2018    CAD (coronary artery disease) 06/13/2018    Pancytopenia ([address] 04/17/2018    Essential hypertension 04/17/2018    Type 2 diabetes mellitus without complication, without long-term current use of insulin ([address] 04/17/2018    Depression 01/12/2018    Suicidal thoughts 01/12/2018    H/O lumbosacral spine surgery 01/04/2017    Chronic low back pain with sciatica 01/04/2017    H/O coronary angioplasty 01/04/2017    Anxiety and depression 08/22/2013    Alcohol abuse 08/22/2013     Current Outpatient Medications   Medication Sig Dispense Refill    gabapentin (NEURONTIN) 400 mg capsule take 1 capsule by mouth three times a day 90 Cap 3    metFORMIN (GLUCOPHAGE) 1,000 mg tablet TAKE 1 TABLET BY MOUTH TWICE A DAY WITH MEALS 180 Tab 0    ferrous sulfate 324 mg (65 mg iron) tablet take 1 tablet by mouth once daily 30 Tab 1    magnesium oxide (MAG-OX) 400 mg tablet Take 1 Tab by mouth daily. Indications: hypomagnesemia 30 Tab 2    QUEtiapine SR (SEROQUEL XR) 300 mg sr tablet Take 1 Tab by mouth nightly. 90 Tab 0    doxepin (SINEQUAN) 100 mg capsule Take 1 Cap by mouth nightly. 90 Cap 0    glimepiride (AMARYL) 2 mg tablet take 1 tablet by mouth every morning 90 Tab 1    lisinopril-hydroCHLOROthiazide (PRINZIDE, ZESTORETIC) 20-12.5 mg per tablet Take 2 Tabs by mouth daily. 180 Tab 1    dilTIAZem CD (CARDIZEM CD) 120 mg ER capsule Take 1 Cap by mouth daily. Indications: VENTRICULAR RATE CONTROL IN ATRIAL FIBRILLATION 30 Cap 5    aspirin delayed-release 81 mg tablet Take  by mouth daily.  IBUPROFEN PO Take  by mouth.       glucose blood VI test strips (ASCENSIA AUTODISC VI, ONE TOUCH ULTRA TEST VI) strip Use as directed to test blood sugar twice a day 100 Strip 6     Allergies   Allergen Reactions    Aleve [Naproxen Sodium] Itching and Hives    Naproxen Hives     Past Medical History:   Diagnosis Date    Alcohol abuse 8/22/2013    CAD (coronary artery disease)     \"cardiac stent placed 1 year ago\"    Depression     Diastolic dysfunction 7/1/4499    DM[facility]diabetes mellitus[facility] type 2) [facility]     Hypertension     Mild concentric left ventricular hypertrophy (LVH) 7/9/2018    Mitral regurgitation 7/9/2018    Substance abuse ([facility]     Suicidal thoughts     Tricuspid regurgitation 5/2/0131    Umbilical hernia      Past Surgical History:   Procedure Laterality Date    HX BACK SURGERY  2014    HX CORONARY STENT PLACEMENT  2012 No stent placed     Family History   Problem Relation Age of Onset    Suicide Father     Diabetes Brother     Psychiatric Disorder Other      Social History     Tobacco Use   Smoking Status Never Smoker   Smokeless Tobacco Never Used          Review of Systems, additional:  Constitutional: negative  Eyes: negative  Respiratory: negative  Cardiovascular: positive for palpitations, dyspnea on exertion  Gastrointestinal: negative  Musculoskeletal:negative  Neurological: negative  Behvioral/Psych: negative  Endocrine: negative  ENT: negative    Objective:     Visit Vitals  BP (!) 132/93   Pulse 94   Ht 5' 11\" (1.803 m)   Wt 222 lb (100.7 kg)   SpO2 97%   BMI 30.96 kg/m²     General:  alert, cooperative, no distress   Chest Wall: inspection normal - no chest wall deformities or tenderness, respiratory effort normal   Lung: clear to auscultation bilaterally   Heart:  irregularly irregular rhythm with rate 81   Abdomen: Distended , nontender. + fluid wave   Extremities: extremities normal, atraumatic, no cyanosis. There was 3+ leg edema Skin: erythematous pustular rash on trunk   Neuro: alert, oriented, normal speech, no focal findings or movement disorder noted     EK/3/2018; Atrial fibrillation. Diffuse nondiagnostic ST & T wave abnormalities. Assessment/Plan:       ICD-10-CM ICD-9-CM    1. Paroxysmal atrial fibrillation ([address] [id number] score 2, excessive risk for anticoagulants. Continue ASA. Continue Diltiazem. Heart rate increased in the office today but patient failed to take his medications this morning. Will continue the same meds and have patient to return in 3 months. May consider trial of cardioversion at that time I48.0 427.31 AMB POC EKG ROUTINE W/ 12 LEADS, INTER & REP   2. Essential hypertension, elevated in the office today but the patient has not taken his medications today I10 401.9 lisinopril-hydroCHLOROthiazide (PRINZIDE, ZESTORETIC) 10-12.5 mg per tablet   3.  Mild concentric left ventricular hypertrophy (LVH) I51.7 429.3    4. Mitral valve insufficiency, unspecified etiology, mild I34.0 424.0    5. Tricuspid valve insufficiency, unspecified etiology, mild I07.1 397.0    6. Type 2 diabetes mellitus without complication, without long-term current use of insulin (HCC) E11.9 250.00    7. Family history of premature CAD Z82.49 V17.3    8       CAD, patient reports prior coronary stenting with records pending.

## 2019-01-02 DIAGNOSIS — F32.A DEPRESSION, UNSPECIFIED DEPRESSION TYPE: ICD-10-CM

## 2019-01-02 DIAGNOSIS — G47.00 INSOMNIA, UNSPECIFIED TYPE: ICD-10-CM

## 2019-01-07 RX ORDER — QUETIAPINE 300 MG/1
300 TABLET, FILM COATED, EXTENDED RELEASE ORAL
Qty: 90 TAB | Refills: 0 | Status: SHIPPED | OUTPATIENT
Start: 2019-01-07 | End: 2019-04-29 | Stop reason: SDUPTHER

## 2019-01-07 RX ORDER — QUETIAPINE FUMARATE 400 MG/1
TABLET, FILM COATED ORAL
Qty: 90 TAB | Refills: 1 | OUTPATIENT
Start: 2019-01-07

## 2019-01-10 RX ORDER — FERROUS SULFATE 325(65) MG
TABLET, DELAYED RELEASE (ENTERIC COATED) ORAL
Qty: 30 TAB | Refills: 1 | Status: SHIPPED | OUTPATIENT
Start: 2019-01-10 | End: 2019-01-16 | Stop reason: SDUPTHER

## 2019-01-15 NOTE — TELEPHONE ENCOUNTER
PCP: Neena Parson MD    Last appt: 11/21/2018  No future appointments. Requested Prescriptions     Pending Prescriptions Disp Refills    magnesium oxide (MAG-OX) 400 mg tablet 30 Tab 2     Sig: Take 1 Tab by mouth daily.  dilTIAZem CD (CARDIZEM CD) 120 mg ER capsule 90 Cap 3     Sig: Take 1 Cap by mouth daily.

## 2019-01-16 RX ORDER — DILTIAZEM HYDROCHLORIDE 120 MG/1
120 CAPSULE, COATED, EXTENDED RELEASE ORAL DAILY
Qty: 90 CAP | Refills: 3 | Status: SHIPPED | OUTPATIENT
Start: 2019-01-16 | End: 2019-03-06 | Stop reason: SDUPTHER

## 2019-01-16 RX ORDER — LANOLIN ALCOHOL/MO/W.PET/CERES
400 CREAM (GRAM) TOPICAL DAILY
Qty: 90 TAB | Refills: 2 | Status: SHIPPED | OUTPATIENT
Start: 2019-01-16 | End: 2019-08-19 | Stop reason: SDUPTHER

## 2019-02-14 ENCOUNTER — OFFICE VISIT (OUTPATIENT)
Dept: CARDIOLOGY CLINIC | Age: 59
End: 2019-02-14

## 2019-02-14 VITALS
OXYGEN SATURATION: 97 % | WEIGHT: 216 LBS | BODY MASS INDEX: 30.24 KG/M2 | HEIGHT: 71 IN | SYSTOLIC BLOOD PRESSURE: 132 MMHG | DIASTOLIC BLOOD PRESSURE: 84 MMHG | HEART RATE: 80 BPM

## 2019-02-14 DIAGNOSIS — Z98.61 H/O CORONARY ANGIOPLASTY: ICD-10-CM

## 2019-02-14 DIAGNOSIS — G47.00 INSOMNIA, UNSPECIFIED TYPE: Primary | ICD-10-CM

## 2019-02-14 DIAGNOSIS — I10 ESSENTIAL HYPERTENSION: ICD-10-CM

## 2019-02-14 DIAGNOSIS — I48.0 PAROXYSMAL ATRIAL FIBRILLATION (HCC): ICD-10-CM

## 2019-02-14 DIAGNOSIS — F10.10 ALCOHOL ABUSE: ICD-10-CM

## 2019-02-14 DIAGNOSIS — I34.0 MITRAL VALVE INSUFFICIENCY, UNSPECIFIED ETIOLOGY: ICD-10-CM

## 2019-02-14 DIAGNOSIS — I51.7 MILD CONCENTRIC LEFT VENTRICULAR HYPERTROPHY (LVH): ICD-10-CM

## 2019-02-14 DIAGNOSIS — I07.1 TRICUSPID VALVE INSUFFICIENCY, UNSPECIFIED ETIOLOGY: ICD-10-CM

## 2019-02-14 DIAGNOSIS — I25.10 CORONARY ARTERY DISEASE INVOLVING NATIVE HEART WITHOUT ANGINA PECTORIS, UNSPECIFIED VESSEL OR LESION TYPE: ICD-10-CM

## 2019-02-14 DIAGNOSIS — I51.89 DIASTOLIC DYSFUNCTION: ICD-10-CM

## 2019-02-14 DIAGNOSIS — E11.9 TYPE 2 DIABETES MELLITUS WITHOUT COMPLICATION, WITHOUT LONG-TERM CURRENT USE OF INSULIN (HCC): ICD-10-CM

## 2019-02-14 DIAGNOSIS — Z82.49 FAMILY HISTORY OF PREMATURE CAD: ICD-10-CM

## 2019-02-14 NOTE — PROGRESS NOTES
1. Have you been to the ER, urgent care clinic since your last visit? Hospitalized since your last visit? No    2. Have you seen or consulted any other health care providers outside of the [address] [place] [address] since your last visit? Include any pap smears or colon screening.  No

## 2019-02-14 NOTE — PATIENT INSTRUCTIONS
Sent referral to [facility]- they will call to set up appointment for you

## 2019-02-14 NOTE — PROGRESS NOTES
1. Have you been to the ER, urgent care clinic since your last visit? Hospitalized since your last visit? No    2. Have you seen or consulted any other health care providers outside of the [address] [place] [address] since your last visit? Include any pap smears or colon screening.  No

## 2019-02-22 DIAGNOSIS — E11.42 TYPE 2 DIABETES MELLITUS WITH DIABETIC POLYNEUROPATHY, WITHOUT LONG-TERM CURRENT USE OF INSULIN (HCC): ICD-10-CM

## 2019-02-23 NOTE — PROGRESS NOTES
Subjective:      Joe Sharma is in the office today for cardiac evaluation. He is a 63 y.o. man that was  diagnosed with atrial fibrillation in August 2018. The patient had gone to a pharmacy and had his blood pressure checked and his heart rate was 110 beats per minute at that time. He subsequently went  to the emergency department. He reported he was given Ativan and his heart rate actually increased initially. He had an evaluation including echocardiography. His ejection fraction was normal at 54%. There was mild tricuspid and mitral regurgitation. There was grade 1 diastolic dysfunction. That particular echocardiogram was done at [facility].     In the office on 12/14/2018, the patient reported that he was doing well. He had been taking his medications. He has had no peripheral swelling which he did have several months ago. He had some occasional palpitations which usually occurred in the middle of the night. In the office today he reports that he is tired \"all the time \". He reports a long history of sleep disturbance. He was told that he had obstructive sleep apnea years ago. After losing weight, he no longer felt he had that condition. He denies limiting shortness of breath. He has had no peripheral swelling. Patient's cardiac risk factors are family history, diabetes mellitus, hypertension.         Patient Active Problem List    Diagnosis Date Noted    Family history of premature CAD 08/17/2018    Mild concentric left ventricular hypertrophy (LVH) 81/03/9516    Diastolic dysfunction 35/36/3816    Tricuspid regurgitation 07/09/2018    Mitral regurgitation 07/09/2018    Alcohol dependence ([address] 07/09/2018    Atrial fibrillation ([address] 07/04/2018    Tachycardia 07/03/2018    CAD (coronary artery disease) 06/13/2018    Pancytopenia ([address] 04/17/2018    Essential hypertension 04/17/2018    Type 2 diabetes mellitus without complication, without long-term current use of insulin ([facility] 04/17/2018    Depression 01/12/2018    Suicidal thoughts 01/12/2018    H/O lumbosacral spine surgery 01/04/2017    Chronic low back pain with sciatica 01/04/2017    H/O coronary angioplasty 01/04/2017    Anxiety and depression 08/22/2013    Alcohol abuse 08/22/2013     Current Outpatient Medications   Medication Sig Dispense Refill    magnesium oxide (MAG-OX) 400 mg tablet Take 1 Tab by mouth daily. 90 Tab 2    dilTIAZem CD (CARDIZEM CD) 120 mg ER capsule Take 1 Cap by mouth daily. 90 Cap 3    ferrous sulfate (IRON) 325 mg (65 mg iron) EC tablet take 1 tablet once daily 90 Tab 1    QUEtiapine SR (SEROQUEL XR) 300 mg sr tablet Take 1 Tab by mouth nightly. 90 Tab 0    gabapentin (NEURONTIN) 400 mg capsule take 1 capsule by mouth three times a day 90 Cap 3    metFORMIN (GLUCOPHAGE) 1,000 mg tablet TAKE 1 TABLET BY MOUTH TWICE A DAY WITH MEALS 180 Tab 0    doxepin (SINEQUAN) 100 mg capsule Take 1 Cap by mouth nightly. 90 Cap 0    glimepiride (AMARYL) 2 mg tablet take 1 tablet by mouth every morning 90 Tab 1    lisinopril-hydroCHLOROthiazide (PRINZIDE, ZESTORETIC) 20-12.5 mg per tablet Take 2 Tabs by mouth daily. 180 Tab 1    aspirin delayed-release 81 mg tablet Take  by mouth daily.  IBUPROFEN PO Take  by mouth.       glucose blood VI test strips (ASCENSIA AUTODISC VI, ONE TOUCH ULTRA TEST VI) strip Use as directed to test blood sugar twice a day 100 Strip 6     Allergies   Allergen Reactions    Aleve [Naproxen Sodium] Itching and Hives    Naproxen Hives     Past Medical History:   Diagnosis Date    Alcohol abuse 8/22/2013    CAD (coronary artery disease)     \"cardiac stent placed 1 year ago\"    Depression     Diastolic dysfunction 4/2/4395    DMII [facility]di[facility] 2) [facility]     Hypertension     Mild concentric left ventricular hypertrophy (LVH) 7/9/2018    Mitral regurgitation 7/9/2018    Substance abuse ([facility]     Suicidal thoughts     Tricuspid regurgitation 3041    Umbilical hernia      Past Surgical History:   Procedure Laterality Date    HX BACK SURGERY      HX CORONARY STENT PLACEMENT      No stent placed     Family History   Problem Relation Age of Onset    Suicide Father     Diabetes Brother     Psychiatric Disorder Other      Social History     Tobacco Use   Smoking Status Never Smoker   Smokeless Tobacco Never Used          Review of Systems, additional:  Constitutional: negative  Eyes: negative  Respiratory: negative  Cardiovascular: positive for palpitations, dyspnea on exertion  Gastrointestinal: negative  Musculoskeletal:negative  Neurological: negative  Behvioral/Psych: negative  Endocrine: negative  ENT: negative    Objective:     Visit Vitals  /84   Pulse 80   Ht 5' 11\" (1.803 m)   Wt 216 lb (98 kg)   SpO2 97%   BMI 30.13 kg/m²     General:  alert, cooperative, no distress   Chest Wall: inspection normal - no chest wall deformities or tenderness, respiratory effort normal   Lung: clear to auscultation bilaterally   Heart:   Regular rate and rhythm with rate 66   Abdomen: Distended , nontender. + fluid wave   Extremities: extremities normal, atraumatic, no cyanosis. There was 3+ leg edema Skin: erythematous pustular rash on trunk   Neuro: alert, oriented, normal speech, no focal findings or movement disorder noted     EK19. Sinus rhythm. Normal.    Assessment/Plan:       ICD-10-CM ICD-9-CM    1. Paroxysmal atrial fibrillation (HCC), [id number] score 2, excessive risk for anticoagulants secondary to alcohol dependence. The patient is in sinus rhythm in the office today. Continue ASA. Continue Diltiazem. The patient has a possible history of obstructive sleep apnea. Will request sleep evaluation. Return in 3 months I48.0 427.31 AMB POC EKG ROUTINE W/ 12 LEADS, INTER & REP   2.  Essential hypertension, controlled in the office  today I10 401.9 lisinopril-hydroCHLOROthiazide (PRINZIDE, ZESTORETIC) 10-12.5 mg per tablet 3. Mild concentric left ventricular hypertrophy (LVH) I51.7 429.3    4. Mitral valve insufficiency, unspecified etiology, mild I34.0 424.0    5. Tricuspid valve insufficiency, unspecified etiology, mild I07.1 397.0    6. Type 2 diabetes mellitus without complication, without long-term current use of insulin (HCC) E11.9 250.00    7. Family history of premature CAD Z82.49 V17.3    8       CAD, patient reports prior coronary stenting with records pending.

## 2019-02-25 RX ORDER — METFORMIN HYDROCHLORIDE 1000 MG/1
TABLET ORAL
Qty: 180 TAB | Refills: 1 | Status: SHIPPED | OUTPATIENT
Start: 2019-02-25 | End: 2019-03-06 | Stop reason: SDUPTHER

## 2019-03-06 ENCOUNTER — HOSPITAL ENCOUNTER (OUTPATIENT)
Dept: LAB | Age: 59
Discharge: HOME | End: 2019-03-06
Payer: MEDICAID

## 2019-03-06 ENCOUNTER — OFFICE VISIT (OUTPATIENT)
Dept: FAMILY MEDICINE CLINIC | Age: 59
End: 2019-03-06

## 2019-03-06 VITALS
RESPIRATION RATE: 16 BRPM | OXYGEN SATURATION: 99 % | BODY MASS INDEX: 30.8 KG/M2 | SYSTOLIC BLOOD PRESSURE: 130 MMHG | WEIGHT: 220 LBS | HEART RATE: 90 BPM | HEIGHT: 71 IN | TEMPERATURE: 97.2 F | DIASTOLIC BLOOD PRESSURE: 88 MMHG

## 2019-03-06 DIAGNOSIS — E11.42 TYPE 2 DIABETES MELLITUS WITH DIABETIC POLYNEUROPATHY, WITHOUT LONG-TERM CURRENT USE OF INSULIN (HCC): ICD-10-CM

## 2019-03-06 DIAGNOSIS — I10 ESSENTIAL HYPERTENSION WITH GOAL BLOOD PRESSURE LESS THAN 130/80: ICD-10-CM

## 2019-03-06 DIAGNOSIS — G62.9 NEUROPATHY: Primary | ICD-10-CM

## 2019-03-06 DIAGNOSIS — I10 ESSENTIAL HYPERTENSION: ICD-10-CM

## 2019-03-06 PROCEDURE — 82570 ASSAY OF URINE CREATININE: CPT

## 2019-03-06 RX ORDER — METFORMIN HYDROCHLORIDE 1000 MG/1
TABLET ORAL
Qty: 180 TAB | Refills: 1 | Status: SHIPPED | OUTPATIENT
Start: 2019-03-06 | End: 2019-08-19 | Stop reason: SDUPTHER

## 2019-03-06 RX ORDER — GABAPENTIN 400 MG/1
CAPSULE ORAL
Qty: 90 CAP | Refills: 3 | Status: SHIPPED | OUTPATIENT
Start: 2019-03-06 | End: 2019-04-29 | Stop reason: DRUGHIGH

## 2019-03-06 RX ORDER — LISINOPRIL AND HYDROCHLOROTHIAZIDE 12.5; 2 MG/1; MG/1
2 TABLET ORAL DAILY
Qty: 180 TAB | Refills: 1 | Status: SHIPPED | OUTPATIENT
Start: 2019-03-06 | End: 2019-04-04 | Stop reason: SDUPTHER

## 2019-03-06 RX ORDER — GLIMEPIRIDE 2 MG/1
TABLET ORAL
Qty: 90 TAB | Refills: 1 | Status: SHIPPED | OUTPATIENT
Start: 2019-03-06 | End: 2019-04-04 | Stop reason: SDUPTHER

## 2019-03-06 RX ORDER — FERROUS SULFATE 325(65) MG
TABLET, DELAYED RELEASE (ENTERIC COATED) ORAL
Qty: 90 TAB | Refills: 1 | Status: SHIPPED | OUTPATIENT
Start: 2019-03-06 | End: 2019-08-19 | Stop reason: SDUPTHER

## 2019-03-06 RX ORDER — DILTIAZEM HYDROCHLORIDE 120 MG/1
120 CAPSULE, COATED, EXTENDED RELEASE ORAL DAILY
Qty: 90 CAP | Refills: 3 | Status: SHIPPED | OUTPATIENT
Start: 2019-03-06 | End: 2019-08-19 | Stop reason: SDUPTHER

## 2019-03-06 NOTE — PROGRESS NOTES
Chief Complaint   Patient presents with    Follow Up Chronic Condition     DM2, HTN, Neuropathy and Depression    Numbness     both hands     1. Have you been to the ER, urgent care clinic since your last visit? Hospitalized since your last visit? No    2. Have you seen or consulted any other health care providers outside of the [address] [place] [address] since your last visit?  Saw Cardiologist on 2/14/2019    Visit Vitals  /88   Pulse 90   Temp 97.2 °F (36.2 °C) (Oral)   Resp 16   Ht 5' 11\" (1.803 m)   Wt 220 lb (99.8 kg)   SpO2 99%   BMI 30.68 kg/m²

## 2019-03-06 NOTE — PROGRESS NOTES
Tariq Bundy    CC: ***    HPI:     HTN, Improving:  - Not at goal of 130/80  - Mircoalbumin/Cr ratio suggestive of possible microalbuminuria.  Needs repeat testing in 2-5 months  - Advised to get blood work done  - Will plan to discuss adjustment at F/U if BP remains not at goal  - F/U in one month        Neuropathy:  - worsening  Hands and feet  -Hand asre assoiced with pain  -Gabapentin has not be  - Likely 2/2 diabetes, although deficiencies 2/2 to alcohol use possible  - Will check levels of folate and vitamin B 1, 6, & 12  - Will stop amitriptyline and do trial on doxepin  - F/U in one month  -        Depression/Insomnia:  - Will stop amitriptyline and do trial on doxepin  - Given prescription of Seroquel as precaution, if he choose not to continue doxepin and to resume prior regimen  -Seroqoul has mmood controll  - F/U in one month        ROS: Positive items marked in RED  CON: fever, chills  Cardiovascular: palpitations, CP  Resp: SOB, cough  GI: nausea, vomiting, diarrhea  : dysuria, hematuria  Lymph/Heme: swollen/enlarged lymph nodes, tender/painful lymph nodes, easy bruising/bleeding  SKIN: rash, itching  Eyes: itchy eyes, watery eyes, red eyes  ENT: rhinorrhea, sneezing, post nasal drip, sore throat  MS: arthralgias, myalgias  Neuro: numbness, weakness  Psych: depression, anxiety  Endo: polyuria, polydipsia, heat intolerance, cold intolerance      Past Medical History:   Diagnosis Date    Alcohol abuse 8/22/2013    CAD (coronary artery disease)     \"cardiac stent placed 1 year ago\"    Depression     Diastolic dysfunction 6/8/4343    DMII (diabetes mellitus, type 2) ([address]     Hypertension     Mild concentric left ventricular hypertrophy (LVH) 7/9/2018    Mitral regurgitation 7/9/2018    Substance abuse ([address]     Suicidal thoughts     Tricuspid regurgitation 7/0/8141    Umbilical hernia        Past Surgical History:   Procedure Laterality Date    HX BACK SURGERY  2014    HX CORONARY STENT PLACEMENT  2012    No stent placed       Family History   Problem Relation Age of Onset    Suicide Father     Diabetes Brother     Psychiatric Disorder Other        Social History     Socioeconomic History    Marital status: LEGALLY      Spouse name: Not on file    Number of children: Not on file    Years of education: Not on file    Highest education level: Not on file   Tobacco Use    Smoking status: Never Smoker    Smokeless tobacco: Never Used   Substance and Sexual Activity    Alcohol use: Yes     Alcohol/week: 37.8 oz     Types: 63 Cans of beer per week    Drug use: No    Sexual activity: Not Currently   Social History Narrative     X 2. Lives with wife and 21year old son. 6year old son was taken away by . Unemployed        Allergies   Allergen Reactions    Aleve [Naproxen Sodium] Itching and Hives    Naproxen Hives         Current Outpatient Medications:     metFORMIN (GLUCOPHAGE) 1,000 mg tablet, TAKE 1 TABLET BY MOUTH TWICE A DAY WITH MEALS, Disp: 180 Tab, Rfl: 1    magnesium oxide (MAG-OX) 400 mg tablet, Take 1 Tab by mouth daily. , Disp: 90 Tab, Rfl: 2    dilTIAZem CD (CARDIZEM CD) 120 mg ER capsule, Take 1 Cap by mouth daily. , Disp: 90 Cap, Rfl: 3    ferrous sulfate (IRON) 325 mg (65 mg iron) EC tablet, take 1 tablet once daily, Disp: 90 Tab, Rfl: 1    QUEtiapine SR (SEROQUEL XR) 300 mg sr tablet, Take 1 Tab by mouth nightly., Disp: 90 Tab, Rfl: 0    gabapentin (NEURONTIN) 400 mg capsule, take 1 capsule by mouth three times a day, Disp: 90 Cap, Rfl: 3    glimepiride (AMARYL) 2 mg tablet, take 1 tablet by mouth every morning, Disp: 90 Tab, Rfl: 1    lisinopril-hydroCHLOROthiazide (PRINZIDE, ZESTORETIC) 20-12.5 mg per tablet, Take 2 Tabs by mouth daily. , Disp: 180 Tab, Rfl: 1    aspirin delayed-release 81 mg tablet, Take  by mouth daily. , Disp: , Rfl:     IBUPROFEN PO, Take  by mouth., Disp: , Rfl:     glucose blood VI test strips (ASCENSIA AUTODISC VI, ONE TOUCH ULTRA TEST VI) strip, Use as directed to test blood sugar twice a day, Disp: 100 Strip, Rfl: 6    doxepin (SINEQUAN) 100 mg capsule, Take 1 Cap by mouth nightly., Disp: 90 Cap, Rfl: 0    Physical Exam:      /88   Pulse 90   Temp 97.2 °F (36.2 °C) (Oral)   Resp 16   Ht 5' 11\" (1.803 m)   Wt 220 lb (99.8 kg)   SpO2 99%   BMI 30.68 kg/m²     General:  WD, WN, NAD, conversant  Eyes: sclera clear bilaterally, no discharge noted, eyelids normal in appearance  HENT: NCAT, nasal turbinates, oropharynx clear, MMM  Neck: supple, no lymphadenopathy  Lungs: CTAB, normal respiratory effort and rate  CV: RRR, no MRGs  ABD: soft, non-tender, non-distended, normal bowel sounds  Ext: no peripheral edema or digital cyanosis noted  Skin: normal temperature, turgor, color, and texture  Psych: alert and oriented to person, place and situation, normal affect  Neuro: speech normal, moving all extremities, gait normal    Results for Emmanuel Atwood (MRN [id number]):   Ref.  Range 10/5/2018 08:28   Sodium Latest Ref Range: 136 - 145 mmol/L 136   Potassium Latest Ref Range: 3.5 - 5.5 mmol/L 4.0   Chloride Latest Ref Range: 100 - 108 mmol/L 102   CO2 Latest Ref Range: 21 - 32 mmol/L 16 (L)   Anion gap Latest Ref Range: 3.0 - 18 mmol/L 18   Glucose Latest Ref Range: 74 - 99 mg/dL 315 (H)   BUN Latest Ref Range: 7.0 - 18 MG/DL 13   Creatinine Latest Ref Range: 0.6 - 1.3 MG/DL 1.00   BUN/Creatinine ratio Latest Ref Range: 12 - 20   13   Calcium Latest Ref Range: 8.5 - 10.1 MG/DL 8.5   GFR est non-AA Latest Ref Range: >60 ml/min/1.73m2 >60   GFR est AA Latest Ref Range: >60 ml/min/1.73m2 >60   Bilirubin, total Latest Ref Range: 0.2 - 1.0 MG/DL 0.6   Protein, total Latest Ref Range: 6.4 - 8.2 g/dL 7.6   Albumin Latest Ref Range: 3.4 - 5.0 g/dL 3.6   Globulin Latest Ref Range: 2.0 - 4.0 g/dL 4.0   A-G Ratio Latest Ref Range: 0.8 - 1.7   0.9   ALT (SGPT) Latest Ref Range: 16 - 61 U/L 85 (H)   AST Latest Ref Range: 15 - 37 U/L 88 (H)   Alk.  phosphatase Latest Ref Range: 45 - 117 U/L 118 (H)   Vitamin B12 Latest Ref Range: 211 - 911 pg/mL 367   Folate Latest Ref Range: 3.10 - 17.50 ng/mL 13.5   VITAMIN B1, WHOLE BLOOD Unknown Rpt   VITAMIN B6 Unknown Rpt       Assessment/Plan         Health Maintenance:  James Dee MD  3/6/2019, 12:49 PM

## 2019-03-07 LAB
CREAT UR-MCNC: <13 MG/DL (ref 30–125)
MICROALBUMIN UR-MCNC: 2.45 MG/DL (ref 0–3)
MICROALBUMIN/CREAT UR-RTO: ABNORMAL MG/G (ref 0–30)

## 2019-03-21 NOTE — PROGRESS NOTES
[facility]    CC: F/U of neuropathy    HPI:     Neuropathy:  -Got requested blood work  -Symptoms in hands and feet have been worsening  -Neuropathy in hands is associated with pain  -Doxepin has not been effective  -Feels gabapentin helps some      HTN:  -Got requested blood work  -Does not check BP at home  -Not following any exercise regimen  -Talking BP medication as prescribed  -Denies any side effects or issues with his medication       DMII:  -Got requested blood work  -Taking diabetic medication as prescribed  -Checks blood sugar at home.  No log brought in for review.   -Taking his diabetic medication as prescribed  -Denies any side effects or issues with his medication        ROS: Positive items marked in RED  CON: fever, chills  Cardiovascular: palpitations, CP  Resp: SOB, cough  GI: nausea, vomiting, diarrhea  : dysuria, hematuria      Past Medical History:   Diagnosis Date    Alcohol abuse 8/22/2013    CAD (coronary artery disease)     \"cardiac stent placed 1 year ago\"    Depression     Diastolic dysfunction 6/9/2067    [facility]II [facility]di[facility] mellitus, type 2) [facility]     Hypertension     Mild concentric left ventricular hypertrophy (LVH) 7/9/2018    Mitral regurgitation 7/9/2018    Substance abuse ([facility]     Suicidal thoughts     Tricuspid regurgitation 8/1/6531    Umbilical hernia        Past Surgical History:   Procedure Laterality Date    HX BACK SURGERY  2014    HX CORONARY STENT PLACEMENT  2012    No stent placed       Family History   Problem Relation Age of Onset    Suicide Father     Diabetes Brother     Psychiatric Disorder Other        Social History     Socioeconomic History    Marital status: LEGALLY      Spouse name: Not on file    Number of children: Not on file    Years of education: Not on file    Highest education level: Not on file   Tobacco Use    Smoking status: Never Smoker    Smokeless tobacco: Never Used   Substance and Sexual Activity    Alcohol use: Yes     Alcohol/week: 37.8 oz     Types: 63 Cans of beer per week    Drug use: No    Sexual activity: Not Currently   Social History Narrative     X 2. Lives with wife and 21year old son. 6year old son was taken away by . Unemployed        Allergies   Allergen Reactions    Aleve [Naproxen Sodium] Itching and Hives    Naproxen Hives         Current Outpatient Medications:     metFORMIN (GLUCOPHAGE) 1,000 mg tablet, TAKE 1 TABLET BY MOUTH TWICE A DAY WITH MEALS, Disp: 180 Tab, Rfl: 1    magnesium oxide (MAG-OX) 400 mg tablet, Take 1 Tab by mouth daily. , Disp: 90 Tab, Rfl: 2    dilTIAZem CD (CARDIZEM CD) 120 mg ER capsule, Take 1 Cap by mouth daily. , Disp: 90 Cap, Rfl: 3    ferrous sulfate (IRON) 325 mg (65 mg iron) EC tablet, take 1 tablet once daily, Disp: 90 Tab, Rfl: 1    QUEtiapine SR (SEROQUEL XR) 300 mg sr tablet, Take 1 Tab by mouth nightly., Disp: 90 Tab, Rfl: 0    gabapentin (NEURONTIN) 400 mg capsule, take 1 capsule by mouth three times a day, Disp: 90 Cap, Rfl: 3    glimepiride (AMARYL) 2 mg tablet, take 1 tablet by mouth every morning, Disp: 90 Tab, Rfl: 1    lisinopril-hydroCHLOROthiazide (PRINZIDE, ZESTORETIC) 20-12.5 mg per tablet, Take 2 Tabs by mouth daily. , Disp: 180 Tab, Rfl: 1    aspirin delayed-release 81 mg tablet, Take  by mouth daily. , Disp: , Rfl:     IBUPROFEN PO, Take  by mouth., Disp: , Rfl:     glucose blood VI test strips (ASCENSIA AUTODISC VI, ONE TOUCH ULTRA TEST VI) strip, Use as directed to test blood sugar twice a day, Disp: 100 Strip, Rfl: 6    doxepin (SINEQUAN) 100 mg capsule, Take 1 Cap by mouth nightly., Disp: 90 Cap, Rfl: 0    Physical Exam:      /88   Pulse 90   Temp 97.2 °F (36.2 °C) (Oral)   Resp 16   Ht 5' 11\" (1.803 m)   Wt 220 lb (99.8 kg)   SpO2 99%   BMI 30.68 kg/m²     General:  Obese habitus, NAD, conversant  Eyes: sclera clear bilaterally, no discharge noted, eyelids normal in appearance  HENT: NCAT  Neck: supple, no lymphadenopathy  Lungs: CTAB, normal respiratory effort and rate  CV: RRR, no MRGs  ABD: soft, non-tender, non-distended, normal bowel sounds  Skin: normal temperature, turgor, color, and texture  Psych: alert and oriented to person, place and situation, normal affect  Neuro: speech normal, moving all extremities, gait normal    Results for Jackson Combs (MRN [id number]):   Ref. Range 10/5/2018 08:28   Sodium Latest Ref Range: 136 - 145 mmol/L 136   Potassium Latest Ref Range: 3.5 - 5.5 mmol/L 4.0   Chloride Latest Ref Range: 100 - 108 mmol/L 102   CO2 Latest Ref Range: 21 - 32 mmol/L 16 (L)   Anion gap Latest Ref Range: 3.0 - 18 mmol/L 18   Glucose Latest Ref Range: 74 - 99 mg/dL 315 (H)   BUN Latest Ref Range: 7.0 - 18 MG/DL 13   Creatinine Latest Ref Range: 0.6 - 1.3 MG/DL 1.00   BUN/Creatinine ratio Latest Ref Range: 12 - 20   13   Calcium Latest Ref Range: 8.5 - 10.1 MG/DL 8.5   GFR est non-AA Latest Ref Range: >60 ml/min/1.73m2 >60   GFR est AA Latest Ref Range: >60 ml/min/1.73m2 >60   Bilirubin, total Latest Ref Range: 0.2 - 1.0 MG/DL 0.6   Protein, total Latest Ref Range: 6.4 - 8.2 g/dL 7.6   Albumin Latest Ref Range: 3.4 - 5.0 g/dL 3.6   Globulin Latest Ref Range: 2.0 - 4.0 g/dL 4.0   A-G Ratio Latest Ref Range: 0.8 - 1.7   0.9   ALT (SGPT) Latest Ref Range: 16 - 61 U/L 85 (H)   AST Latest Ref Range: 15 - 37 U/L 88 (H)   Alk. phosphatase Latest Ref Range: 45 - 117 U/L 118 (H)   Vitamin B12 Latest Ref Range: 211 - 911 pg/mL 367   Folate Latest Ref Range: 3.10 - 17.50 ng/mL 13.5   VITAMIN B1, WHOLE BLOOD Unknown Rpt   VITAMIN B6 Unknown Rpt       Assessment/Plan     Neuropathy:  -Etiology unclear.  Possible related to diabetes, although the fact that he had this issue when HGBA1c was in prediabetic range makes it unlikely  -Will continue current gabapentin regimen  -Referred to neurology for further evaluation  -F/U in one month      DMII:  -Has been previously very well controlled, although recent non fasting blood sugar notably high  -Repeat urine Mircoalbumin/Cr ratio ordered  -Plan to check HGBA1c at next appointment  -F/U in one month      HTN:  -Not at goal of <130/80  -Patient counseled on BP goal due to diabetes   -BP maybe exacerbated by current pain  -Repeat urine Mircoalbumin/Cr ratio ordered  -Plan to discuss increasing diltiazem dose at next visit, if BP remains not at goal  -Need to check fasting lipid   -F/U in one month        Tomas Gan MD  3/6/2019, 12:49 PM

## 2019-04-03 DIAGNOSIS — I10 ESSENTIAL HYPERTENSION: ICD-10-CM

## 2019-04-03 DIAGNOSIS — E11.9 TYPE 2 DIABETES MELLITUS WITHOUT COMPLICATION, WITHOUT LONG-TERM CURRENT USE OF INSULIN (HCC): ICD-10-CM

## 2019-04-04 RX ORDER — LISINOPRIL AND HYDROCHLOROTHIAZIDE 12.5; 2 MG/1; MG/1
TABLET ORAL
Qty: 180 TAB | Refills: 1 | Status: SHIPPED | OUTPATIENT
Start: 2019-04-04 | End: 2019-08-19 | Stop reason: SDUPTHER

## 2019-04-04 RX ORDER — GLIMEPIRIDE 2 MG/1
TABLET ORAL
Qty: 90 TAB | Refills: 1 | Status: SHIPPED | OUTPATIENT
Start: 2019-04-04 | End: 2020-01-23 | Stop reason: DRUGHIGH

## 2019-04-29 ENCOUNTER — OFFICE VISIT (OUTPATIENT)
Dept: FAMILY MEDICINE CLINIC | Age: 59
End: 2019-04-29

## 2019-04-29 VITALS
DIASTOLIC BLOOD PRESSURE: 79 MMHG | HEIGHT: 71 IN | RESPIRATION RATE: 15 BRPM | HEART RATE: 84 BPM | SYSTOLIC BLOOD PRESSURE: 118 MMHG | OXYGEN SATURATION: 97 % | BODY MASS INDEX: 29.82 KG/M2 | WEIGHT: 213 LBS | TEMPERATURE: 97.5 F

## 2019-04-29 DIAGNOSIS — G47.00 INSOMNIA, UNSPECIFIED TYPE: ICD-10-CM

## 2019-04-29 DIAGNOSIS — I10 ESSENTIAL HYPERTENSION WITH GOAL BLOOD PRESSURE LESS THAN 130/80: ICD-10-CM

## 2019-04-29 DIAGNOSIS — F32.A DEPRESSION, UNSPECIFIED DEPRESSION TYPE: ICD-10-CM

## 2019-04-29 DIAGNOSIS — G62.9 NEUROPATHY: ICD-10-CM

## 2019-04-29 DIAGNOSIS — E11.9 TYPE 2 DIABETES MELLITUS WITHOUT COMPLICATION, WITHOUT LONG-TERM CURRENT USE OF INSULIN (HCC): Primary | ICD-10-CM

## 2019-04-29 LAB — HBA1C MFR BLD HPLC: 5.5 %

## 2019-04-29 RX ORDER — QUETIAPINE 300 MG/1
600 TABLET, FILM COATED, EXTENDED RELEASE ORAL
Qty: 180 TAB | Refills: 0 | Status: SHIPPED | OUTPATIENT
Start: 2019-04-29 | End: 2019-08-19 | Stop reason: SDUPTHER

## 2019-04-29 RX ORDER — GABAPENTIN 400 MG/1
800 CAPSULE ORAL 3 TIMES DAILY
Qty: 60 CAP | Refills: 2 | Status: SHIPPED | OUTPATIENT
Start: 2019-04-29 | End: 2019-06-24 | Stop reason: SDUPTHER

## 2019-04-29 NOTE — PROGRESS NOTES
Chief Complaint   Patient presents with    Follow-up     HTN, DM, Neuropathy     1. Have you been to the ER, urgent care clinic since your last visit? Hospitalized since your last visit? No    2. Have you seen or consulted any other health care providers outside of the [address] [place] [address] since your last visit? Include any pap smears or colon screening.  No

## 2019-04-29 NOTE — PROGRESS NOTES
[facility]    CC: F/U for chronic disease management    HPI:     DMII:  -Got requested lab work  -Taking diabetic medication as prescribed  -Denies any side effects to the medication  -Checks blood sugar at home.  No log brought in for review  -Reports FBS has been in low 100's      HTN:  -Got requested lab work  -Taking BP medication as prescribed  -Denies any side effects to the medication  -Does nit check BP at home      Depression/Insomnia:  -Taking 400 - 600 mg of Seroquel nightly  -Denies any depression  -Reports medication work well to help him sleep  -States the only side effect he has to the medication is sleepiness      Neuropathy:  -Got requested blood work  -Unchanged from last appointment  -Taking gabapentin as prescribed  -Denies any side effects to the medication  -Reports it helps to dull the pain some  -Has not seen neurologist yet      ROS: Positive items marked in RED  CON: fever, chills  Cardiovascular: palpitations, CP  Resp: SOB, cough  GI: nausea, vomiting, diarrhea  : dysuria, hematuria      Past Medical History:   Diagnosis Date    Alcohol abuse 8/22/2013    CAD (coronary artery disease)     \"cardiac stent placed 1 year ago\"    Depression     Diastolic dysfunction 4/0/1883    [facility], [facility]     Hypertension     Mild concentric left ventricular hypertrophy (LVH) 7/9/2018    Mitral regurgitation 7/9/2018    Substance abuse ([facility]     Suicidal thoughts     Tricuspid regurgitation 4/2/6156    Umbilical hernia        Past Surgical History:   Procedure Laterality Date    HX BACK SURGERY  2014    HX CORONARY STENT PLACEMENT  2012    No stent placed       Family History   Problem Relation Age of Onset    Suicide Father     Diabetes Brother     Psychiatric Disorder Other        Social History     Socioeconomic History    Marital status: LEGALLY      Spouse name: Not on file    Number of children: Not on file    Years of education: Not on file    Highest education level: Not on file   Tobacco Use    Smoking status: Never Smoker    Smokeless tobacco: Never Used   Substance and Sexual Activity    Alcohol use: Yes     Alcohol/week: 37.8 oz     Types: 63 Cans of beer per week    Drug use: No     Types: Benzodiazepines    Sexual activity: Not Currently   Social History Narrative     X 2. Lives with wife and 21year old son. 6year old son was taken away by . Unemployed        Allergies   Allergen Reactions    Aleve [Naproxen Sodium] Itching and Hives    Naproxen Hives         Current Outpatient Medications:     lisinopril-hydroCHLOROthiazide (PRINZIDE, ZESTORETIC) 20-12.5 mg per tablet, take 2 tablets by mouth once daily, Disp: 180 Tab, Rfl: 1    glimepiride (AMARYL) 2 mg tablet, take 1 tablet by mouth every morning, Disp: 90 Tab, Rfl: 1    metFORMIN (GLUCOPHAGE) 1,000 mg tablet, TAKE 1 TABLET BY MOUTH TWICE A DAY WITH MEALS, Disp: 180 Tab, Rfl: 1    gabapentin (NEURONTIN) 400 mg capsule, take 1 capsule by mouth three times a day, Disp: 90 Cap, Rfl: 3    ferrous sulfate (IRON) 325 mg (65 mg iron) EC tablet, take 1 tablet once daily, Disp: 90 Tab, Rfl: 1    dilTIAZem CD (CARDIZEM CD) 120 mg ER capsule, Take 1 Cap by mouth daily. , Disp: 90 Cap, Rfl: 3    magnesium oxide (MAG-OX) 400 mg tablet, Take 1 Tab by mouth daily. , Disp: 90 Tab, Rfl: 2    QUEtiapine SR (SEROQUEL XR) 300 mg sr tablet, Take 1 Tab by mouth nightly., Disp: 90 Tab, Rfl: 0    aspirin delayed-release 81 mg tablet, Take  by mouth daily. , Disp: , Rfl:     IBUPROFEN PO, Take  by mouth., Disp: , Rfl:     glucose blood VI test strips (ASCENSIA AUTODISC VI, ONE TOUCH ULTRA TEST VI) strip, Use as directed to test blood sugar twice a day, Disp: 100 Strip, Rfl: 6    Physical Exam:      /79   Pulse 84   Temp 97.5 °F (36.4 °C) (Oral)   Resp 15   Ht 5' 11\" (1.803 m)   Wt 213 lb (96.6 kg)   SpO2 97%   BMI 29.71 kg/m²     General: obese habitus, NAD, conversant  Eyes: sclera clear bilaterally, no discharge noted, eyelids normal in appearance  HENT: NCAT  Lungs: CTAB, normal respiratory effort and rate  CV: RRR, no MRGs  ABD: soft, non-tender, non-distended, normal bowel sounds  Ext: no peripheral edema or digital cyanosis noted  Skin: normal temperature, turgor, color, and texture  Psych: alert and oriented to person, place and situation, normal affect  Neuro: speech normal, moving all extremities, gait normal    Results for General Merry (MRN [id number]):   Ref. Range 3/6/2019 15:30 4/5/2019 15:23 4/29/2019 15:30   Hemoglobin A1c (POC) Latest Units: %   5.5   Creatinine, urine Latest Ref Range: 30 - 125 mg/dL <13.00 (L)     Microalbumin,urine random Latest Ref Range: 0 - 3.0 MG/DL 2.45     Microalbumin/Creat. Ratio Latest Ref Range: 0 - 30 mg/g Cannot calculate . ..      TSH Latest Ref Range: 0.358 - 3.740 uIU/mL  1.460    Vitamin B12 Latest Ref Range: 193 - 986 pg/ml  381        Assessment/Plan     DMII, Well Controlled:  -HGBA1c at goal of <7%  -Will continue current diabetes medication regimen  -Referred to ophthalmology for diabetic eye exam  -Fasting lipid panel ordered  -F/U in 4 months      HTN, Well Controlled:  -BP at goal of <130/80  -Will continue current BP medication regimen  -Fasting lipid panel ordered  -F/U in 4 months      Depression/Insomnia, Well Controlled:  -Will formally start on 600 mg of Seroquel nightly  -F/U in 4 months      Neuropathy, Unchanged:  -Etiology unclear  -Advised patient to call neurology office to schedule his appointment  -Will increase gabapentin dose to 800 mg TID  -F/U in 4 months        Tammy Arcos MD  4/29/2019, 3:38 PM

## 2019-06-07 ENCOUNTER — OFFICE VISIT (OUTPATIENT)
Dept: NEUROLOGY | Age: 59
End: 2019-06-07

## 2019-06-07 VITALS
TEMPERATURE: 98 F | BODY MASS INDEX: 30.04 KG/M2 | RESPIRATION RATE: 20 BRPM | DIASTOLIC BLOOD PRESSURE: 70 MMHG | OXYGEN SATURATION: 98 % | HEIGHT: 71 IN | SYSTOLIC BLOOD PRESSURE: 142 MMHG | WEIGHT: 214.6 LBS | HEART RATE: 61 BPM

## 2019-06-07 DIAGNOSIS — E11.40 TYPE 2 DIABETES MELLITUS WITH DIABETIC NEUROPATHY, WITHOUT LONG-TERM CURRENT USE OF INSULIN (HCC): ICD-10-CM

## 2019-06-07 DIAGNOSIS — G56.03 BILATERAL CARPAL TUNNEL SYNDROME: ICD-10-CM

## 2019-06-07 DIAGNOSIS — F10.29 ALCOHOL DEPENDENCE WITH UNSPECIFIED ALCOHOL-INDUCED DISORDER (HCC): Primary | ICD-10-CM

## 2019-06-07 NOTE — LETTER
6/7/19 Patient: Geno Kearns YOB: 1960 Date of Visit: 6/7/2019 Meron Bose MD 
[facility] [phone] VIA In Basket Dear Meron Bose MD, Thank you for referring Mr. Harvinder Cheek to [facility] for evaluation. My notes for this consultation are attached. If you have questions, please do not hesitate to call me. I look forward to following your patient along with you.  
 
 
Sincerely, 
 
Nathen Wheat MD

## 2019-06-07 NOTE — PROGRESS NOTES
Marine Chirinos is a 63 y.o., right handed male, with an established history of diabetes for at least 8 years, hypertension, who comes in complaining of pain in the hands. He says the pain, which is described as a burning, will wake him up in the middle of the night. He also has lack of sensation in the hands and left foot, but what's really bothering his is the burning of the hands. There's also intermittent tingling of the hands. He's been on Neurontin that's done nothing at this time. He additionally has pain in the left side of his neck that radiates to the shoulder only. This can be worse when he uses the left arm for lifting, but also can bother him when he sleeps or lifts the hand/arm over his head. Social History; he's , lives with his sister-in-law. Drinks about 7-8 beers every night. Has been doing this for many years. No tobacco or illicit drugs. Works doing body work on motor vehicles. Family History; mother  from heart disease, had hypertension. Father  from heart disease, had hypertension. Sibling  from heart attack, hypertension. Current Outpatient Medications   Medication Sig Dispense Refill    gabapentin (NEURONTIN) 400 mg capsule Take 2 Caps by mouth three (3) times daily. 60 Cap 2    QUEtiapine SR (SEROQUEL XR) 300 mg sr tablet Take 2 Tabs by mouth nightly. 180 Tab 0    lisinopril-hydroCHLOROthiazide (PRINZIDE, ZESTORETIC) 20-12.5 mg per tablet take 2 tablets by mouth once daily 180 Tab 1    glimepiride (AMARYL) 2 mg tablet take 1 tablet by mouth every morning 90 Tab 1    metFORMIN (GLUCOPHAGE) 1,000 mg tablet TAKE 1 TABLET BY MOUTH TWICE A DAY WITH MEALS 180 Tab 1    ferrous sulfate (IRON) 325 mg (65 mg iron) EC tablet take 1 tablet once daily 90 Tab 1    dilTIAZem CD (CARDIZEM CD) 120 mg ER capsule Take 1 Cap by mouth daily. 90 Cap 3    magnesium oxide (MAG-OX) 400 mg tablet Take 1 Tab by mouth daily.  90 Tab 2    aspirin delayed-release 81 mg tablet Take  by mouth daily.  IBUPROFEN PO Take  by mouth.       glucose blood VI test strips (ASCENSIA AUTODISC VI, ONE TOUCH ULTRA TEST VI) strip Use as directed to test blood sugar twice a day 100 Strip 6       Past Medical History:   Diagnosis Date    Alcohol abuse 8/22/2013    CAD (coronary artery disease)     \"cardiac stent placed 1 year ago\"    Depression     Diastolic dysfunction 5/9/2502    DMII [facility], [facility] 2) [facility]     Hypertension     Mild concentric left ventricular hypertrophy (LVH) 7/9/2018    Mitral regurgitation 7/9/2018    Substance abuse ([address]     Suicidal thoughts     Tricuspid regurgitation 7/5/3254    Umbilical hernia        Past Surgical History:   Procedure Laterality Date    HX BACK SURGERY  2014    HX CORONARY STENT PLACEMENT  2012    No stent placed       Allergies   Allergen Reactions    Aleve [Naproxen Sodium] Itching and Hives    Naproxen Hives       Patient Active Problem List   Diagnosis Code    Anxiety and depression F41.9, F32.9    Alcohol abuse F10.10    H/O lumbosacral spine surgery Z98.890    Chronic low back pain with sciatica M54.40, G89.29    H/O coronary angioplasty Z98.61    Depression F32.9    Suicidal thoughts R45.851    Pancytopenia ([address] X94.957    Essential hypertension with goal blood pressure less than 130/80 I10    Type 2 diabetes mellitus without complication, without long-term current use of insulin ([facility]) E11.9    CAD (coronary artery disease) I25.10    Tachycardia R00.0    Atrial fibrillation ([facility]) I48.91    Mild concentric left ventricular hypertrophy (LVH) L96.8    Diastolic dysfunction L04.48    Tricuspid regurgitation I07.1    Mitral regurgitation I34.0    Alcohol dependence ([address] F10.20    Family history of premature CAD Z82.49         Review of Systems:   As above otherwise 11 point review of systems negative including;   Constitutional no fever or chills  Skin denies rash or itching  HENT  Denies tinnitus, hearing lose  Eyes denies diplopia vision lose  Respiratory denies shortness of breath  Cardiovascular denies chest pain, dyspnea on exertion  Gastrointestinal denies nausea, vomiting, diarrhea, constipation  Genitourinary denies incontinence  Musculoskeletal denies joint pain or swelling  Endocrine denies weight change  Hematology denies easy bruising or bleeding   Neurological as above in HPI      PHYSICAL EXAMINATION:      VITAL SIGNS:    Visit Vitals  /70 (BP 1 Location: Left arm, BP Patient Position: Sitting)   Pulse 61   Temp 98 °F (36.7 °C) (Oral)   Resp 20   Ht 5' 11\" (1.803 m)   Wt 97.3 kg (214 lb 9.6 oz)   SpO2 98%   BMI 29.93 kg/m²       GENERAL: The patient is well developed, well nourished, and in no apparent distress. EXTREMITIES: No clubbing, cyanosis, or edema is identified. Pulses 2+ and symmetrical.  Muscle tone is normal.  HEAD:   Ear, nose, and throat appear to be without trauma. The patient is normocephalic. NEUROLOGIC EXAMINATION    MENTAL STATUS: The patient is awake, alert, and oriented x 4. Fund of knowledge is adequate. Speech is fluent and memory appears to be intact, both long and short term. CRANIAL NERVES: II - Visual fields are full to confrontation. Funduscopic examination reveals flat disks bilaterally. Pupils are both 3 mm and briskly reactive to light and accommodation. III, IV, VI - Extraocular movements are intact and there is no nystagmus. V - Facial sensation is intact to pinprick and light touch. VII - Face is symmetrical.   VIII - Hearing is present. IX, X, XII- Palate rises symmetrically. Gag is present. Tongue is in the midline. XI - Shoulder shrugging and head turning intact  MOTOR:  The patient is 5/5 in all four limbs without any drift. Fine finger movements are symmetrical.  Isolated motor group testing reveals no focal abnormalities.   Tone is normal.  Sensory examination reveals decrease to pin and touch in the median nerve distribution bilaterally and up to the ankle on the left foot. Reflexes are trace and symmetrical. Plantars are down going. Cerebellar examination reveals no gross ataxia or dysmetria. Gait is normal and the patient can tandem walk without any difficulty. Tinel's sign is strongly positive bilaterally. CBC:   Lab Results   Component Value Date/Time    WBC 2.1 (L) 07/05/2018 04:40 AM    RBC 4.07 07/05/2018 04:40 AM    HGB 12.9 07/05/2018 04:40 AM    HCT 37.2 07/05/2018 04:40 AM    PLATELET 46 (LL) 68/85/1752 04:40 AM     BMP:   Lab Results   Component Value Date/Time    Glucose 315 (H) 10/05/2018 08:28 AM    Sodium 136 10/05/2018 08:28 AM    Potassium 4.0 10/05/2018 08:28 AM    Chloride 102 10/05/2018 08:28 AM    CO2 16 (L) 10/05/2018 08:28 AM    BUN 13 10/05/2018 08:28 AM    Creatinine 1.00 10/05/2018 08:28 AM    Calcium 8.5 10/05/2018 08:28 AM     CMP:   Lab Results   Component Value Date/Time    Glucose 315 (H) 10/05/2018 08:28 AM    Sodium 136 10/05/2018 08:28 AM    Potassium 4.0 10/05/2018 08:28 AM    Chloride 102 10/05/2018 08:28 AM    CO2 16 (L) 10/05/2018 08:28 AM    BUN 13 10/05/2018 08:28 AM    Creatinine 1.00 10/05/2018 08:28 AM    Calcium 8.5 10/05/2018 08:28 AM    Anion gap 18 10/05/2018 08:28 AM    BUN/Creatinine ratio 13 10/05/2018 08:28 AM    Alk.  phosphatase 118 (H) 10/05/2018 08:28 AM    Protein, total 7.6 10/05/2018 08:28 AM    Albumin 3.6 10/05/2018 08:28 AM    Globulin 4.0 10/05/2018 08:28 AM    A-G Ratio 0.9 10/05/2018 08:28 AM     Coagulation:   Lab Results   Component Value Date/Time    Prothrombin time 13.9 (H) 07/03/2018 09:22 PM    INR 1.2 (H) 07/03/2018 09:22 PM    aPTT 26 03/14/2018 09:53 AM     Cardiac markers: No results found for: CPK, CKND1, KIERRA     AMB POC HEMOGLOBIN A1C [[id number]] (Order [id number])   Point of Care Testing   Date: 4/29/2019 Department: [facility] Ordering/Authorizing: Farzad Nelson MD   Component Value Flag Ref Range Units Status   Hemoglobin A1c (POC) 5.5 % Final       Impression: Patient with definitive neuropathic symptomatology who has risk factors including alcohol abuse, diabetes, and probable carpal tunnel syndrome. I think that he probably has a combination of all 3 etiologies for his neuropathic symptoms. Without question he has significant signs and symptoms of carpal tunnel syndrome. 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 Also fairly convinced that his alcohol abuse the point where he is drinking greater than a sixpack every day for the last several decades, there is a significant part neuropathic symptoms. He also has diabetes which is not helping the situation. Plan: At this juncture, to get an EMG of his upper extremities and his left foot. Depending upon the results of that I may send him for a hand surgeon evaluation. I told him that he really needs to stop drinking but he insists that this is the only way he gets to sleep. I also told him to start to taper the Neurontin which does not seem to be helping at this juncture. No changes in medications will be made since he is on so much medication to begin with. Follow-up visit here in about 4 weeks. PLEASE NOTE:   This document has been produced using voice recognition software. Unrecognized errors in transcription may be present.

## 2019-06-07 NOTE — PROGRESS NOTES
Alfonso Lazo is a 63 y.o. male new patient in today to discuss neuropathy; referred by Latricia Connor MD.    Learning assessment previously completed 2/14/2018; primary language is English.

## 2019-06-19 ENCOUNTER — OFFICE VISIT (OUTPATIENT)
Dept: NEUROLOGY | Age: 59
End: 2019-06-19

## 2019-06-19 DIAGNOSIS — G56.03 BILATERAL CARPAL TUNNEL SYNDROME: Primary | ICD-10-CM

## 2019-06-19 NOTE — PROGRESS NOTES
[address] [facility]  OFFICE PROCEDURE PROGRESS NOTE        Chart reviewed for the following:   Anila Lovett MD, have reviewed the History, Physical and updated the Allergic reactions for Hammad Mallory performed immediately prior to start of procedure:   Anila Lovett MD, have performed the following reviews on Yuri Chan prior to the start of the procedure:            * Patient was identified by name and date of birth   * Agreement on procedure being performed was verified  * Risks and Benefits explained to the patient  * Procedure site verified and marked as necessary  * Patient was positioned for comfort  * Consent was signed and verified     Time: 1:19 PM    Date of procedure: 6/19/2019    Procedure performed by:  Julia Allison MD    Provider assisted by: Joseph Sierra MA    Patient assisted by: self    How tolerated by patient: tolerated the procedure well with no complications    Post Procedural Pain Scale: 0 - No Hurt    Comments: none      Patient: Carmen Gann     ID: [id number] Physician: Rayo Simons. Radha Barragan MD   Gender: Male Ref Phys: Rayo Simons. Radha Barragan MD   Handedness: Joseph Sierra     Study Date: June 19, 2019         Patient History: Numbness and tingling in both of his hands.     Nerve Conduction Studies  Anti Sensory Summary Table     Stim Site NR Peak (ms) Norm Peak (ms) O-P Amp (µV) Norm O-P Amp Dist (cm) Calin (m/s)   Left Median 2nd Digit Anti Sensory (2nd Digit)   Wrist    3.8 <3.5 8.9 >20 13.0 43.3   Site 2    3.9  8.6      Right Median 2nd Digit Anti Sensory (2nd Digit)   Wrist    5.3 <3.5 1.0 >20 13.0 28.3   Site 2    4.9  6.2      Left Sural Anti Sensory (Ankle)   Calf    3.3 <4.4 6.1 >6.0 14.0 53.8   Site 2    3.3  6.2      Site 3    3.3  5.1      Left Ulnar Anti Sensory (5th Digit )   Wrist    3.0 <3.1 9.3 >17.0 11.0 47.8   Site 2    3.1  34.9      Right Ulnar Anti Sensory (5th Digit )   Wrist    2.5 <3.1 12.3 >17.0 11.0 57.9   Site 2    2.5 10.6        Motor Summary Table     Stim Site NR Onset (ms) Norm Onset (ms) O-P Amp (mV) Norm O-P Amp Dist (cm) Calin (m/s) Norm Calin (m/s)   Left Median Motor (Abd Poll Brev)   Wrist    3.9 <4.4 10.9 >4.0 24.0 57.1 >49   Elbow    8.1  10.7       Right Median Motor (Abd Poll Brev)   Wrist    4.3 <4.4 6.2 >4.0 24.0 52.2 >49   Elbow    8.9  7.0       Left Peroneal Motor (EDB)   Ankle    3.9 <6.5 2.5 >2.0 37.0 44.0 >44   Fibula (Head)    12.3  2.1  10.0 50.0    Pop Fossa    14.3  2.0       Left Tibial Motor (Abd Ramirez Brev)   Ankle    4.1 <5.8 3.7 >4.0 46.0 39.3 >41   Knee    15.8  2.6       Left Ulnar Motor (Abd Dig Minimi )   Wrist    3.1 <3.3 6.4 >6.0 23.0 60.5 >49   B Elbow    6.9  5.2  14.0 53.8 >50   A Elbow    9.5  7.8       Right Ulnar Motor (Abd Dig Minimi )   Wrist    3.0 <3.3 7.1 >6.0 23.0 65.7 >49   B Elbow    6.5  7.8  13.0 50.0 >50   A Elbow    9.1  7.8           EMG     Side Muscle Nerve Root Ins Act Fibs Psw Fasc Amp Dur Poly Recrt Int Wilhelminia Page Comment   Left Deltoid Axillary C5-6 Nml Nml Nml None Nml Nml 0 Nml Nml    Left Biceps Musculocut C5-6 Nml Nml Nml None Nml Nml 0 Nml Nml    Left Triceps Radial C6-7-8 Nml Nml Nml None Nml Nml 0 Nml Nml    Left FlexCarRad Median C6-7 Nml Nml Nml None Nml Nml 0 Nml Nml    Left 1stDorInt Ulnar C8-T1 Nml Nml Nml None Nml Nml 0 Nml Nml    Left Abd Poll Brev Median C8-T1 Nml Nml Nml None Nml Nml 0 Nml Nml    Left Cervical Parasp Up Rami C1-3 Nml Nml Nml          Left Cervical Parasp Mid Rami C4-6 Nml Nml Nml          Left Cervical Parasp Low Rami C7-8 Nml Nml Nml            NCS/EMG FINDINGS:     Evaluation of the Left median motor, the Right median motor, the Left peroneal motor, the Left ulnar motor, the Right ulnar motor, the Left sural sensory, the Left ulnar sensory, and the Right ulnar sensory nerves were unremarkable.  The Left tibial motor nerve showed reduced amplitude (3.7 mV) and decreased conduction velocity (Knee-Ankle, 39.3 m/s).    The Left Median 2nd Digit sensory and the Right Median 2nd Digit sensory nerves showed prolonged distal peak latency (L3.8, R5.3 ms), reduced amplitude (L8.9, R1.0 µV), and decreased conduction velocity (Wrist-2nd Digit, L43.3, R28.3 m/s). INTERPRETATION: Abnormal nerve conduction and EMG study showing signs of bilateral prolongation of the distal latency in the median nerves, worse on the right and on the left, consistent with bilateral carpal tunnel syndrome. ___________________________  Damon Levine MD          Waveforms:

## 2019-06-19 NOTE — LETTER
6/19/19 Patient: Irvin Castañeda YOB: 1960 Date of Visit: 6/19/2019 Tr Dale MD 
[facility] [phone] VIA In Basket Dear Tr Dale MD, Thank you for referring Mr. Claudell Register to [facility] for evaluation. My notes for this consultation are attached. If you have questions, please do not hesitate to call me. I look forward to following your patient along with you.  
 
 
Sincerely, 
 
Shantal Prince MD

## 2019-06-24 DIAGNOSIS — G62.9 NEUROPATHY: ICD-10-CM

## 2019-06-24 NOTE — TELEPHONE ENCOUNTER
Patient request refill on Gabapentin -last scripted 4/29/2019.       Last appointment:  4/29/2019  Next appointment:  8/6/2019

## 2019-07-05 ENCOUNTER — OFFICE VISIT (OUTPATIENT)
Dept: NEUROLOGY | Age: 59
End: 2019-07-05

## 2019-07-05 VITALS
HEART RATE: 82 BPM | OXYGEN SATURATION: 98 % | BODY MASS INDEX: 30.49 KG/M2 | RESPIRATION RATE: 22 BRPM | TEMPERATURE: 98.2 F | HEIGHT: 71 IN | SYSTOLIC BLOOD PRESSURE: 116 MMHG | DIASTOLIC BLOOD PRESSURE: 70 MMHG | WEIGHT: 217.8 LBS

## 2019-07-05 DIAGNOSIS — G56.03 BILATERAL CARPAL TUNNEL SYNDROME: Primary | ICD-10-CM

## 2019-07-05 DIAGNOSIS — E11.40 TYPE 2 DIABETES MELLITUS WITH DIABETIC NEUROPATHY, WITHOUT LONG-TERM CURRENT USE OF INSULIN (HCC): ICD-10-CM

## 2019-07-05 DIAGNOSIS — F10.29 ALCOHOL DEPENDENCE WITH UNSPECIFIED ALCOHOL-INDUCED DISORDER (HCC): ICD-10-CM

## 2019-07-05 NOTE — PROGRESS NOTES
Marcia Matias is a 63 y.o. male in today for follow-up on EMG results. Learning assessment previously completed 2/14/2018; primary language is [place]. 1. Have you been to the ER, urgent care clinic since your last visit? Hospitalized since your last visit? No    2. Have you seen or consulted any other health care providers outside of the [address] [place] [address] since your last visit? Include any pap smears or colon screening.  No

## 2019-07-05 NOTE — PROGRESS NOTES
Re:  Steffany Franco,Follow up visit     7/5/2019 9:31 AM    SSN: [id number]    Subjective:   Joni Rogers returns for follow up. There's been no change in his symptoms, continues to get numbness of the hands continues to bother him, especially with sleep. No other changes. Medications:    Current Outpatient Medications   Medication Sig Dispense Refill    gabapentin (NEURONTIN) 400 mg capsule Take 2 Caps by mouth three (3) times daily. 60 Cap 2    QUEtiapine SR (SEROQUEL XR) 300 mg sr tablet Take 2 Tabs by mouth nightly. 180 Tab 0    lisinopril-hydroCHLOROthiazide (PRINZIDE, ZESTORETIC) 20-12.5 mg per tablet take 2 tablets by mouth once daily 180 Tab 1    glimepiride (AMARYL) 2 mg tablet take 1 tablet by mouth every morning 90 Tab 1    metFORMIN (GLUCOPHAGE) 1,000 mg tablet TAKE 1 TABLET BY MOUTH TWICE A DAY WITH MEALS 180 Tab 1    ferrous sulfate (IRON) 325 mg (65 mg iron) EC tablet take 1 tablet once daily 90 Tab 1    dilTIAZem CD (CARDIZEM CD) 120 mg ER capsule Take 1 Cap by mouth daily. 90 Cap 3    magnesium oxide (MAG-OX) 400 mg tablet Take 1 Tab by mouth daily. 90 Tab 2    aspirin delayed-release 81 mg tablet Take  by mouth daily.  IBUPROFEN PO Take  by mouth.       glucose blood VI test strips (ASCENSIA AUTODISC VI, ONE TOUCH ULTRA TEST VI) strip Use as directed to test blood sugar twice a day 100 Strip 6       Vital signs:    Visit Vitals  /70 (BP 1 Location: Left arm, BP Patient Position: Sitting)   Pulse 82   Temp 98.2 °F (36.8 °C) (Oral)   Resp 22   Ht 5' 11\" (1.803 m)   Wt 98.8 kg (217 lb 12.8 oz)   SpO2 98%   BMI 30.38 kg/m²       Review of Systems:   As above otherwise 11 point review of systems negative including;   Constitutional no fever or chills  Skin denies rash or itching  HEENT  Denies tinnitus, hearing lose  Eyes denies diplopia vision lose  Respiratory denies sortness of breath  Cardiovascular denies chest pain, dyspnea on exertion  Gastrointestinal denies nausea, vomiting, diarrhea, constipation  Genitourinary denies incontinence  Musculoskeletal denies joint pain or swelling  Endocrine denies weight change  Hematology denies easy bruising or bleeding   Neurological as above in HPI      Patient Active Problem List   Diagnosis Code    Anxiety and depression F41.9, F32.9    Alcohol abuse F10.10    H/O lumbosacral spine surgery Z98.890    Chronic low back pain with sciatica M54.40, G89.29    H/O coronary angioplasty Z98.61    Depression F32.9    Suicidal thoughts R45.851    Pancytopenia ([address] M14.763    Essential hypertension with goal blood pressure less than 130/80 I10    Type 2 diabetes mellitus without complication, without long-term current use of insulin ([facility]) E11.9    CAD (coronary artery disease) I25.10    Tachycardia R00.0    Atrial fibrillation ([facility]) I48.91    Mild concentric left ventricular hypertrophy (LVH) A93.0    Diastolic dysfunction U30.19    Tricuspid regurgitation I07.1    Mitral regurgitation I34.0    Alcohol dependence ([facility]) F10.20    Family history of premature CAD Z82.49    Type 2 diabetes mellitus with diabetic neuropathy ([facility]) E11.40         Objective: The patient is awake, alert, and oriented x 4. Fund of knowledge is adequate. Speech is fluent and memory is intact. Cranial Nerves: II - Visual fields are full to confrontation. III, IV, VI - Extraocular movements are intact. There is no nystagmus. V - Facial sensation is intact to pinprick. VII - Face is symmetrical.  VIII - Hearing is present. IX, X, XII - Palate is symmetrical.   XI - Shoulder shrugging and head turning intact  Motor: The patient moves all four limbs fairly well and symmetrically. Tone is normal. Reflexes are 2+ and symmetrical. Plantars are down going. Gait is normal.        Patient: Darius Greer     ID: [id number] Physician: Martina Johnson MD   Gender: Male Ref Phys: Martina Johnson MD   Handedness: [facility] Alayna Harris Date: June 19, 2019         Patient History: Numbness and tingling in both of his hands.     Nerve Conduction Studies  Anti Sensory Summary Table     Stim Site NR Peak (ms) Norm Peak (ms) O-P Amp (µV) Norm O-P Amp Dist (cm) Calin (m/s)   Left Median 2nd Digit Anti Sensory (2nd Digit)   Wrist    3.8 <3.5 8.9 >20 13.0 43.3   Site 2    3.9  8.6      Right Median 2nd Digit Anti Sensory (2nd Digit)   Wrist    5.3 <3.5 1.0 >20 13.0 28.3   Site 2    4.9  6.2      Left Sural Anti Sensory (Ankle)   Calf    3.3 <4.4 6.1 >6.0 14.0 53.8   Site 2    3.3  6.2      Site 3    3.3  5.1      Left Ulnar Anti Sensory (5th Digit )   Wrist    3.0 <3.1 9.3 >17.0 11.0 47.8   Site 2    3.1  34.9      Right Ulnar Anti Sensory (5th Digit )   Wrist    2.5 <3.1 12.3 >17.0 11.0 57.9   Site 2    2.5  10.6        Motor Summary Table     Stim Site NR Onset (ms) Norm Onset (ms) O-P Amp (mV) Norm O-P Amp Dist (cm) Calin (m/s) Norm Calin (m/s)   Left Median Motor (Abd Poll Brev)   Wrist    3.9 <4.4 10.9 >4.0 24.0 57.1 >49   Elbow    8.1  10.7       Right Median Motor (Abd Poll Brev)   Wrist    4.3 <4.4 6.2 >4.0 24.0 52.2 >49   Elbow    8.9  7.0       Left Peroneal Motor (EDB)   Ankle    3.9 <6.5 2.5 >2.0 37.0 44.0 >44   Fibula (Head)    12.3  2.1  10.0 50.0    Pop Fossa    14.3  2.0       Left Tibial Motor (Abd Ramirez Brev)   Ankle    4.1 <5.8 3.7 >4.0 46.0 39.3 >41   Knee    15.8  2.6       Left Ulnar Motor (Abd Dig Minimi )   Wrist    3.1 <3.3 6.4 >6.0 23.0 60.5 >49   B Elbow    6.9  5.2  14.0 53.8 >50   A Elbow    9.5  7.8       Right Ulnar Motor (Abd Dig Minimi )   Wrist    3.0 <3.3 7.1 >6.0 23.0 65.7 >49   B Elbow    6.5  7.8  13.0 50.0 >50   A Elbow    9.1  7.8           EMG     Side Muscle Nerve Root Ins Act Fibs Psw Fasc Amp Dur Poly Recrt Int Rivera Araya Comment   Left Deltoid Axillary C5-6 Nml Nml Nml None Nml Nml 0 Nml Nml    Left Biceps Musculocut C5-6 Nml Nml Nml None Nml Nml 0 Nml Nml    Left Triceps Radial C6-7-8 Nml Nml Nml None Nml Nml 0 Nml Nml    Left FlexCarRad Median C6-7 Nml Nml Nml None Nml Nml 0 Nml Nml    Left 1stDorInt Ulnar C8-T1 Nml Nml Nml None Nml Nml 0 Nml Nml    Left Abd Poll Brev Median C8-T1 Nml Nml Nml None Nml Nml 0 Nml Nml    Left Cervical Parasp Up Rami C1-3 Nml Nml Nml          Left Cervical Parasp Mid Rami C4-6 Nml Nml Nml          Left Cervical Parasp Low Rami C7-8 Nml Nml Nml            NCS/EMG FINDINGS:     Evaluation of the Left median motor, the Right median motor, the Left peroneal motor, the Left ulnar motor, the Right ulnar motor, the Left sural sensory, the Left ulnar sensory, and the Right ulnar sensory nerves were unremarkable.  The Left tibial motor nerve showed reduced amplitude (3.7 mV) and decreased conduction velocity (Knee-Ankle, 39.3 m/s).  The Left Median 2nd Digit sensory and the Right Median 2nd Digit sensory nerves showed prolonged distal peak latency (L3.8, R5.3 ms), reduced amplitude (L8.9, R1.0 µV), and decreased conduction velocity (Wrist-2nd Digit, L43.3, R28.3 m/s). INTERPRETATION: Abnormal nerve conduction and EMG study showing signs of bilateral prolongation of the distal latency in the median nerves, worse on the right and on the left, consistent with bilateral carpal tunnel syndrome. ___________________________  Lupis Jaramillo MD          Waveforms:                               I have reviewed the above imagines myself.     CBC:   Lab Results   Component Value Date/Time    WBC 2.1 (L) 07/05/2018 04:40 AM    RBC 4.07 07/05/2018 04:40 AM    HGB 12.9 07/05/2018 04:40 AM    HCT 37.2 07/05/2018 04:40 AM    PLATELET 46 (LL) 44/07/7697 04:40 AM     BMP:   Lab Results   Component Value Date/Time    Glucose 315 (H) 10/05/2018 08:28 AM    Sodium 136 10/05/2018 08:28 AM    Potassium 4.0 10/05/2018 08:28 AM    Chloride 102 10/05/2018 08:28 AM    CO2 16 (L) 10/05/2018 08:28 AM    BUN 13 10/05/2018 08:28 AM    Creatinine 1.00 10/05/2018 08:28 AM    Calcium 8.5 10/05/2018 08:28 AM CMP:   Lab Results   Component Value Date/Time    Glucose 315 (H) 10/05/2018 08:28 AM    Sodium 136 10/05/2018 08:28 AM    Potassium 4.0 10/05/2018 08:28 AM    Chloride 102 10/05/2018 08:28 AM    CO2 16 (L) 10/05/2018 08:28 AM    BUN 13 10/05/2018 08:28 AM    Creatinine 1.00 10/05/2018 08:28 AM    Calcium 8.5 10/05/2018 08:28 AM    Anion gap 18 10/05/2018 08:28 AM    BUN/Creatinine ratio 13 10/05/2018 08:28 AM    Alk. phosphatase 118 (H) 10/05/2018 08:28 AM    Protein, total 7.6 10/05/2018 08:28 AM    Albumin 3.6 10/05/2018 08:28 AM    Globulin 4.0 10/05/2018 08:28 AM    A-G Ratio 0.9 10/05/2018 08:28 AM     Coagulation:   Lab Results   Component Value Date/Time    Prothrombin time 13.9 (H) 07/03/2018 09:22 PM    INR 1.2 (H) 07/03/2018 09:22 PM    aPTT 26 03/14/2018 09:53 AM     Cardiac markers: No results found for: CPK, CKND1, KIERRA    Assessment:  Definitive carpal tunnel syndrome. Plan:  Refer for hand surgery. Will see in 6 months if symptoms persist.      Sincerely,        Joy Sidhu M.D.

## 2019-07-08 RX ORDER — GABAPENTIN 400 MG/1
800 CAPSULE ORAL 3 TIMES DAILY
Qty: 60 CAP | Refills: 2 | Status: SHIPPED | OUTPATIENT
Start: 2019-07-08 | End: 2019-08-30 | Stop reason: SDUPTHER

## 2019-07-10 ENCOUNTER — OFFICE VISIT (OUTPATIENT)
Dept: FAMILY MEDICINE CLINIC | Age: 59
End: 2019-07-10

## 2019-07-10 ENCOUNTER — HOSPITAL ENCOUNTER (OUTPATIENT)
Dept: LAB | Age: 59
Discharge: HOME | End: 2019-07-10
Payer: MEDICAID

## 2019-07-10 VITALS
WEIGHT: 213 LBS | DIASTOLIC BLOOD PRESSURE: 84 MMHG | SYSTOLIC BLOOD PRESSURE: 128 MMHG | RESPIRATION RATE: 12 BRPM | HEART RATE: 99 BPM | OXYGEN SATURATION: 97 % | TEMPERATURE: 98.6 F | BODY MASS INDEX: 29.82 KG/M2 | HEIGHT: 71 IN

## 2019-07-10 DIAGNOSIS — G56.03 BILATERAL CARPAL TUNNEL SYNDROME: Primary | ICD-10-CM
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
## 2019-07-10 DIAGNOSIS — G62.9 NEUROPATHY: ICD-10-CM

## 2019-07-10 DIAGNOSIS — I10 ESSENTIAL HYPERTENSION WITH GOAL BLOOD PRESSURE LESS THAN 130/80: ICD-10-CM

## 2019-07-10 DIAGNOSIS — K12.0 CANKER SORES ORAL: ICD-10-CM

## 2019-07-10 DIAGNOSIS — E11.9 TYPE 2 DIABETES MELLITUS WITHOUT COMPLICATION, WITHOUT LONG-TERM CURRENT USE OF INSULIN (HCC): ICD-10-CM

## 2019-07-10 PROCEDURE — 80061 LIPID PANEL: CPT

## 2019-07-10 PROCEDURE — 36415 COLL VENOUS BLD VENIPUNCTURE: CPT

## 2019-07-10 RX ORDER — CAPSAICIN 0.03 G/100G
CREAM TOPICAL 3 TIMES DAILY
Qty: 60 G | Refills: 0 | Status: SHIPPED | OUTPATIENT
Start: 2019-07-10 | End: 2020-01-23

## 2019-07-10 NOTE — PROGRESS NOTES
1. Have you been to the ER, urgent care clinic since your last visit? Hospitalized since your last visit? No    2. Have you seen or consulted any other health care providers outside of the [address] [place] [address] since your last visit? Include any pap smears or colon screening.  No

## 2019-07-10 NOTE — PATIENT INSTRUCTIONS
Canker Sore: Care Instructions  Your Care Instructions  Canker sores are painful white sores in the mouth. They usually begin with a tingling feeling, followed by a red spot or bump that turns white. Canker sores appear most often on the tongue, inside the cheeks, and inside the lips. They can be very painful and can make talking, eating, and drinking difficult. A canker sore may form after an injury or stretching of tissues in the mouth, which can happen, for example, during a dental procedure or teeth cleaning. If you accidentally bite your tongue or the inside of your cheek, you may end up with a canker sore. Other possible causes are infection, certain foods, and stress. Canker sores are not contagious. The pain from your canker sore should decrease in 7 to 10 days, and it should heal completely in 1 to 3 weeks. In most cases, a canker sore will go away by itself. Home treatment can ease pain and discomfort. If you have a large or deep canker sore that does not seem to be getting better after 2 weeks, your doctor may prescribe medicine. Canker sores often come back again. Follow-up care is a key part of your treatment and safety. Be sure to make and go to all appointments, and call your doctor if you are having problems. It's also a good idea to know your test results and keep a list of the medicines you take. How can you care for yourself at home? · Drink cold liquids, such as water or iced tea, or eat flavored ice pops or frozen juices. Use a straw to keep the liquid from coming in contact with your canker sore. · Eat soft, bland foods that are easy to chew and swallow, such as ice cream, custard, applesauce, cottage cheese, macaroni and cheese, soft-cooked eggs, yogurt, or cream soups. · Cut foods into small pieces, or grind, mash, blend, or puree foods to make them easier to chew and swallow.   · While your canker sore heals, avoid coffee, chocolate, spicy and salty foods, citrus fruits, nuts, seeds, and tomatoes. · To soothe your canker sore and help it heal:  ? Use an over-the-counter numbing medicine, such as Orabase or Anbesol. ? Dab a bit of Milk of Magnesia on the canker sore 3 or 4 times a day. · Put ice on your sore to reduce the pain. · Take anti-inflammatory medicines to reduce pain, as needed. These include ibuprofen (Advil, Motrin) and naproxen (Aleve). Read and follow all instructions on the label. · Use a soft-bristle toothbrush, and brush your teeth well but carefully. · Do not smoke or use spit tobacco. Tobacco can cause mouth problems and slow healing. If you need help quitting, talk to your doctor about stop-smoking programs and medicines. These can increase your chances of quitting for good. When should you call for help? Call your doctor now or seek immediate medical care if:    · You have signs of infection, such as:  ? Increased pain, swelling, warmth, or redness. ? Red streaks leading from the area. ? Pus draining from the area. ? A fever.    Watch closely for changes in your health, and be sure to contact your doctor if:    · You do not get better as expected. Where can you learn more? Go to http://jace-katherine.info/. Enter O022 in the search box to learn more about \"Canker Sore: Care Instructions. \"  Current as of: March 27, 2018  Content Version: 11.9  © 4758-1407 [employer]. Care instructions adapted under license by [employer] (which disclaims liability or warranty for this information). If you have questions about a medical condition or this instruction, always ask your healthcare professional. [place] [address] any warranty or liability for your use of this information.

## 2019-07-10 NOTE — PROGRESS NOTES
[facility]    CC: F/U of neuropathy    HPI:     Neuropathy:  -Diagnosed by Dr. Marissa Cheung on 6/7/2019 as bilateral carpal tunnel  -Diagnosis confirmed by EMG on 6/19/2019  -Was referred to orthopedic surgeon and sports medicine by Dr. Marissa Cheung  -Has appointment with orthopedic surgeon on 8/7/2019      Swelling:  -Right lower leg  -Occurred on Friday  -Has bit by a horse fly on right lower leg before it occured  -Was associated with area turn blood red in color  -Has mostly resolved       Bumps:  -Started 4-5 months ago  -On tongue  -Are painful  -Pops them  -Has tried peroxide  -Has been worsening      ROS: Positive items marked in RED  CON: fever, chills  Cardiovascular: palpitations, CP  Resp: SOB, cough  GI: nausea, vomiting, diarrhea  : dysuria, hematuria      Past Medical History:   Diagnosis Date    Alcohol abuse 8/22/2013    CAD (coronary artery disease)     \"cardiac stent placed 1 year ago\"    Depression     Diastolic dysfunction 2/2/2886    DMII (diabetes mellitus, type 2) [facility] [address]     Hypertension     Mild concentric left ventricular hypertrophy (LVH) 7/9/2018    Mitral regurgitation 7/9/2018    Substance abuse ([facility]     Suicidal thoughts     Tricuspid regurgitation 0/3/5071    Umbilical hernia        Past Surgical History:   Procedure Laterality Date    HX BACK SURGERY  2014    HX CORONARY STENT PLACEMENT  2012    No stent placed       Family History   Problem Relation Age of Onset    Suicide Father     Diabetes Brother     Psychiatric Disorder Other        Social History     Socioeconomic History    Marital status: LEGALLY      Spouse name: Not on file    Number of children: Not on file    Years of education: Not on file    Highest education level: Not on file   Tobacco Use    Smoking status: Never Smoker    Smokeless tobacco: Never Used   Substance and Sexual Activity    Alcohol use:  Yes     Alcohol/week: 37.8 oz     Types: 63 Cans of beer per week    Drug use: No     Types: Benzodiazepines    Sexual activity: Not Currently   Social History Narrative     X 2. Lives with wife and 21year old son. 6year old son was taken away by . Unemployed        Allergies   Allergen Reactions    Aleve [Naproxen Sodium] Itching and Hives    Naproxen Hives         Current Outpatient Medications:     gabapentin (NEURONTIN) 400 mg capsule, Take 2 Caps by mouth three (3) times daily. , Disp: 60 Cap, Rfl: 2    QUEtiapine SR (SEROQUEL XR) 300 mg sr tablet, Take 2 Tabs by mouth nightly. (Patient taking differently: Take 450 mg by mouth nightly.), Disp: 180 Tab, Rfl: 0    lisinopril-hydroCHLOROthiazide (PRINZIDE, ZESTORETIC) 20-12.5 mg per tablet, take 2 tablets by mouth once daily, Disp: 180 Tab, Rfl: 1    glimepiride (AMARYL) 2 mg tablet, take 1 tablet by mouth every morning, Disp: 90 Tab, Rfl: 1    metFORMIN (GLUCOPHAGE) 1,000 mg tablet, TAKE 1 TABLET BY MOUTH TWICE A DAY WITH MEALS, Disp: 180 Tab, Rfl: 1    ferrous sulfate (IRON) 325 mg (65 mg iron) EC tablet, take 1 tablet once daily, Disp: 90 Tab, Rfl: 1    dilTIAZem CD (CARDIZEM CD) 120 mg ER capsule, Take 1 Cap by mouth daily. , Disp: 90 Cap, Rfl: 3    magnesium oxide (MAG-OX) 400 mg tablet, Take 1 Tab by mouth daily. , Disp: 90 Tab, Rfl: 2    aspirin delayed-release 81 mg tablet, Take  by mouth daily. , Disp: , Rfl:     IBUPROFEN PO, Take  by mouth., Disp: , Rfl:     glucose blood VI test strips (ASCENSIA AUTODISC VI, ONE TOUCH ULTRA TEST VI) strip, Use as directed to test blood sugar twice a day, Disp: 100 Strip, Rfl: 6    Physical Exam:      /84   Pulse 99   Temp 98.6 °F (37 °C) (Oral)   Resp 12   Ht 5' 11\" (1.803 m)   Wt 213 lb (96.6 kg)   SpO2 97%   BMI 29.71 kg/m²     General:  WD, WN, NAD, conversant  Eyes: sclera clear bilaterally, no discharge noted, eyelids normal in appearance  HENT: NCAT, shallow ulcers with white floor noted on sides of tongue, oropharynx clear, MMM  Lungs: CTAB, normal respiratory effort and rate  CV: RRR, no MRGs  ABD: soft, non-tender, non-distended, normal bowel sounds  Ext: no peripheral edema or digital cyanosis noted  Skin: normal temperature, turgor, color, and texture  Psych: alert and oriented to person, place and situation, normal affect  Neuro: speech normal, moving all extremities, gait normal      Assessment/Plan     Bilateral Carpal Tunnel Syndrome:  -Will start on trial of capsaicin for neuropathic pain  -Will defer further management to specialists  -F/U at already scheduled appointment      Canker Sores:  -Counseled on diagnosis and care recommendations  -Handout given on canker sore care  -F/U at already scheduled appointment      Reaction to Insect Bite, Resolved:  -Advised on avoidance of horseflies  -No indication for further intervention  -F/U at already scheduled appointment        Marylene Bellow, MD  7/10/2019, 1:22 PM

## 2019-07-11 LAB
CHOLEST SERPL-MCNC: 205 MG/DL
HDLC SERPL-MCNC: 55 MG/DL (ref 40–60)
HDLC SERPL: 3.7 {RATIO} (ref 0–5)
LDLC SERPL CALC-MCNC: 119.8 MG/DL (ref 0–100)
LIPID PROFILE,FLP: ABNORMAL
TRIGL SERPL-MCNC: 151 MG/DL (ref ?–150)
VLDLC SERPL CALC-MCNC: 30.2 MG/DL

## 2019-07-16 ENCOUNTER — OFFICE VISIT (OUTPATIENT)
Dept: ORTHOPEDIC SURGERY | Age: 59
End: 2019-07-16

## 2019-07-16 VITALS
BODY MASS INDEX: 30.24 KG/M2 | HEIGHT: 71 IN | DIASTOLIC BLOOD PRESSURE: 73 MMHG | HEART RATE: 85 BPM | RESPIRATION RATE: 15 BRPM | WEIGHT: 216 LBS | TEMPERATURE: 98.6 F | SYSTOLIC BLOOD PRESSURE: 114 MMHG

## 2019-07-16 DIAGNOSIS — M50.30 DEGENERATIVE, INTERVERTEBRAL DISC, CERVICAL: ICD-10-CM

## 2019-07-16 DIAGNOSIS — G56.01 CARPAL TUNNEL SYNDROME ON RIGHT: ICD-10-CM

## 2019-07-16 DIAGNOSIS — G56.02 CARPAL TUNNEL SYNDROME ON LEFT: Primary | ICD-10-CM

## 2019-07-16 RX ORDER — PREDNISONE 20 MG/1
TABLET ORAL
Qty: 28 TAB | Refills: 0 | Status: SHIPPED | OUTPATIENT
Start: 2019-07-16 | End: 2019-08-06 | Stop reason: ALTCHOICE

## 2019-07-16 NOTE — PROGRESS NOTES
AVS reviewed: YES  HEP: AT demonstrated & Pt performed  Resources Provided: YES Both Videos & Photos. YouTube videos & printed photos of wrist braces. Patient questions/concerns answered: YES. Explained Prednisone Rx.   Patient verbalized understanding of treatment plan: YES

## 2019-07-16 NOTE — PATIENT INSTRUCTIONS
Search YouTube for my channel:    Dr. Murali Ruiz      Can purchase a cock up wrist brace over the counter at a local drug store like [employer] or you can take order for cock up wrist brace to medical supply store to be charged through insurance.

## 2019-07-16 NOTE — PROGRESS NOTES
HISTORY OF PRESENT ILLNESS    Morena Espinal is a 63y.o. year old male comes in today as new patient for: carpal tunnel right>left    Patients symptoms have been present for 2 years. Pain level 8/10 right neck to hand. It has worsened with sleeping. Patient has tried:  neurontin and motrin w/ some benefit. It is described as pain and numbness into right hand w/o known injury. EMG Dr. Mateo Mckeon 6/19/19  INTERPRETATION: Abnormal nerve conduction and EMG study showing signs of bilateral prolongation of the distal latency in the median nerves, worse on the right and on the left, consistent with bilateral carpal tunnel syndrome.     Past Surgical History:   Procedure Laterality Date    HX BACK SURGERY  2014    HX CORONARY STENT PLACEMENT  2012    No stent placed     Social History     Socioeconomic History    Marital status: LEGALLY      Spouse name: Not on file    Number of children: Not on file    Years of education: Not on file    Highest education level: Not on file   Tobacco Use    Smoking status: Never Smoker    Smokeless tobacco: Never Used   Substance and Sexual Activity    Alcohol use: Yes     Alcohol/week: 37.8 oz     Types: 63 Cans of beer per week    Drug use: No     Types: Benzodiazepines    Sexual activity: Not Currently   Social History Narrative     X 2. Lives with wife and 21year old son. 6year old son was taken away by . Unemployed       Current Outpatient Medications   Medication Sig Dispense Refill    glucose blood VI test strips (ASCENSIA AUTODISC VI, ONE TOUCH ULTRA TEST VI) strip Use as directed to test blood sugar twice a day 100 Strip 6    capsicum oleoresin 0.025 % topical cream Apply  to affected area three (3) times daily. 60 g 0    gabapentin (NEURONTIN) 400 mg capsule Take 2 Caps by mouth three (3) times daily. 60 Cap 2    QUEtiapine SR (SEROQUEL XR) 300 mg sr tablet Take 2 Tabs by mouth nightly.  (Patient taking differently: Take 450 mg by mouth nightly.) 180 Tab 0    lisinopril-hydroCHLOROthiazide (PRINZIDE, ZESTORETIC) 20-12.5 mg per tablet take 2 tablets by mouth once daily 180 Tab 1    glimepiride (AMARYL) 2 mg tablet take 1 tablet by mouth every morning 90 Tab 1    metFORMIN (GLUCOPHAGE) 1,000 mg tablet TAKE 1 TABLET BY MOUTH TWICE A DAY WITH MEALS 180 Tab 1    ferrous sulfate (IRON) 325 mg (65 mg iron) EC tablet take 1 tablet once daily 90 Tab 1    dilTIAZem CD (CARDIZEM CD) 120 mg ER capsule Take 1 Cap by mouth daily. 90 Cap 3    magnesium oxide (MAG-OX) 400 mg tablet Take 1 Tab by mouth daily. 90 Tab 2    aspirin delayed-release 81 mg tablet Take  by mouth daily.  IBUPROFEN PO Take  by mouth. Past Medical History:   Diagnosis Date    Alcohol abuse 8/22/2013    CAD (coronary artery disease)     \"cardiac stent placed 1 year ago\"    Depression     Diastolic dysfunction 8/4/3978    DMII [facility]di[facility] mellitus, type 2) ([facility]     Hypertension     Mild concentric left ventricular hypertrophy (LVH) 7/9/2018    Mitral regurgitation 7/9/2018    Substance abuse [facility] [address]     Suicidal thoughts     Tricuspid regurgitation 9/9/1254    Umbilical hernia      Family History   Problem Relation Age of Onset    Suicide Father     Diabetes Brother     Psychiatric Disorder Other          ROS:  See HPI. Some hand locking. All other systems reviewed and negative aside from that written in the HPI. Objective:  /73   Pulse 85   Temp 98.6 °F (37 °C)   Resp 15   Ht 5' 11\" (1.803 m)   Wt 216 lb (98 kg)   BMI 30.13 kg/m²   GEN:  Appears stated age in NAD. HEAD:  Normocephalic, Atraumatic. NEURO:  Sensation intact light touch upper and lower extremities. Biceps & Triceps reflexes +2/4 bilaterally. right hand dominant. M/S:  right elbow/wrist: Negative luba tenderness. Phalen's positive right>left. Tinel's positive.   Strength +5/5 bilateral .  Piano key sign Negative bilateral .  Carpal bone motion normal.  Finklestein's negative TFCC Load Test negative. BILATERAL neither epicondyle(s) without TTP not changed with wrist extension. negative muscular atrophy. EXT:  no clubbing/cyanosis. no edema. HEENT: Conjunctiva/lids WNL. External canals/nares WNL. Tongue midline. PERRL, EOMI. Hearing intact. NECK: Trachea midline. Supple, Full ROM. No thyromegaly. CARDIAC: No edema. LUNGS: Normal effort. ABD: Soft, no masses. No HSM. PSYCH: A+O x3. Appropriate judgment and insight. Assessment/Plan:     ICD-10-CM ICD-9-CM    1. Carpal tunnel syndrome on left G56.02 354.0 AMB SUPPLY ORDER      predniSONE (DELTASONE) 20 mg tablet   2. Carpal tunnel syndrome on right G56.01 354.0 AMB SUPPLY ORDER      predniSONE (DELTASONE) 20 mg tablet   3. Degenerative, intervertebral disc, cervical M50.30 722. 4 predniSONE (DELTASONE) 20 mg tablet       Patient (or guardian if minor) verbalizes understanding of evaluation and plan. Will start braces and HEP as demo w/ stretch and prednisone taper. Monitor neck as will likely improve.

## 2019-08-06 ENCOUNTER — OFFICE VISIT (OUTPATIENT)
Dept: FAMILY MEDICINE CLINIC | Age: 59
End: 2019-08-06

## 2019-08-06 VITALS
SYSTOLIC BLOOD PRESSURE: 122 MMHG | HEART RATE: 104 BPM | BODY MASS INDEX: 29.4 KG/M2 | WEIGHT: 210 LBS | HEIGHT: 71 IN | TEMPERATURE: 96.6 F | DIASTOLIC BLOOD PRESSURE: 70 MMHG | RESPIRATION RATE: 12 BRPM | OXYGEN SATURATION: 98 %

## 2019-08-06 DIAGNOSIS — E11.42 TYPE 2 DIABETES MELLITUS WITH DIABETIC POLYNEUROPATHY, WITHOUT LONG-TERM CURRENT USE OF INSULIN (HCC): Primary | ICD-10-CM

## 2019-08-06 DIAGNOSIS — I10 ESSENTIAL HYPERTENSION WITH GOAL BLOOD PRESSURE LESS THAN 130/80: ICD-10-CM

## 2019-08-06 DIAGNOSIS — G47.00 INSOMNIA, UNSPECIFIED TYPE: ICD-10-CM

## 2019-08-06 DIAGNOSIS — F32.A DEPRESSION, UNSPECIFIED DEPRESSION TYPE: ICD-10-CM

## 2019-08-06 DIAGNOSIS — E78.5 HYPERLIPIDEMIA, UNSPECIFIED HYPERLIPIDEMIA TYPE: ICD-10-CM

## 2019-08-06 NOTE — PROGRESS NOTES
[facility]    CC: Follow-up for chronic disease management    HPI:     DMII:  -Got requested fasting lipid panel  -Taking diabetic medication as prescribed. -Denies any side effects or issues with diabetic medication  -Checks blood sugar at home.   No log brought in for review  -Reports non fasting blood sugar has been in 120's-160's  -Does not follow any regular exercise regimen  -Diet is unchanged since last visit      HTN:  -Got requested fasting lipid panel  -Taking BP medication as prescribed  -Denies any side effects from the medication  -Does not check BP at home  -Does not follow a regular exercise regimen  -Diet is unchanged since last visit       Depression/Insomnia:  -Taking 400 - 600 mg of Seroquel nightly  -Denies any depression  -Reports medication work well to help him sleep  -States that the only side effect he has to the medication is sleepiness        ROS: Positive items marked in RED  CON: fever, chills  Cardiovascular: palpitations, CP  Resp: SOB, cough  GI: nausea, vomiting, diarrhea  : dysuria, hematuria      Past Medical History:   Diagnosis Date    Alcohol abuse 8/22/2013    CAD (coronary artery disease)     \"cardiac stent placed 1 year ago\"    Depression     Diastolic dysfunction 3/4/4807    [facility]II [facility], [facility] 2) [facility] [address]     Hypertension     Mild concentric left ventricular hypertrophy (LVH) 7/9/2018    Mitral regurgitation 7/9/2018    Substance abuse ([facility] [address]     Suicidal thoughts     Tricuspid regurgitation 4/9/9848    Umbilical hernia        Past Surgical History:   Procedure Laterality Date    HX BACK SURGERY  2014    HX CORONARY STENT PLACEMENT  2012    No stent placed       Family History   Problem Relation Age of Onset    Suicide Father     Diabetes Brother     Psychiatric Disorder Other        Social History     Socioeconomic History    Marital status: LEGALLY      Spouse name: Not on file    Number of children: Not on file    Years of education: Not on file    Highest education level: Not on file   Tobacco Use    Smoking status: Never Smoker    Smokeless tobacco: Never Used   Substance and Sexual Activity    Alcohol use: Yes     Alcohol/week: 63.0 standard drinks     Types: 63 Cans of beer per week    Drug use: No     Types: Benzodiazepines    Sexual activity: Not Currently   Social History Narrative     X 2. Lives with wife and 21year old son. 6year old son was taken away by . Unemployed        Allergies   Allergen Reactions    Aleve [Naproxen Sodium] Itching and Hives    Naproxen Hives         Current Outpatient Medications:     glucose blood VI test strips (ASCENSIA AUTODISC VI, ONE TOUCH ULTRA TEST VI) strip, Use as directed to test blood sugar twice a day, Disp: 100 Strip, Rfl: 6    capsicum oleoresin 0.025 % topical cream, Apply  to affected area three (3) times daily. , Disp: 60 g, Rfl: 0    gabapentin (NEURONTIN) 400 mg capsule, Take 2 Caps by mouth three (3) times daily. , Disp: 60 Cap, Rfl: 2    QUEtiapine SR (SEROQUEL XR) 300 mg sr tablet, Take 2 Tabs by mouth nightly. (Patient taking differently: Take 450 mg by mouth nightly.), Disp: 180 Tab, Rfl: 0    lisinopril-hydroCHLOROthiazide (PRINZIDE, ZESTORETIC) 20-12.5 mg per tablet, take 2 tablets by mouth once daily, Disp: 180 Tab, Rfl: 1    glimepiride (AMARYL) 2 mg tablet, take 1 tablet by mouth every morning, Disp: 90 Tab, Rfl: 1    metFORMIN (GLUCOPHAGE) 1,000 mg tablet, TAKE 1 TABLET BY MOUTH TWICE A DAY WITH MEALS, Disp: 180 Tab, Rfl: 1    ferrous sulfate (IRON) 325 mg (65 mg iron) EC tablet, take 1 tablet once daily, Disp: 90 Tab, Rfl: 1    dilTIAZem CD (CARDIZEM CD) 120 mg ER capsule, Take 1 Cap by mouth daily. , Disp: 90 Cap, Rfl: 3    magnesium oxide (MAG-OX) 400 mg tablet, Take 1 Tab by mouth daily. , Disp: 90 Tab, Rfl: 2    aspirin delayed-release 81 mg tablet, Take  by mouth daily. , Disp: , Rfl:     IBUPROFEN PO, Take 800 mg by mouth three (3) times daily as needed for Pain., Disp: , Rfl:     Physical Exam:      /70   Pulse (!) 104   Temp 96.6 °F (35.9 °C) (Oral)   Resp 12   Ht 5' 11\" (1.803 m)   Wt 210 lb (95.3 kg)   SpO2 98%   BMI 29.29 kg/m²     General:  WD, WN, NAD, conversant  Eyes: sclera clear bilaterally, no discharge noted, eyelids normal in appearance  HENT: NCAT  Lungs: CTAB, normal respiratory effort and rate  CV: RRR, no MRGs  ABD: soft, non-tender, non-distended, normal bowel sounds  Skin: normal temperature, turgor, color, and texture  Psych: alert and oriented to person, place and situation, normal affect  Neuro: speech normal, moving all extremities, gait normal    Diabetic foot exam:   Left Foot:   Visual Exam: normal    Pulse DP: 1+ (weak)   Filament test: normal sensation    Vibratory sensation: normal    Proprioception : normal      Right Foot:   Visual Exam: normal    Pulse DP: 2+ (normal)   Filament test: normal sensation    Vibratory sensation: normal   Proprioception : normal    Results for Foster Lee (MRN [id number]):   Ref.  Range 7/10/2019 13:48   Triglyceride Latest Ref Range: <150 MG/ (H)   Cholesterol, total Latest Ref Range: <200 MG/ (H)   HDL Cholesterol Latest Ref Range: 40 - 60 MG/DL 55   CHOL/HDL Ratio Latest Ref Range: 0 - 5.0   3.7   LDL, calculated Latest Ref Range: 0 - 100 MG/.8 (H)   VLDL, calculated Latest Units: MG/DL 30.2       Assessment/Plan     DMII, Well Controlled:  -Last HGBA1c at goal of <7%  -Will continue current diabetes medication regimen  -F/U in 6 months         HTN, Well Controlled:  -BP at goal of <130/80  -Will continue current BP medication regimen  -F/U in 6 months       Depression/Insomnia, Well Controlled:  -Will continue current Seroquel regimen  -F/U in 6 months      HLD, Inadequately Controlled:  -10 year ASCVD risk of 13.9%  -Unable to discuss statin therapy during visit due to lack of time  -Plan to discuss at next visit  -F/U in 6 months        Mariela Florentino MD  8/6/2019, 8:37 AM

## 2019-08-06 NOTE — PROGRESS NOTES
1. Have you been to the ER, urgent care clinic since your last visit? Hospitalized since your last visit? No    2. Have you seen or consulted any other health care providers outside of the [address] [place] [address] since your last visit? Include any pap smears or colon screening.  No

## 2019-08-06 NOTE — PATIENT INSTRUCTIONS
Shingles: Care Instructions Your Care Instructions Shingles (herpes zoster) causes pain and a blistered rash. The rash can appear anywhere on the body but will be on only one side of the body, the left or right. It will be in a band, a strip, or a small area. The pain can be very severe. Shingles can also cause tingling or itching in the area of the rash. The blisters scab over after a few days and heal in 2 to 4 weeks. Medicines can help you feel better and may help prevent more serious problems caused by shingles. Shingles is caused by the same virus that causes chickenpox. When you have chickenpox, the virus gets into your nerve roots and stays there (becomes dormant) long after you get over the chickenpox. If the virus becomes active again, it can cause shingles. Follow-up care is a key part of your treatment and safety. Be sure to make and go to all appointments, and call your doctor if you are having problems. It's also a good idea to know your test results and keep a list of the medicines you take. How can you care for yourself at home? · Be safe with medicines. Take your medicines exactly as prescribed. Call your doctor if you think you are having a problem with your medicine. Antiviral medicine helps you get better faster. · Try not to scratch or pick at the blisters. They will crust over and fall off on their own if you leave them alone. · Put cool, wet cloths on the area to relieve pain and itching. You can also use calamine lotion. Try not to use so much lotion that it cakes and is hard to get off. · Put cornstarch or baking soda on the sores to help dry them out so they heal faster. · Do not use thick ointment, such as petroleum jelly, on the sores. This will keep them from drying and healing. · To help remove loose crusts, soak them in tap water. This can help decrease oozing, and dry and soothe the skin.  
· Take an over-the-counter pain medicine, such as acetaminophen (Tylenol), ibuprofen (Advil, Motrin), or naproxen (Aleve). Read and follow all instructions on the label. · Avoid close contact with people until the blisters have healed. It is very important for you to avoid contact with anyone who has never had chickenpox or the chickenpox vaccine. Pregnant women, young babies, and anyone else who has a hard time fighting infection (such as someone with HIV, diabetes, or cancer) is especially at risk. When should you call for help? Call your doctor now or seek immediate medical care if: 
  · You have a new or higher fever.  
  · You have a severe headache and a stiff neck.  
  · You lose the ability to think clearly.  
  · The rash spreads to your forehead, nose, eyes, or eyelids.  
  · You have eye pain, or your vision gets worse.  
  · You have new pain in your face, or you cannot move the muscles in your face.  
  · Blisters spread to new parts of your body.  
 Watch closely for changes in your health, and be sure to contact your doctor if: 
  · The rash has not healed after 2 to 4 weeks.  
  · You still have pain after the rash has healed. Where can you learn more? Go to http://jaceZeuskatherine.info/. Mahendra Griffin in the search box to learn more about \"Shingles: Care Instructions. \" Current as of: July 30, 2018 Content Version: 12.1 © 0726-6944 [employer]. Care instructions adapted under license by [employer] (which disclaims liability or warranty for this information). If you have questions about a medical condition or this instruction, always ask your healthcare professional. [place] [address] any warranty or liability for your use of this information. Recombinant Zoster (Shingles) Vaccine, RZV: What you need to know Why get vaccinated? Shingles (also called herpes zoster, or just zoster) is a painful skin rash, often with blisters.  Shingles is caused by the varicella zoster virus, the same virus that causes chickenpox. After you have chickenpox, the virus stays in your body and can cause shingles later in life. You can't catch shingles from another person. However, a person who has never had chickenpox (or chickenpox vaccine) could get chickenpox from someone with shingles. A shingles rash usually appears on one side of the face or body and heals within 2 to 4 weeks. Its main symptom is pain, which can be severe. Other symptoms can include fever, headache, chills, and upset stomach. Very rarely, a shingles infection can lead to pneumonia, hearing problems, blindness, brain inflammation (encephalitis), or death. For about 1 person in 5, severe pain can continue even long after the rash has cleared up. This long-lasting pain is called post-herpetic neuralgia (PHN). Shingles is far more common in people 48years of age and older than in younger people, and the risk increases with age. It is also more common in people whose immune system is weakened because of a disease such as cancer, or by drugs such as steroids or chemotherapy. At least 1 million people a year in the [facility] get shingles. Shingles vaccine (recombinant) Recombinant shingles vaccine was approved by FDA in 2017 for the prevention of shingles. In clinical trials, it was more than 90% effective in preventing shingles. It can also reduce the likelihood of PHN. Two doses, 2 to 6 months apart, are recommended for adults 48 and older. This vaccine is also recommended for people who have already gotten the live shingles vaccine (Zostavax). There is no live virus in this vaccine. Some people should not get this vaccine Tell your vaccine provider if you: 
· Have any severe, life-threatening allergies.  A person who has ever had a life-threatening allergic reaction after a dose of recombinant shingles vaccine, or has a severe allergy to any component of this vaccine, may be advised not to be vaccinated. Ask your health care provider if you want information about vaccine components. · Are pregnant or breastfeeding. There is not much information about use of recombinant shingles vaccine in pregnant or nursing women. Your healthcare provider might recommend delaying vaccination. · Are not feeling well. If you have a mild illness, such as a cold, you can probably get the vaccine today. If you are moderately or severely ill, you should probably wait until you recover. Your doctor can advise you. Risks of a vaccine reaction With any medicine, including vaccines, there is a chance of reactions. After recombinant shingles vaccination, a person might experience: 
· Pain, redness, soreness, or swelling at the site of the injection · Headache, muscle aches, fever, shivering, fatigue In clinical trials, most people got a sore arm with mild or moderate pain after vaccination, and some also had redness and swelling where they got the shot. Some people felt tired, had muscle pain, a headache, shivering, fever, stomach pain, or nausea. About 1 out of 6 people who got recombinant zoster vaccine experienced side effects that prevented them from doing regular activities. Symptoms went away on their own in about 2 to 3 days. Side effects were more common in younger people. You should still get the second dose of recombinant zoster vaccine even if you had one of these reactions after the first dose. Other things that could happen after this vaccine: · People sometimes faint after medical procedures, including vaccination. Sitting or lying down for about 15 minutes can help prevent fainting and injuries caused by a fall. Tell your provider if you feel dizzy or have vision changes or ringing in the ears. · Some people get shoulder pain that can be more severe and longer-lasting than routine soreness that can follow injections. This happens very rarely. · Any medication can cause a severe allergic reaction. Such reactions to a vaccine are estimated at about 1 in a million doses, and would happen within a few minutes to a few hours after the vaccination. As with any medicine, there is a very remote chance of a vaccine causing a serious injury or death. The safety of vaccines is always being monitored. For more information, visit: www.cdc.gov/vaccinesafety/ What if there is a serious problem? What should I look for? · Look for anything that concerns you, such as signs of a severe allergic reaction, very high fever, or unusual behavior. Signs of a severe allergic reaction can include hives, swelling of the face and throat, difficulty breathing, a fast heartbeat, dizziness, and weakness. These would usually start a few minutes to a few hours after the vaccination. What should I do? · If you think it is a severe allergic reaction or other emergency that can't wait, call 9-1-1 or get to the nearest hospital. Otherwise, call your health care provider. · Afterward, the reaction should be reported to the Vaccine Adverse Event Reporting System (VAERS). Your doctor should file this report, or you can do it yourself through the VAERS website at www.vaers. [facility].gov, or by calling 8-518.374.2564. [employer] does not give medical advice. Fadi Rodriguez How can I learn more? · Ask your health care provider. He or she can give you the vaccine package insert or suggest other sources of information. · Call your local or state health department. · Contact the Centers for Disease Control and Prevention (CDC): 
? Call 8-263.228.5753 (1-800-CDC-INFO) or 
? Visit CDC's website at www.cdc.gov/vaccines Vaccine Information Statement (Interim) Recombinant Zoster Vaccine 2/12/2018 Department of [facility] and [employer] Centers for Disease Control and Prevention Many Vaccine Information Statements are available in Korean and other languages. See www.immunize.org/vis. Hojas de Información Sobre Vacunas están disponibles en Español y en muchos otros idiomas. Visite [employer] Care instructions adapted under license by [employer] (which disclaims liability or warranty for this information). If you have questions about a medical condition or this instruction, always ask your healthcare professional. [address] any warranty or liability for your use of this information.

## 2019-08-30 ENCOUNTER — OFFICE VISIT (OUTPATIENT)
Dept: ORTHOPEDIC SURGERY | Age: 59
End: 2019-08-30

## 2019-08-30 VITALS
TEMPERATURE: 98 F | HEIGHT: 71 IN | RESPIRATION RATE: 15 BRPM | WEIGHT: 213 LBS | HEART RATE: 81 BPM | BODY MASS INDEX: 29.82 KG/M2 | SYSTOLIC BLOOD PRESSURE: 173 MMHG | DIASTOLIC BLOOD PRESSURE: 98 MMHG

## 2019-08-30 DIAGNOSIS — G62.9 NEUROPATHY: ICD-10-CM

## 2019-08-30 DIAGNOSIS — M50.30 DEGENERATIVE, INTERVERTEBRAL DISC, CERVICAL: ICD-10-CM

## 2019-08-30 DIAGNOSIS — G56.01 CARPAL TUNNEL SYNDROME ON RIGHT: Primary | ICD-10-CM

## 2019-08-30 DIAGNOSIS — G56.02 CARPAL TUNNEL SYNDROME ON LEFT: ICD-10-CM

## 2019-08-30 RX ORDER — GABAPENTIN 400 MG/1
800 CAPSULE ORAL 3 TIMES DAILY
Qty: 240 CAP | Refills: 0 | Status: SHIPPED | OUTPATIENT
Start: 2019-08-30 | End: 2020-01-24 | Stop reason: ALTCHOICE

## 2019-08-30 NOTE — PROGRESS NOTES
AVS was reviewed with patient. Patient understood instructions to take hard copy of his prescription to be filled by his pharmacy of choice and to return if his symptoms worsen or fail to get better.

## 2019-08-30 NOTE — Clinical Note
I will Rx his gabapentin 400mg today but it would be ideal if you were to continue that for him long therm as you had been giving it to him prior. Any questions/issues, please let me know.

## 2019-08-30 NOTE — PROGRESS NOTES
HISTORY OF PRESENT ILLNESS    Linda Menjivar is a 63y.o. year old male comes in today to be evaluated and treated for: carpal tunnel right>left    Since last appt has noticed improvement with splint and predniosne. Pain level 3/10. Would like another Rx for inflammation for maintenance. Has discussed need for neurontin refill from PCP and hoping I will Rx. EMG Dr. Adin Linda 6/19/19  INTERPRETATION: Abnormal nerve conduction and EMG study showing signs of bilateral prolongation of the distal latency in the median nerves, worse on the right and on the left, consistent with bilateral carpal tunnel syndrome. Past Surgical History:   Procedure Laterality Date    HX BACK SURGERY  2014    HX CORONARY STENT PLACEMENT  2012    No stent placed     Social History     Socioeconomic History    Marital status: LEGALLY      Spouse name: Not on file    Number of children: Not on file    Years of education: Not on file    Highest education level: Not on file   Tobacco Use    Smoking status: Never Smoker    Smokeless tobacco: Never Used   Substance and Sexual Activity    Alcohol use: Yes     Alcohol/week: 63.0 standard drinks     Types: 63 Cans of beer per week    Drug use: No     Types: Benzodiazepines    Sexual activity: Not Currently   Social History Narrative     X 2. Lives with wife and 21year old son. 6year old son was taken away by . Unemployed      Current Outpatient Medications   Medication Sig Dispense Refill    metFORMIN (GLUCOPHAGE) 1,000 mg tablet TAKE 1 TABLET BY MOUTH TWICE A DAY WITH MEALS 180 Tab 1    ferrous sulfate (IRON) 325 mg (65 mg iron) EC tablet take 1 tablet once daily 90 Tab 1    dilTIAZem CD (CARDIZEM CD) 120 mg ER capsule Take 1 Cap by mouth daily. Indications: Ventricular Rate Control in Atrial Fibrillation 90 Cap 3    magnesium oxide (MAG-OX) 400 mg tablet Take 1 Tab by mouth daily.  Indications: low amount of magnesium in the blood 90 Tab 2    lisinopril-hydroCHLOROthiazide (PRINZIDE, ZESTORETIC) 20-12.5 mg per tablet take 2 tablets by mouth once daily 180 Tab 1    gabapentin (NEURONTIN) 400 mg capsule Take 2 Caps by mouth three (3) times daily. 60 Cap 2    glimepiride (AMARYL) 2 mg tablet take 1 tablet by mouth every morning 90 Tab 1    aspirin delayed-release 81 mg tablet Take  by mouth daily.  IBUPROFEN PO Take 800 mg by mouth three (3) times daily as needed for Pain.  QUEtiapine SR (SEROQUEL XR) 300 mg sr tablet Take 2 Tabs by mouth nightly. 180 Tab 0    glucose blood VI test strips (ASCENSIA AUTODISC VI, ONE TOUCH ULTRA TEST VI) strip Use as directed to test blood sugar twice a day 100 Strip 6    capsicum oleoresin 0.025 % topical cream Apply  to affected area three (3) times daily. 60 g 0     Past Medical History:   Diagnosis Date    Alcohol abuse 8/22/2013    CAD (coronary artery disease)     \"cardiac stent placed 1 year ago\"    Depression     Diastolic dysfunction 5/7/7445    DM[facility]     Hypertension     Mild concentric left ventricular hypertrophy (LVH) 7/9/2018    Mitral regurgitation 7/9/2018    Substance abuse ([facility] [address]     Suicidal thoughts     Tricuspid regurgitation 9/9/4519    Umbilical hernia      Family History   Problem Relation Age of Onset    Suicide Father     Diabetes Brother     Psychiatric Disorder Other      ROS:  See HPI. Some hand locking. Objective:  BP (!) 173/98   Pulse 81   Temp 98 °F (36.7 °C)   Resp 15   Ht 5' 11\" (1.803 m)   Wt 213 lb (96.6 kg)   BMI 29.71 kg/m²   GEN:  Appears stated age in NAD. HEAD:  Normocephalic, Atraumatic. NEURO:  Sensation intact light touch upper and lower extremities. Biceps & Triceps reflexes +2/4 bilaterally. right hand dominant. M/S:  right elbow/wrist: Negative luba tenderness. Phalen's negative. Tinel's negative.   Strength +5/5 bilateral .  Piano key sign Negative bilateral .  Carpal bone motion normal. Finklestein's negative  TFCC Load Test negative. BILATERAL neither epicondyle(s) without TTP not changed with wrist extension. negative muscular atrophy. EXT:  no clubbing/cyanosis. no edema. Assessment/Plan:     ICD-10-CM ICD-9-CM    1. Carpal tunnel syndrome on right G56.01 354.0    2. Neuropathy G62.9 355.9 gabapentin (NEURONTIN) 400 mg capsule   3. Carpal tunnel syndrome on left G56.02 354.0    4. Degenerative, intervertebral disc, cervical M50.30 722.4        Patient (or guardian if minor) verbalizes understanding of evaluation and plan. Will refill neurontin to allow enough for PCP refill as scheduled and will continue current for carpal tunnel and RTC as needed.  reviewed.

## 2019-10-07 DIAGNOSIS — F32.A DEPRESSION, UNSPECIFIED DEPRESSION TYPE: ICD-10-CM

## 2019-10-07 DIAGNOSIS — G47.00 INSOMNIA, UNSPECIFIED TYPE: ICD-10-CM

## 2019-10-15 RX ORDER — QUETIAPINE 300 MG/1
600 TABLET, FILM COATED, EXTENDED RELEASE ORAL
Qty: 180 TAB | Refills: 1 | Status: SHIPPED | OUTPATIENT
Start: 2019-10-15 | End: 2020-01-23 | Stop reason: ALTCHOICE

## 2019-10-16 DIAGNOSIS — G62.9 NEUROPATHY: ICD-10-CM

## 2019-10-24 RX ORDER — GABAPENTIN 400 MG/1
800 CAPSULE ORAL 3 TIMES DAILY
Qty: 240 CAP | Refills: 0 | OUTPATIENT
Start: 2019-10-24

## 2020-01-23 ENCOUNTER — OFFICE VISIT (OUTPATIENT)
Dept: FAMILY MEDICINE CLINIC | Age: 60
End: 2020-01-23

## 2020-01-23 VITALS
SYSTOLIC BLOOD PRESSURE: 103 MMHG | RESPIRATION RATE: 14 BRPM | WEIGHT: 214.2 LBS | BODY MASS INDEX: 29.99 KG/M2 | TEMPERATURE: 97.6 F | HEART RATE: 86 BPM | DIASTOLIC BLOOD PRESSURE: 61 MMHG | HEIGHT: 71 IN | OXYGEN SATURATION: 96 %

## 2020-01-23 DIAGNOSIS — J06.9 UPPER RESPIRATORY TRACT INFECTION, UNSPECIFIED TYPE: Primary | ICD-10-CM

## 2020-01-23 DIAGNOSIS — I10 ESSENTIAL HYPERTENSION WITH GOAL BLOOD PRESSURE LESS THAN 130/80: ICD-10-CM

## 2020-01-23 DIAGNOSIS — E11.42 TYPE 2 DIABETES MELLITUS WITH DIABETIC POLYNEUROPATHY, WITHOUT LONG-TERM CURRENT USE OF INSULIN (HCC): ICD-10-CM

## 2020-01-23 DIAGNOSIS — J31.0 RHINITIS, UNSPECIFIED TYPE: ICD-10-CM

## 2020-01-23 DIAGNOSIS — G47.00 INSOMNIA, UNSPECIFIED TYPE: ICD-10-CM

## 2020-01-23 DIAGNOSIS — F32.A DEPRESSION, UNSPECIFIED DEPRESSION TYPE: ICD-10-CM

## 2020-01-23 LAB — HBA1C MFR BLD HPLC: 7.9 %

## 2020-01-23 RX ORDER — PSEUDOEPHEDRINE HCL 30 MG
30 TABLET ORAL
Qty: 30 TAB | Refills: 0 | Status: SHIPPED | OUTPATIENT
Start: 2020-01-23 | End: 2020-01-23 | Stop reason: SDUPTHER

## 2020-01-23 RX ORDER — BENZONATATE 200 MG/1
200 CAPSULE ORAL
Qty: 30 CAP | Refills: 0 | Status: SHIPPED | OUTPATIENT
Start: 2020-01-23 | End: 2020-01-23 | Stop reason: SDUPTHER

## 2020-01-23 RX ORDER — BLOOD-GLUCOSE METER
EACH MISCELLANEOUS
Qty: 1 EACH | Refills: 0 | Status: SHIPPED | OUTPATIENT
Start: 2020-01-23 | End: 2020-07-14 | Stop reason: SDUPTHER

## 2020-01-23 RX ORDER — GLIMEPIRIDE 1 MG/1
1 TABLET ORAL
Qty: 30 TAB | Refills: 5 | Status: SHIPPED | OUTPATIENT
Start: 2020-01-23 | End: 2020-01-23 | Stop reason: SDUPTHER

## 2020-01-23 RX ORDER — AZELASTINE 1 MG/ML
1 SPRAY, METERED NASAL 2 TIMES DAILY
Qty: 1 BOTTLE | Refills: 5 | Status: SHIPPED | OUTPATIENT
Start: 2020-01-23 | End: 2020-01-23 | Stop reason: SDUPTHER

## 2020-01-23 RX ORDER — LISINOPRIL 10 MG/1
10 TABLET ORAL DAILY
Qty: 30 TAB | Refills: 5 | Status: SHIPPED | OUTPATIENT
Start: 2020-01-23 | End: 2020-09-14

## 2020-01-23 RX ORDER — DOXEPIN HYDROCHLORIDE 100 MG/1
100 CAPSULE ORAL
Qty: 30 CAP | Refills: 5 | Status: SHIPPED | OUTPATIENT
Start: 2020-01-23 | End: 2020-02-28

## 2020-01-23 RX ORDER — PSEUDOEPHEDRINE HCL 30 MG
30 TABLET ORAL
Qty: 30 TAB | Refills: 0 | Status: SHIPPED | OUTPATIENT
Start: 2020-01-23 | End: 2020-03-03

## 2020-01-23 RX ORDER — GLIMEPIRIDE 1 MG/1
1 TABLET ORAL
Qty: 30 TAB | Refills: 5 | Status: SHIPPED | OUTPATIENT
Start: 2020-01-23 | End: 2020-02-28 | Stop reason: DRUGHIGH

## 2020-01-23 RX ORDER — DOXEPIN HYDROCHLORIDE 100 MG/1
100 CAPSULE ORAL
Qty: 30 CAP | Refills: 5 | Status: SHIPPED | OUTPATIENT
Start: 2020-01-23 | End: 2020-01-23 | Stop reason: SDUPTHER

## 2020-01-23 RX ORDER — BENZONATATE 200 MG/1
200 CAPSULE ORAL
Qty: 30 CAP | Refills: 0 | Status: SHIPPED | OUTPATIENT
Start: 2020-01-23 | End: 2020-03-02

## 2020-01-23 RX ORDER — AZELASTINE 1 MG/ML
1 SPRAY, METERED NASAL 2 TIMES DAILY
Qty: 1 BOTTLE | Refills: 5 | Status: SHIPPED | OUTPATIENT
Start: 2020-01-23 | End: 2020-03-02

## 2020-01-23 RX ORDER — DULOXETIN HYDROCHLORIDE 60 MG/1
60 CAPSULE, DELAYED RELEASE ORAL DAILY
Qty: 30 CAP | Refills: 5 | Status: SHIPPED | OUTPATIENT
Start: 2020-01-23 | End: 2020-02-28 | Stop reason: SDUPTHER

## 2020-01-23 RX ORDER — LISINOPRIL 10 MG/1
10 TABLET ORAL DAILY
Qty: 30 TAB | Refills: 5 | Status: SHIPPED | OUTPATIENT
Start: 2020-01-23 | End: 2020-01-23 | Stop reason: SDUPTHER

## 2020-01-23 NOTE — PROGRESS NOTES
[facility]    CC: Cough    HPI:     Cough:  - Timing/onset: Issue for ~3.5 weeks  - Duration: All night  - Quality: Small amount of phlegm (Initially was yellow, but has turned clear)  - Context: Occurs at night when laying down  - Progression/Course: Improving  - Tried: Kiarra Plus: Cold, Dayquil, and Nyquil      DMII:  -Had stopped taking glimepiride due to issues with dizziness (Was trying to identify cause)  -Checking blood sugar at home. No log brought in for review.   -Reports FBS range of 315-330  -Admits that he has been eat more sugar  -Not following any regular exercise regimen, but is physically active at work      Depression/Insomnia:  -Seroquel helps him sleep at night, but he remains sedated in the morning  -Has reduced dose to 300 mg, but continues to have issue  -Denies any depression      HTN:  -Has halved the dose of his lisinopril-HCTZ regimen due to issues with dizziness (Was trying to identify cause)  -Does not check BP at home  -Not following any regular exercise regimen, but is physically active at work      ROS: Positive items marked in RED  CON: fever, chills  Cardiovascular: palpitations, CP  Resp: SOB, cough  GI: nausea, vomiting, diarrhea  : dysuria, hematuria      Past Medical History:   Diagnosis Date    Alcohol abuse 8/22/2013    CAD (coronary artery disease)     \"cardiac stent placed 1 year ago\"    Depression     Diastolic dysfunction 9/9/4197    [facility], [facility]) [facility] [address]     Hypertension     Mild concentric left ventricular hypertrophy (LVH) 7/9/2018    Mitral regurgitation 7/9/2018    Substance abuse ([facility] [address] [facility]     Suicidal thoughts     Tricuspid regurgitation 5/4/0408    Umbilical hernia        Past Surgical History:   Procedure Laterality Date    HX BACK SURGERY  2014    HX CORONARY STENT PLACEMENT  2012    No stent placed       Family History   Problem Relation Age of Onset    Suicide Father     Diabetes Brother     Psychiatric Disorder Other        Social History     Socioeconomic History    Marital status: LEGALLY      Spouse name: Not on file    Number of children: Not on file    Years of education: Not on file    Highest education level: Not on file   Tobacco Use    Smoking status: Never Smoker    Smokeless tobacco: Never Used   Substance and Sexual Activity    Alcohol use: Yes     Alcohol/week: 63.0 standard drinks     Types: 63 Cans of beer per week    Drug use: No     Types: Benzodiazepines    Sexual activity: Not Currently   Social History Narrative     X 2. Lives with wife and 21year old son. 6year old son was taken away by . Unemployed        Allergies   Allergen Reactions    Aleve [Naproxen Sodium] Itching and Hives    Naproxen Hives         Current Outpatient Medications:     QUEtiapine SR (SEROQUEL XR) 300 mg sr tablet, Take 2 Tabs by mouth nightly., Disp: 180 Tab, Rfl: 1    gabapentin (NEURONTIN) 400 mg capsule, Take 2 Caps by mouth three (3) times daily. , Disp: 240 Cap, Rfl: 0    metFORMIN (GLUCOPHAGE) 1,000 mg tablet, TAKE 1 TABLET BY MOUTH TWICE A DAY WITH MEALS, Disp: 180 Tab, Rfl: 1    ferrous sulfate (IRON) 325 mg (65 mg iron) EC tablet, take 1 tablet once daily, Disp: 90 Tab, Rfl: 1    dilTIAZem CD (CARDIZEM CD) 120 mg ER capsule, Take 1 Cap by mouth daily. Indications: Ventricular Rate Control in Atrial Fibrillation, Disp: 90 Cap, Rfl: 3    magnesium oxide (MAG-OX) 400 mg tablet, Take 1 Tab by mouth daily. Indications: low amount of magnesium in the blood, Disp: 90 Tab, Rfl: 2    lisinopril-hydroCHLOROthiazide (PRINZIDE, ZESTORETIC) 20-12.5 mg per tablet, take 2 tablets by mouth once daily, Disp: 180 Tab, Rfl: 1    glucose blood VI test strips (ASCENSIA AUTODISC VI, ONE TOUCH ULTRA TEST VI) strip, Use as directed to test blood sugar twice a day, Disp: 100 Strip, Rfl: 6    capsicum oleoresin 0.025 % topical cream, Apply  to affected area three (3) times daily. , Disp: 60 g, Rfl: 0    glimepiride (AMARYL) 2 mg tablet, take 1 tablet by mouth every morning, Disp: 90 Tab, Rfl: 1    aspirin delayed-release 81 mg tablet, Take  by mouth daily. , Disp: , Rfl:     IBUPROFEN PO, Take 800 mg by mouth three (3) times daily as needed for Pain., Disp: , Rfl:     Physical Exam:      /61   Pulse 86   Temp 97.6 °F (36.4 °C) (Oral)   Resp 14   Ht 5' 11\" (1.803 m)   Wt 214 lb 3.2 oz (97.2 kg)   SpO2 96%   BMI 29.87 kg/m²     General: obese habitus, NAD, conversant  Eyes: sclera clear bilaterally, no discharge noted, eyelids normal in appearance  HENT: NCAT, nasal turbinates enlarged bilaterally  Lungs: CTAB, normal respiratory effort and rate  CV: RRR, no MRGs  ABD: soft, non-tender, non-distended, normal bowel sounds  Skin: normal temperature, turgor, color, and texture  Psych: alert and oriented to person, place and situation, normal affect  Neuro: speech normal, moving all extremities, gait normal    Results for Rosemary Grossman (MRN [id number]):   Ref.  Range 1/23/2020 12:58   Hemoglobin A1c (POC) Latest Units: % 7.9       Assessment/Plan     URI:  -Likely viral etiology  -Started on Tessalon, Sudafed, and azelastine regimen  -F/U in 1 month (Or sooner if needed)      DMII, Inadequately Controlled:  -HGBA1c not at goal of <7%  -Patient advised to resume prior diabetic diet as he previously had his diabetes very well controlled  -Will reduce prior glimepiride regimen to 1 mg given that diabetes was very well controlled previously with hemoglobin of less than 6%  -F/U in 1 month        HLD, Inadequately Controlled:  -10 year ASCVD risk of 13.9%  -Unable to discuss statin therapy during visit due to lack of time  -Plan to discuss at next visit  -F/U in 1 month       Depression/Insomnia[de-identified]  -Will discontinue Seroquel regimen given side effect  -Started on doxepin regimen  -F/U in 1 month      Neuropathy:  -Patient advised that I do not prescribe controlled substances for chronic pain management  -Gabapentin discontinued  -Started on duloxetine regimen  -Follow-up in 1 month        Lawyer Hernesto MD  1/23/2020, 12:42 PM

## 2020-01-23 NOTE — PROGRESS NOTES
1. Have you been to the ER, urgent care clinic since your last visit? Hospitalized since your last visit? No    2. Have you seen or consulted any other health care providers outside of the [address] [place] [address] since your last visit? Include any pap smears or colon screening.  No

## 2020-02-20 DIAGNOSIS — J06.9 UPPER RESPIRATORY TRACT INFECTION, UNSPECIFIED TYPE: ICD-10-CM

## 2020-02-28 ENCOUNTER — OFFICE VISIT (OUTPATIENT)
Dept: FAMILY MEDICINE CLINIC | Age: 60
End: 2020-02-28

## 2020-02-28 VITALS
BODY MASS INDEX: 29.96 KG/M2 | HEIGHT: 71 IN | DIASTOLIC BLOOD PRESSURE: 90 MMHG | SYSTOLIC BLOOD PRESSURE: 126 MMHG | RESPIRATION RATE: 16 BRPM | HEART RATE: 92 BPM | TEMPERATURE: 97.4 F | WEIGHT: 214 LBS

## 2020-02-28 DIAGNOSIS — R53.83 FATIGUE, UNSPECIFIED TYPE: ICD-10-CM

## 2020-02-28 DIAGNOSIS — E11.69 TYPE 2 DIABETES MELLITUS WITH OTHER SPECIFIED COMPLICATION, WITHOUT LONG-TERM CURRENT USE OF INSULIN (HCC): ICD-10-CM

## 2020-02-28 DIAGNOSIS — G62.9 NEUROPATHY: ICD-10-CM

## 2020-02-28 DIAGNOSIS — I10 ESSENTIAL HYPERTENSION WITH GOAL BLOOD PRESSURE LESS THAN 130/80: Primary | ICD-10-CM

## 2020-02-28 RX ORDER — GLIMEPIRIDE 4 MG/1
4 TABLET ORAL
Qty: 60 TAB | Refills: 5 | Status: SHIPPED | OUTPATIENT
Start: 2020-02-28 | End: 2020-04-06 | Stop reason: SDUPTHER

## 2020-02-28 RX ORDER — DULOXETIN HYDROCHLORIDE 60 MG/1
120 CAPSULE, DELAYED RELEASE ORAL DAILY
Qty: 60 CAP | Refills: 5 | Status: SHIPPED | OUTPATIENT
Start: 2020-02-28 | End: 2020-12-14

## 2020-02-28 NOTE — PROGRESS NOTES
1. Have you been to the ER, urgent care clinic since your last visit? Hospitalized since your last visit? No    2. Have you seen or consulted any other health care providers outside of the [address] [place] [address] since your last visit? Include any pap smears or colon screening.  No

## 2020-03-02 NOTE — PROGRESS NOTES
Saurabh Bundy    CC: Follow-up for chronic disease management    HPI:     Neuropathy:  -Taking Cymbalta as prescribed  -Has not noticed much difference  -Denies any side effects or issues with the medication      DMII:  -Reports taking 2000 mg of metformin twice daily and 3 mg of glimepiride daily  -Denies any known side effects from medications, but does report dealing with extreme fatigue  -Has been checking blood sugar at home. No log brought in for review. -Reports FBS has gone down to 190  -Not following a regular exercise regimen      HTN:  -Not taking BP medication as prescribed  -Has been checking BP at home and reports it has been fine.   No log brought in for review  -Not following any regular exercise regimen  Health Maintenance:      ROS: Positive items marked in RED  CON: fever, chills  Cardiovascular: palpitations, CP  Resp: SOB, cough  GI: nausea, vomiting, diarrhea  : dysuria, hematuria      Past Medical History:   Diagnosis Date    Alcohol abuse 8/22/2013    CAD (coronary artery disease)     \"cardiac stent placed 1 year ago\"    Depression     Diastolic dysfunction 6/8/0965    [facility], [facility]) [facility] [address]     Hypertension     Mild concentric left ventricular hypertrophy (LVH) 7/9/2018    Mitral regurgitation 7/9/2018    Substance abuse ([facility] [address]     Suicidal thoughts     Tricuspid regurgitation 6/3/2007    Umbilical hernia        Past Surgical History:   Procedure Laterality Date    HX BACK SURGERY  2014    HX CORONARY STENT PLACEMENT  2012    No stent placed       Family History   Problem Relation Age of Onset    Suicide Father     Diabetes Brother     Psychiatric Disorder Other        Social History     Socioeconomic History    Marital status: LEGALLY      Spouse name: Not on file    Number of children: Not on file    Years of education: Not on file    Highest education level: Not on file   Tobacco Use    Smoking status: Never Smoker    Smokeless tobacco: Never Used   Substance and Sexual Activity    Alcohol use: Yes     Alcohol/week: 63.0 standard drinks     Types: 63 Cans of beer per week    Drug use: No     Types: Benzodiazepines    Sexual activity: Not Currently   Social History Narrative     X 2. Lives with wife and 21year old son. 6year old son was taken away by . Unemployed        Allergies   Allergen Reactions    Aleve [Naproxen Sodium] Itching and Hives    Naproxen Hives         Current Outpatient Medications:     glimepiride (AMARYL) 4 mg tablet, Take 1 Tab by mouth every morning., Disp: 60 Tab, Rfl: 5    DULoxetine (CYMBALTA) 60 mg capsule, Take 2 Caps by mouth daily. Indications: neuropathic pain, Disp: 60 Cap, Rfl: 5    glucose blood VI test strips (ONETOUCH VERIO) strip, Use to check blood sugar daily, Disp: 100 Strip, Rfl: 5    Blood-Glucose Meter (ONETOUCH VERIO IQ METER) misc, Use to check blood sugar daily, Disp: 1 Each, Rfl: 0    metFORMIN (GLUCOPHAGE) 1,000 mg tablet, TAKE 1 TABLET BY MOUTH TWICE A DAY WITH MEALS (Patient taking differently: 2,000 mg two (2) times a day. TAKE 1 TABLET BY MOUTH TWICE A DAY WITH MEALS), Disp: 180 Tab, Rfl: 1    ferrous sulfate (IRON) 325 mg (65 mg iron) EC tablet, take 1 tablet once daily, Disp: 90 Tab, Rfl: 1    dilTIAZem CD (CARDIZEM CD) 120 mg ER capsule, Take 1 Cap by mouth daily. Indications: Ventricular Rate Control in Atrial Fibrillation, Disp: 90 Cap, Rfl: 3    magnesium oxide (MAG-OX) 400 mg tablet, Take 1 Tab by mouth daily. Indications: low amount of magnesium in the blood, Disp: 90 Tab, Rfl: 2    glucose blood VI test strips (ASCENSIA AUTODISC VI, ONE TOUCH ULTRA TEST VI) strip, Use as directed to test blood sugar twice a day, Disp: 100 Strip, Rfl: 6    aspirin delayed-release 81 mg tablet, Take  by mouth daily. , Disp: , Rfl:     azelastine (ASTELIN) 137 mcg (0.1 %) nasal spray, 1 [place] by Both Nostrils route two (2) times a day.  Use in each nostril as directed, Disp: 1 Bottle, Rfl: 5    benzonatate (TESSALON) 200 mg capsule, Take 1 Cap by mouth three (3) times daily as needed for Cough. , Disp: 30 Cap, Rfl: 0    lisinopril (PRINIVIL, ZESTRIL) 10 mg tablet, Take 1 Tab by mouth daily. , Disp: 30 Tab, Rfl: 5    pseudoephedrine (SUDAFED) 30 mg tablet, Take 1 Tab by mouth every four (4) hours as needed for Congestion. , Disp: 30 Tab, Rfl: 0    IBUPROFEN PO, Take 800 mg by mouth three (3) times daily as needed for Pain., Disp: , Rfl:     Physical Exam:      /90   Pulse 92   Temp 97.4 °F (36.3 °C) (Oral)   Resp 16   Ht 5' 11\" (1.803 m)   Wt 214 lb (97.1 kg)   BMI 29.85 kg/m²     General:  WD, WN, NAD, conversant  Eyes: sclera clear bilaterally, no discharge noted, eyelids normal in appearance  HENT: NCAT  Lungs: CTAB, normal respiratory effort and rate  CV: RRR, no MRGs  ABD: soft, non-tender, non-distended, normal bowel sounds  Skin: normal temperature, turgor, color, and texture  Psych: alert and oriented to person, place and situation, normal affect  Neuro: speech normal, moving all extremities, gait normal      Assessment/Plan     DMII, Inadequately Controlled:  -Reported FBS not at goal of less than 130  -Instructed to return to taking prior metformin dose of 1000 mg twice daily  -Glimepiride dose increased to 4 mg daily  -Plan for urine microalbumin/creatinine at next visit  -Follow-up in 1 month        Hypertension, likely inadequately controlled:  -BP not at goal of less than 130/80  -Suspect this is due to him not taking his medication as prescribed  -Will defer resuming BP medication  -Plan to discuss resuming BP medication if it still remains not at goal at next visit  -Follow-up in 1 month      Fatigue:  -Concerned the patient may have developed lactic acidosis given dose of metformin he has been taking  -Advised to return to prior prescribed dose of metformin  -CMP and lactic acid level ordered  -Follow-up in 1 month       Neuropathy, Unchanged:  -Will increase duloxetine dose to 120 mg daily  -Follow-up in 1 month        Diony Latham MD  2/28/2020

## 2020-03-03 RX ORDER — DEXTROMETHORPHAN HYDROBROMIDE, DOXYLAMINE SUCCINATE 15; 135 MG/15ML; MG/5ML
LIQUID ORAL
Qty: 24 TAB | Refills: 0 | Status: SHIPPED | OUTPATIENT
Start: 2020-03-03 | End: 2020-09-14

## 2020-03-04 LAB
ALB/GLOBRATIO, 58C: 1 (CALC) (ref 1–2.5)
ALBUMIN SERPL-MCNC: 3.6 G/DL (ref 3.6–5.1)
ALP SERPL-CCNC: 187 U/L (ref 35–144)
ALT SERPL-CCNC: 59 U/L (ref 9–46)
AST SERPL W P-5'-P-CCNC: 57 U/L (ref 10–35)
BILIRUB SERPL-MCNC: 1 MG/DL (ref 0.2–1.2)
BUN SERPL-MCNC: 11 MG/DL (ref 7–25)
BUN/CREATININE RATIO,BUCR: ABNORMAL (CALC) (ref 6–22)
CALCIUM SERPL-MCNC: 9.4 MG/DL (ref 8.6–10.3)
CHLORIDE SERPL-SCNC: 100 MMOL/L (ref 98–110)
CO2 SERPL-SCNC: 27 MMOL/L (ref 20–32)
CREAT SERPL-MCNC: 0.79 MG/DL (ref 0.7–1.33)
GLOBULIN,GLOB: 3.6 G/DL (CALC) (ref 1.9–3.7)
GLUCOSE SERPL-MCNC: 397 MG/DL (ref 65–99)
LDH SERPL L TO P-CCNC: 233 U/L (ref 120–250)
POTASSIUM SERPL-SCNC: 3.8 MMOL/L (ref 3.5–5.3)
PROT SERPL-MCNC: 7.2 G/DL (ref 6.1–8.1)
SODIUM SERPL-SCNC: 137 MMOL/L (ref 135–146)

## 2020-03-19 RX ORDER — FERROUS SULFATE 325(65) MG
TABLET, DELAYED RELEASE (ENTERIC COATED) ORAL
Qty: 90 TAB | Refills: 1 | Status: SHIPPED | OUTPATIENT
Start: 2020-03-19 | End: 2020-09-18

## 2020-04-02 DIAGNOSIS — F32.A DEPRESSION, UNSPECIFIED DEPRESSION TYPE: ICD-10-CM

## 2020-04-02 DIAGNOSIS — G47.00 INSOMNIA, UNSPECIFIED TYPE: ICD-10-CM

## 2020-04-06 DIAGNOSIS — E11.9 TYPE 2 DIABETES MELLITUS WITHOUT COMPLICATION, WITHOUT LONG-TERM CURRENT USE OF INSULIN (HCC): ICD-10-CM

## 2020-04-06 DIAGNOSIS — E11.69 TYPE 2 DIABETES MELLITUS WITH OTHER SPECIFIED COMPLICATION, WITHOUT LONG-TERM CURRENT USE OF INSULIN (HCC): ICD-10-CM

## 2020-04-06 RX ORDER — GLIMEPIRIDE 2 MG/1
TABLET ORAL
Qty: 90 TAB | Refills: 1 | OUTPATIENT
Start: 2020-04-06

## 2020-04-06 RX ORDER — GLIMEPIRIDE 4 MG/1
4 TABLET ORAL
Qty: 60 TAB | Refills: 5 | Status: SHIPPED | OUTPATIENT
Start: 2020-04-06 | End: 2020-06-24 | Stop reason: DRUGHIGH

## 2020-04-16 RX ORDER — QUETIAPINE 300 MG/1
300 TABLET, FILM COATED, EXTENDED RELEASE ORAL
Qty: 90 TAB | Refills: 1 | Status: SHIPPED | OUTPATIENT
Start: 2020-04-16 | End: 2020-11-13 | Stop reason: SDUPTHER

## 2020-06-24 ENCOUNTER — VIRTUAL VISIT (OUTPATIENT)
Dept: FAMILY MEDICINE CLINIC | Age: 60
End: 2020-06-24

## 2020-06-24 DIAGNOSIS — E11.9 TYPE 2 DIABETES MELLITUS WITHOUT COMPLICATION, WITHOUT LONG-TERM CURRENT USE OF INSULIN (HCC): Primary | ICD-10-CM

## 2020-06-24 RX ORDER — GLIMEPIRIDE 4 MG/1
8 TABLET ORAL
Qty: 60 TAB | Refills: 3 | Status: SHIPPED | OUTPATIENT
Start: 2020-06-24 | End: 2021-02-20

## 2020-06-24 NOTE — PROGRESS NOTES
[facility]    CC: F/U of DMII    HPI:     Consent: Ginna Crain, who was seen by synchronous (real-time) audio-video technology, and/or his healthcare decision maker, is aware that this patient-initiated, Telehealth encounter on 6/24/2020 is a billable service, with coverage as determined by his insurance carrier. He is aware that he may receive a bill and has provided verbal consent to proceed: Yes. DMII:  -Got requested lab work  -Taking diabetic medications prescribed  -Denies any side effects or issues with his diabetic medication  -Has been checking blood sugar at home  -Reports that his average blood sugar is 380  -Endorses symptoms of fatigue and polydipsia      ROS: Positive items marked in RED  CON: fever, chills  Cardiovascular: palpitations, CP  Resp: SOB, cough  GI: nausea, vomiting, diarrhea  : dysuria, hematuria    Past Medical History:   Diagnosis Date    Alcohol abuse 8/22/2013    CAD (coronary artery disease)     \"cardiac stent placed 1 year ago\"    Depression     Diastolic dysfunction 8/7/3427    DMII [facility]diabetes mellitus, type 2) ([facility] [address]     Hypertension     Mild concentric left ventricular hypertrophy (LVH) 7/9/2018    Mitral regurgitation 7/9/2018    Substance abuse ([facility] [address]     Suicidal thoughts     Tricuspid regurgitation 0/0/7375    Umbilical hernia        Past Surgical History:   Procedure Laterality Date    HX BACK SURGERY  2014    HX CORONARY STENT PLACEMENT  2012    No stent placed       Family History   Problem Relation Age of Onset    Suicide Father     Diabetes Brother     Psychiatric Disorder Other        Social History     Tobacco Use    Smoking status: Never Smoker    Smokeless tobacco: Never Used   Substance Use Topics    Alcohol use:  Yes     Alcohol/week: 63.0 standard drinks     Types: 63 Cans of beer per week    Drug use: No     Types: Benzodiazepines       Allergies   Allergen Reactions    Aleve [Naproxen Sodium] Itching and Hives  Naproxen Hives         Current Outpatient Medications:     metFORMIN (GLUCOPHAGE) 1,000 mg tablet, take 1 tablet by mouth twice a day with meals, Disp: 180 Tab, Rfl: 1    QUEtiapine SR (SEROquel XR) 300 mg sr tablet, Take 1 Tab by mouth nightly., Disp: 90 Tab, Rfl: 1    glimepiride (AMARYL) 4 mg tablet, Take 1 Tab by mouth every morning., Disp: 60 Tab, Rfl: 5    ferrous sulfate (IRON) 325 mg (65 mg iron) EC tablet, take 1 tablet by mouth once daily, Disp: 90 Tab, Rfl: 1    DULoxetine (CYMBALTA) 60 mg capsule, Take 2 Caps by mouth daily. Indications: neuropathic pain, Disp: 60 Cap, Rfl: 5    glucose blood VI test strips (ONETOUCH VERIO) strip, Use to check blood sugar daily, Disp: 100 Strip, Rfl: 5    Blood-Glucose Meter (ONETOUCH VERIO IQ METER) misc, Use to check blood sugar daily, Disp: 1 Each, Rfl: 0    lisinopril (PRINIVIL, ZESTRIL) 10 mg tablet, Take 1 Tab by mouth daily. , Disp: 30 Tab, Rfl: 5    dilTIAZem CD (CARDIZEM CD) 120 mg ER capsule, Take 1 Cap by mouth daily. Indications: Ventricular Rate Control in Atrial Fibrillation, Disp: 90 Cap, Rfl: 3    magnesium oxide (MAG-OX) 400 mg tablet, Take 1 Tab by mouth daily. Indications: low amount of magnesium in the blood, Disp: 90 Tab, Rfl: 2    glucose blood VI test strips (ASCENSIA AUTODISC VI, ONE TOUCH ULTRA TEST VI) strip, Use as directed to test blood sugar twice a day, Disp: 100 Strip, Rfl: 6    aspirin delayed-release 81 mg tablet, Take  by mouth daily. , Disp: , Rfl:     IBUPROFEN PO, Take 800 mg by mouth three (3) times daily as needed for Pain., Disp: , Rfl:     SUPHEDRINE 30 mg tablet, take 1 tablet by mouth every 4 hours if needed for congestion, Disp: 24 Tab, Rfl: 0    Physical Exam:      General: obese habitus, NAD, conversant  Eyes: sclera clear bilaterally, no discharge noted, eyelids normal in appearance, EOMI  HENT: NCAT  Neck: no masses visualized, trachea appears to be midline  Lungs: normal respiratory effort and rate, No visualized signs of difficulty breathing or respiratory distress  MS: normal AROM of neck noted  Skin: no significant exanthematous lesions or discoloration noted on neck/facial skin    Psych: alert and oriented to person, place and situation, normal affect  Neuro: speech normal, moving all upper extremities, no gaze palsy, no facial asymmetry (Cranial nerve 7 motor function) (limited exam due to video visit)    Results for Phylliss Dance (MRN [id number]):   Ref. Range 3/3/2020 11:15   Sodium Latest Ref Range: 135 - 146 mmol/L 137   Potassium Latest Ref Range: 3.5 - 5.3 mmol/L 3.8   Chloride Latest Ref Range: 98 - 110 mmol/L 100   CO2 Latest Ref Range: 20 - 32 mmol/L 27   Glucose Latest Ref Range: 65 - 99 mg/dL 397 (H)   BUN Latest Ref Range: 7 - 25 mg/dL 11   Creatinine Latest Ref Range: 0.70 - 1.33 mg/dL 0.79   BUN/Creatinine ratio Latest Ref Range: 6 - 22 (calc) NOT APPLICABLE   Calcium Latest Ref Range: 8.6 - 10.3 mg/dL 9.4   GFR est non-AA Latest Ref Range: > OR = 60 mL/min/1.73m2 99   GFR est AA Latest Ref Range: > OR = 60 mL/min/1.73m2 115   Bilirubin, total Latest Ref Range: 0.2 - 1.2 mg/dL 1.0   Protein, total Latest Ref Range: 6.1 - 8.1 g/dL 7.2   Albumin Latest Ref Range: 3.6 - 5.1 g/dL 3.6   Globulin Latest Ref Range: 1.9 - 3.7 g/dL (calc) 3.6   ALT Latest Ref Range: 9 - 46 U/L 59 (H)   AST Latest Ref Range: 10 - 35 U/L 57 (H)   Alk. phosphatase Latest Ref Range: 35 - 144 U/L 187 (H)   LD Latest Ref Range: 120 - 250 U/L 233       Assessment/Plan     DMII, inadequately controlled:  -Glimepiride dose increased to 8 mg  -5 mg of Farxiga added to current diabetic medication regimen  -Follow-up in 1 month      Riky Fox is a 62 y.o. male being evaluated by a Virtual Visit (video visit) encounter to address concerns as mentioned above. A caregiver was present when appropriate.  Due to this being a TeleHealth encounter (During [facility]- public health emergency), evaluation of the following organ systems was limited: Vitals/Constitutional/EENT/Resp/CV/GI//MS/Neuro/Skin/Heme-Lymph-Imm. Pursuant to the emergency declaration under the [address] [place] [address] and the [employer] and [employer] Act, this Virtual Visit was conducted with patient's (and/or legal guardian's) consent, to reduce the risk of exposure to COVID-19 and provide necessary medical care. Services were provided through a video synchronous discussion virtually to substitute for in-person encounter. --Alberto Mccurdy MD on 6/24/2020 at 12:58 PM    An electronic signature was used to authenticate this note.

## 2020-06-24 NOTE — PROGRESS NOTES
1. Have you been to the ER, urgent care clinic since your last visit? Hospitalized since your last visit? No    2. Have you seen or consulted any other health care providers outside of the [address] [place] [address] since your last visit? Include any pap smears or colon screening.  No

## 2020-07-14 DIAGNOSIS — E11.42 TYPE 2 DIABETES MELLITUS WITH DIABETIC POLYNEUROPATHY, WITHOUT LONG-TERM CURRENT USE OF INSULIN (HCC): ICD-10-CM

## 2020-07-15 RX ORDER — BLOOD SUGAR DIAGNOSTIC
STRIP MISCELLANEOUS
Qty: 100 STRIP | Refills: 5 | Status: SHIPPED | OUTPATIENT
Start: 2020-07-15 | End: 2021-02-20

## 2020-07-15 RX ORDER — BLOOD-GLUCOSE METER
EACH MISCELLANEOUS
Qty: 1 EACH | Refills: 0 | Status: SHIPPED | OUTPATIENT
Start: 2020-07-15 | End: 2021-02-20

## 2020-08-13 ENCOUNTER — HOSPITAL ENCOUNTER (EMERGENCY)
Age: 60
Discharge: HOME | End: 2020-08-13
Attending: EMERGENCY MEDICINE
Payer: MEDICAID

## 2020-08-13 ENCOUNTER — APPOINTMENT (OUTPATIENT)
Dept: GENERAL RADIOLOGY | Age: 60
End: 2020-08-13
Attending: EMERGENCY MEDICINE
Payer: MEDICAID

## 2020-08-13 VITALS
TEMPERATURE: 98.6 F | RESPIRATION RATE: 19 BRPM | SYSTOLIC BLOOD PRESSURE: 108 MMHG | HEART RATE: 94 BPM | OXYGEN SATURATION: 93 % | DIASTOLIC BLOOD PRESSURE: 76 MMHG

## 2020-08-13 DIAGNOSIS — S22.42XA CLOSED FRACTURE OF MULTIPLE RIBS OF LEFT SIDE, INITIAL ENCOUNTER: Primary | ICD-10-CM

## 2020-08-13 PROCEDURE — 71101 X-RAY EXAM UNILAT RIBS/CHEST: CPT

## 2020-08-13 PROCEDURE — 99281 EMR DPT VST MAYX REQ PHY/QHP: CPT

## 2020-08-13 RX ORDER — OXYCODONE AND ACETAMINOPHEN 5; 325 MG/1; MG/1
1 TABLET ORAL
Status: DISCONTINUED | OUTPATIENT
Start: 2020-08-13 | End: 2020-08-14 | Stop reason: HOSPADM

## 2020-08-13 RX ORDER — OXYCODONE AND ACETAMINOPHEN 5; 325 MG/1; MG/1
1 TABLET ORAL
Qty: 12 TAB | Refills: 0 | Status: SHIPPED | OUTPATIENT
Start: 2020-08-13 | End: 2020-08-16

## 2020-08-13 NOTE — ED PROVIDER NOTES
[facility]  [facility] EMERGENCY DEPT      7:13 PM    Date: 8/13/2020  Patient Name: Karyna Harvey    History of Presenting Illness     Chief Complaint   Patient presents with    Rib Pain       62 y.o. male with noted past medical history who presents to the emergency department with left chest/rib pain status post fall. Patient states he was in his usual state of health until last night when he was walking from 1 room to another in a hotel and states there was a drop of about an inch and subsequently fell forward landing with his left chest against a door jam.  Since then he has had significant left anterolateral inferior chest wall pain and he states he can feel his ribs moving and he believes that they are broke. He denies any shortness of breath. He has no other complaints. No other injuries from the fall. The patient denies recent travel outside [facility] and denies recent travel to areas large social gatherings. The patient denies any known history of Covid 19 exposure. Patient denies any other associated signs or symptoms. Patient denies any other complaints. Nursing notes regarding the HPI and triage nursing notes were reviewed. Prior medical records were reviewed. Current Facility-Administered Medications   Medication Dose Route Frequency Provider Last Rate Last Dose    oxyCODONE-acetaminophen (PERCOCET) 5-325 mg per tablet 1 Tab  1 Tab Oral NOW Sidney Cruz MD         Current Outpatient Medications   Medication Sig Dispense Refill    Blood-Glucose Meter (OneTouch Verio IQ Meter) misc Use to check blood sugar daily 1 Each 0    glucose blood VI test strips (OneTouch Verio test strips) strip Use to check blood sugar daily 100 Strip 5    glimepiride (AMARYL) 4 mg tablet Take 2 Tabs by mouth every morning. 60 Tab 3    dapagliflozin (Farxiga) 5 mg tab tablet Take 1 Tab by mouth daily.  30 Tab 3    glucose blood VI test strips (ASCENSIA AUTODISC VI, ONE TOUCH ULTRA TEST VI) strip Use as directed to test blood sugar twice a day 100 Strip 6    metFORMIN (GLUCOPHAGE) 1,000 mg tablet take 1 tablet by mouth twice a day with meals 180 Tab 1    QUEtiapine SR (SEROquel XR) 300 mg sr tablet Take 1 Tab by mouth nightly. 90 Tab 1    ferrous sulfate (IRON) 325 mg (65 mg iron) EC tablet take 1 tablet by mouth once daily 90 Tab 1    SUPHEDRINE 30 mg tablet take 1 tablet by mouth every 4 hours if needed for congestion 24 Tab 0    DULoxetine (CYMBALTA) 60 mg capsule Take 2 Caps by mouth daily. Indications: neuropathic pain 60 Cap 5    lisinopril (PRINIVIL, ZESTRIL) 10 mg tablet Take 1 Tab by mouth daily. 30 Tab 5    dilTIAZem CD (CARDIZEM CD) 120 mg ER capsule Take 1 Cap by mouth daily. Indications: Ventricular Rate Control in Atrial Fibrillation 90 Cap 3    magnesium oxide (MAG-OX) 400 mg tablet Take 1 Tab by mouth daily. Indications: low amount of magnesium in the blood 90 Tab 2    aspirin delayed-release 81 mg tablet Take  by mouth daily.  IBUPROFEN PO Take 800 mg by mouth three (3) times daily as needed for Pain.          Past History     Past Medical History:  Past Medical History:   Diagnosis Date    Alcohol abuse 8/22/2013    CAD (coronary artery disease)     \"cardiac stent placed 1 year ago\"    Depression     Diastolic dysfunction 6/3/5381    DMII [facility]diabetes mellitus, type 2) ([facility])     Hypertension     Mild concentric left ventricular hypertrophy (LVH) 7/9/2018    Mitral regurgitation 7/9/2018    Substance abuse ([address]     Suicidal thoughts     Tricuspid regurgitation 1/5/7076    Umbilical hernia        Past Surgical History:  Past Surgical History:   Procedure Laterality Date    HX BACK SURGERY  2014    HX CORONARY STENT PLACEMENT  2012    No stent placed       Family History:  Family History   Problem Relation Age of Onset    Suicide Father     Diabetes Brother     Psychiatric Disorder Other        Social History:  Social History     Tobacco Use    Smoking status: Never Smoker    Smokeless tobacco: Never Used   Substance Use Topics    Alcohol use: Yes     Alcohol/week: 63.0 standard drinks     Types: 63 Cans of beer per week    Drug use: No     Types: Benzodiazepines       Allergies: Allergies   Allergen Reactions    Aleve [Naproxen Sodium] Itching and Hives    Naproxen Hives       Patient's primary care provider (as noted in EPIC):  Teddy Chiu MD    Review of Systems    Visit Vitals  /76 (BP 1 Location: Left arm, BP Patient Position: Sitting)   Pulse 94   Temp 98.6 °F (37 °C)   Resp 19   SpO2 93%       PHYSICAL EXAM:    CONSTITUTIONAL: Alert, in no apparent distress; well-developed; well-nourished. HEAD:  Normocephalic, atraumatic. No Battles sign. No Raccoons eyes. EYES:  EOM's intact. Normal conjunctiva. Anicteric sclera. ENTM: Nose: no rhinorrhea; Oropharynx:  mucous membranes moist    Neck:  No JVD. No cervical vertebral bony point tenderness or step-off. RESP: Chest clear, equal breath sounds. CARDIOVASCULAR:  Regular rate and rhythm. No murmurs, rubs, or gallops. Chest: Significant left anterolateral inferior chest wall tenderness to palpation but no obvious crepitus. GI: Normal bowel sounds, abdomen soft and non-tender. No masses or organomegaly. : No costo-vertebral angle tenderness. BACK:  No TLS vertebral bony point tenderness or step-off. UPPER EXT:  Normal inspection  LOWER EXT: No edema, no calf tenderness. Distal pulses intact. NEURO: Grossly normal motor and sensation. SKIN: No rashes; Normal for age and stage. PSYCH:  Alert and oriented, normal affect. Diagnostic Study Results     Abnormal lab results from this emergency department encounter:  Labs Reviewed - No data to display    Lab values for this patient within approximately the last 12 hours:  No results found for this or any previous visit (from the past 12 hour(s)).     Radiologist and cardiologist interpretations if available at time of this note:  No results found. Medication(s) ordered for patient during this emergency visit encounter:  Medications   oxyCODONE-acetaminophen (PERCOCET) 5-325 mg per tablet 1 Tab (has no administration in time range)       Medical Decision Making     I am the first provider for this patient. I reviewed the vital signs, available nursing notes, past medical history, past surgical history, family history and social history. Vital Signs:  Reviewed the patient's vital signs. DIFFERENTIAL DIAGNOSES/ MEDICAL DECISION MAKING:  Contusion, sprain, dislocation, fracture, ligamentous tear/ disruption or a combination of the above. Left rib series x-ray with chest x-ray, portable: At least 2 noted rib fractures on the left. No obvious pneumothorax. ED COURSE:        Based on the patient's history of presenting illness, physical examination, laboratory, radiographic, and/or tests results, and response to medical interventions, I believe the patient has a fracture of left ribs x2 as seen on x-rays in the emergency department. Diagnoses:  1. Fracture, left ribs x2    SPECIFIC PATIENT INSTRUCTIONS FROM THE PHYSICIAN WHO TREATED YOU IN THE ER TODAY:  1. Follow-up with your primary care doctor in the next week for reevaluation. 2. Take the pain medication as directed. 3.  It is important with rib fractures to continue taking deep breaths otherwise the lungs get lazy. All this may be painful is important to keep taking deep breaths and breathing normally and not splinting or breaths and breathing shallow. Patient is improved, resting quietly and comfortably. The patient will be discharged home. The patient was reassured that these symptoms do not appear to represent a serious or life threatening condition at this time. Warning signs of worsening condition were discussed and understood by the patient.      Based on patient's age, coexisting illness, exam, and the results of this ED evaluation, the decision to treat as an outpatient was made. Based on the information available at time of discharge, acute pathology requiring immediate intervention was deemed relative unlikely. While it is impossible to completely exclude the possibility of underlying serious disease or worsening of condition, I feel the relative likelihood is extremely low. I discussed this uncertainty with the patient, who understood ED evaluation and treatment and felt comfortable with the outpatient treatment plan. All questions regarding care, test results, and follow up were answered. The patient is stable and appropriate to discharge. They understand that they should return to the emergency department for any new or worsening symptoms. I stressed the importance of follow up for repeat assessment and possibly further evaluation/treatment. Dictation disclaimer:  Please note that this dictation was completed with [employer], the computer voice recognition software. Quite often unanticipated grammatical, syntax, homophones, and other interpretive errors are inadvertently transcribed by the computer software. Please disregard these errors. Please excuse any errors that have escaped final proofreading. Coding Diagnoses     Clinical Impression:   1. Closed fracture of multiple ribs of left side, initial encounter        Disposition     Disposition: Discharge. DAIANA Donahue Board Certified Emergency Physician    Provider Attestation:  If a scribe was utilized in generation of this patient record, I personally performed the services described in the documentation, reviewed the documentation, as recorded by the scribe in my presence, and it accurately records the patient's history of presenting illness, review of systems, patient physical examination, and procedures performed by me as the attending physician. DAIANA Donahue Board Certified Emergency Physician  8/13/2020.  7:16 PM

## 2020-08-13 NOTE — DISCHARGE INSTRUCTIONS
SPECIFIC PATIENT INSTRUCTIONS FROM THE PHYSICIAN WHO TREATED YOU IN THE ER TODAY:  1. Follow-up with your primary care doctor in the next week for reevaluation. 2. Take the pain medication as directed. 3.  It is important with rib fractures to continue taking deep breaths otherwise the lungs get lazy. All this may be painful is important to keep taking deep breaths and breathing normally and not splinting or breaths and breathing shallow. Patient Education        Broken Rib: Care Instructions  Your Care Instructions     A broken rib is a crack or break in one of the bones of the rib cage. Breathing can be very painful because the muscles used for breathing pull on the rib. In most cases, a broken rib will heal on its own. You can take pain medicine while the rib mends. Pain relief allows you to take deep breaths. In the past, doctors recommended taping or wrapping broken ribs. This is no longer done because taping makes it hard for you to take deep breaths. Taking deep breaths may help prevent pneumonia or a partial collapse of a lung. Your rib will heal in about 6 weeks. You heal best when you take good care of yourself. Eat a variety of healthy foods, and don't smoke. Follow-up care is a key part of your treatment and safety. Be sure to make and go to all appointments, and call your doctor if you are having problems. It's also a good idea to know your test results and keep a list of the medicines you take. How can you care for yourself at home? · Be safe with medicines. Read and follow all instructions on the label. ? If the doctor gave you a prescription medicine for pain, take it as prescribed. ? If you are not taking a prescription pain medicine, ask your doctor if you can take an over-the-counter medicine. · Even if it hurts, try to cough or take the deepest breath you can at least once every hour. This will get air deeply into your lungs.  This may reduce your chance of getting pneumonia or a partial collapse of a lung. Hold a pillow against your chest to make this less painful. · Put ice or a cold pack on the area for 10 to 20 minutes at a time. Put a thin cloth between the ice and your skin. When should you call for help? [id number] anytime you think you may need emergency care. For example, call if:  · You have severe trouble breathing. Call your doctor now or seek immediate medical care if:  · You have some trouble breathing. · You have a fever. · You have a new or worse cough. Watch closely for changes in your health, and be sure to contact your doctor if:  · You have pain even after taking your medicine. · You do not get better as expected. Where can you learn more? Go to http://jace-katherine.info/  Enter M135 in the search box to learn more about \"Broken Rib: Care Instructions. \"  Current as of: March 2, 2020               Content Version: 12.5  © 3016-8373 [employer]. Care instructions adapted under license by [employer] (which disclaims liability or warranty for this information). If you have questions about a medical condition or this instruction, always ask your healthcare professional. [address] any warranty or liability for your use of this information. Perzo Activation    Thank you for requesting access to Per[employer]. Please follow the instructions below to securely access and download your online medical record. Perzo allows you to send messages to your doctor, view your test results, renew your prescriptions, schedule appointments, and more. How Do I Sign Up? In your internet browser, go to [email or web address]. Click on the First Time User? Click Here link in the Sign In box. You will see the New Member Sign Up page. Enter your Perzo Access Code exactly as it appears below. You will not need to use this code after you´ve completed the sign-up process.  If you do not sign up before the expiration date, you must request a new code. CareOne Access Code: [email or web address]  Expires: 3/28/2019  2:27 PM (This is the date your CareOne access code will )    Enter the last four digits of your Social Security Number (xxxx) and Date of Birth (mm/dd/yyyy) as indicated and click Submit. You will be taken to the next sign-up page. Create a CareOne ID. This will be your CareOne login ID and cannot be changed, so think of one that is secure and easy to remember. Create a CareOne password. You can change your password at any time. Enter your Password Reset Question and Answer. This can be used at a later time if you forget your password. Enter your e-mail address. You will receive e-mail notification when new information is available in [address] Click Sign Up. You can now view and download portions of your medical record. Click the [employer] link to download a portable copy of your medical information. Additional Information    If you have questions, please visit the Frequently Asked Questions section of the CareOne website at [email or web address] Remember, CareOne is NOT to be used for urgent needs. For medical emergencies, dial 911.

## 2020-08-14 ENCOUNTER — PATIENT OUTREACH (OUTPATIENT)
Dept: CASE MANAGEMENT | Age: 60
End: 2020-08-14

## 2020-08-14 NOTE — PROGRESS NOTES
Date/Time:  8/14/2020 9:24 AM  Attempted to reach patient by telephone. Left HIPPA compliant message requesting a return call. Will attempt to reach patient again.

## 2020-08-25 ENCOUNTER — VIRTUAL VISIT (OUTPATIENT)
Dept: FAMILY MEDICINE CLINIC | Age: 60
End: 2020-08-25

## 2020-09-14 ENCOUNTER — OFFICE VISIT (OUTPATIENT)
Dept: FAMILY MEDICINE CLINIC | Age: 60
End: 2020-09-14

## 2020-09-14 VITALS
WEIGHT: 212 LBS | HEIGHT: 71 IN | TEMPERATURE: 97.1 F | BODY MASS INDEX: 29.68 KG/M2 | RESPIRATION RATE: 16 BRPM | SYSTOLIC BLOOD PRESSURE: 120 MMHG | HEART RATE: 89 BPM | DIASTOLIC BLOOD PRESSURE: 85 MMHG | OXYGEN SATURATION: 98 %

## 2020-09-14 DIAGNOSIS — J96.01 ACUTE RESPIRATORY FAILURE WITH HYPOXIA AND HYPERCAPNIA (HCC): ICD-10-CM

## 2020-09-14 DIAGNOSIS — F10.29 ALCOHOL DEPENDENCE WITH UNSPECIFIED ALCOHOL-INDUCED DISORDER (HCC): ICD-10-CM

## 2020-09-14 DIAGNOSIS — R52 PAIN: ICD-10-CM

## 2020-09-14 DIAGNOSIS — I10 ESSENTIAL HYPERTENSION WITH GOAL BLOOD PRESSURE LESS THAN 130/80: ICD-10-CM

## 2020-09-14 DIAGNOSIS — F10.931 DELIRIUM TREMENS (HCC): ICD-10-CM

## 2020-09-14 DIAGNOSIS — J96.02 ACUTE RESPIRATORY FAILURE WITH HYPOXIA AND HYPERCAPNIA (HCC): ICD-10-CM

## 2020-09-14 DIAGNOSIS — I48.91 ATRIAL FIBRILLATION, UNSPECIFIED TYPE (HCC): Primary | ICD-10-CM

## 2020-09-14 DIAGNOSIS — J15.211 PNEUMONIA DUE TO METHICILLIN SUSCEPTIBLE STAPHYLOCOCCUS AUREUS, UNSPECIFIED LATERALITY, UNSPECIFIED PART OF LUNG: ICD-10-CM

## 2020-09-14 DIAGNOSIS — F10.939 ALCOHOL WITHDRAWAL SYNDROME WITH COMPLICATION (HCC): ICD-10-CM

## 2020-09-14 RX ORDER — LISINOPRIL AND HYDROCHLOROTHIAZIDE 12.5; 2 MG/1; MG/1
TABLET ORAL
COMMUNITY
End: 2020-09-14 | Stop reason: DRUGHIGH

## 2020-09-14 RX ORDER — LANOLIN ALCOHOL/MO/W.PET/CERES
CREAM (GRAM) TOPICAL
COMMUNITY
Start: 2020-09-11

## 2020-09-14 RX ORDER — FOLIC ACID 1 MG/1
1 TABLET ORAL DAILY
COMMUNITY
Start: 2020-09-11

## 2020-09-14 RX ORDER — DICLOFENAC SODIUM 75 MG/1
75 TABLET, DELAYED RELEASE ORAL 2 TIMES DAILY
Qty: 60 TAB | Refills: 1 | Status: SHIPPED | OUTPATIENT
Start: 2020-09-14 | End: 2020-11-24

## 2020-09-14 RX ORDER — OMEPRAZOLE 40 MG/1
CAPSULE, DELAYED RELEASE ORAL
COMMUNITY
Start: 2020-09-11

## 2020-09-14 RX ORDER — LISINOPRIL AND HYDROCHLOROTHIAZIDE 20; 25 MG/1; MG/1
2 TABLET ORAL DAILY
Qty: 60 TAB | Refills: 2 | Status: SHIPPED | OUTPATIENT
Start: 2020-09-14 | End: 2021-01-05 | Stop reason: SDUPTHER

## 2020-09-14 RX ORDER — ESCITALOPRAM OXALATE 5 MG/1
5 TABLET ORAL DAILY
COMMUNITY
Start: 2020-09-11 | End: 2020-09-14 | Stop reason: ALTCHOICE

## 2020-09-14 RX ORDER — AMIODARONE HYDROCHLORIDE 100 MG/1
TABLET ORAL
COMMUNITY
Start: 2020-09-12 | End: 2020-11-24 | Stop reason: SDUPTHER

## 2020-09-14 NOTE — PROGRESS NOTES
1. Have you been to the ER, urgent care clinic since your last visit? Hospitalized since your last visit? Yes When: 8-15-20 to 9-10-20 Marley Grullon for Irregular heart beat and weakness DX. Atrial Fibrillation    2. Have you seen or consulted any other health care providers outside of the [address] [place] [address] since your last visit? Include any pap smears or colon screening.  No

## 2020-09-14 NOTE — PROGRESS NOTES
[facility]    CC: JEANCARLOS for PABLO Fib    HPI:       Discharge summary was not available for review during visit, but the available hospital notes in [facility] were reviewed during the visit.       FATOUMATAMoira Fib:  -Admitted to [facility] on 8/15/2020  -Patient reports that he developed RVR during recent hospitalization due to not being on his diltiazem regimen  -Was discharged on 9/9/2020 on new amiodarone and Eliquis regimen  -Reports that he has been taking the medications as prescribed  -Denies any side effects or issues with the medications  -Reports no issues with palpitations/tachycardia  -Has not been following up with his prior cardiologist      Severe Alcohol Withdrawal  -Occurred during hospitalization  -ED reported that he had been drinking less the day prior due to his oxycodone use (Had been sleeping a lot)  -Had not drank anything at all the day that he was being evaluated in ED  -Was given clonidine for issue during his hospitalization  -Was discharged on multivitamin, thiamine, and folic acid regimen  -Has been taking supplements as prescribed  -Patient reports that he has quit drinking      Respiratory Failure:  -Was hypoxemic and hypercapnic  -Occurred during hospitalization  -Thought to be 2/2 DTs and/or pneumonia  -Bronchial culture from 8/17/2020 was positive for moderated Staphylococcus aureus and few Haemophilus parainfluenza  -Completed clindamycin antibiotic regimen during his hospitalization  -Denies any respiratory issue currently      ROS: Positive items marked in RED  CON: fever, chills  Cardiovascular: palpitations, CP  Resp: SOB, cough  GI: nausea, vomiting, diarrhea  : dysuria, hematuria    Past Medical History:   Diagnosis Date    Alcohol abuse 8/22/2013    CAD (coronary artery disease)     \"cardiac stent placed 1 year ago\"    Depression     Diastolic dysfunction 4/1/1435    DM[facility] ([facility], type 2) [facility]     Hypertension     Mild concentric left ventricular hypertrophy (LVH) 7/9/2018    Mitral regurgitation 7/9/2018    [facility]     Suicidal thoughts     Tricuspid regurgitation 3/5/0143    Umbilical hernia        Past Surgical History:   Procedure Laterality Date    HX BACK SURGERY  2014    HX CORONARY STENT PLACEMENT  2012    No stent placed       Family History   Problem Relation Age of Onset    Suicide Father     Diabetes Brother     Psychiatric Disorder Other        Social History     Tobacco Use    Smoking status: Never Smoker    Smokeless tobacco: Never Used   Substance Use Topics    Alcohol use: Yes     Alcohol/week: 63.0 standard drinks     Types: 63 Cans of beer per week    Drug use: No     Types: Benzodiazepines       Allergies   Allergen Reactions    Aleve [Naproxen Sodium] Itching and Hives    Naproxen Hives         Current Outpatient Medications:     apixaban (ELIQUIS) 5 mg tablet, Take 5 mg by mouth two (2) times a day., Disp: , Rfl:     folic acid (FOLVITE) 1 mg tablet, Take 1 mg by mouth daily. , Disp: , Rfl:     thiamine HCL (B-1) 100 mg tablet, take 1 tablet by mouth once daily, Disp: , Rfl:     omeprazole (PRILOSEC) 40 mg capsule, take 1 capsule by mouth every morning before breakfast, Disp: , Rfl:     Pacerone 100 mg tablet, take 1 tablet by mouth once daily, Disp: , Rfl:     lisinopril-hydroCHLOROthiazide (PRINZIDE, ZESTORETIC) 20-25 mg per tablet, Take 2 Tabs by mouth daily. , Disp: 60 Tab, Rfl: 2    diclofenac EC (VOLTAREN) 75 mg EC tablet, Take 1 Tab by mouth two (2) times a day., Disp: 60 Tab, Rfl: 1    Blood-Glucose Meter (OneTouch Verio IQ Meter) misc, Use to check blood sugar daily, Disp: 1 Each, Rfl: 0    glucose blood VI test strips (OneTouch Verio test strips) strip, Use to check blood sugar daily, Disp: 100 Strip, Rfl: 5    glimepiride (AMARYL) 4 mg tablet, Take 2 Tabs by mouth every morning., Disp: 60 Tab, Rfl: 3    glucose blood VI test strips (ASCENSIA AUTODISC VI, ONE TOUCH ULTRA TEST VI) strip, Use as directed to test blood sugar twice a day, Disp: 100 Strip, Rfl: 6    metFORMIN (GLUCOPHAGE) 1,000 mg tablet, take 1 tablet by mouth twice a day with meals, Disp: 180 Tab, Rfl: 1    QUEtiapine SR (SEROquel XR) 300 mg sr tablet, Take 1 Tab by mouth nightly., Disp: 90 Tab, Rfl: 1    DULoxetine (CYMBALTA) 60 mg capsule, Take 2 Caps by mouth daily. Indications: neuropathic pain, Disp: 60 Cap, Rfl: 5    magnesium oxide (MAG-OX) 400 mg tablet, Take 1 Tab by mouth daily. Indications: low amount of magnesium in the blood, Disp: 90 Tab, Rfl: 2    aspirin delayed-release 81 mg tablet, Take  by mouth daily. , Disp: , Rfl:     ferrous sulfate (IRON) 325 mg (65 mg iron) EC tablet, take 1 tablet by mouth once daily, Disp: 90 Tab, Rfl: 1    Physical Exam:      /85   Pulse 89   Temp 97.1 °F (36.2 °C) (Temporal)   Resp 16   Ht 5' 11\" (1.803 m)   Wt 212 lb (96.2 kg)   SpO2 98%   BMI 29.57 kg/m²     General:  WD, WN, NAD, conversant  Eyes: sclera clear bilaterally, no discharge noted, eyelids normal in appearance  HENT: NCAT  Lungs: CTAB, normal respiratory effort and rate  CV: RRR, no MRGs  ABD: soft, non-tender, non-distended, normal bowel sounds  Ext: no peripheral edema or digital cyanosis noted  Skin: normal temperature, turgor, color, and texture  Psych: alert and oriented to person, place and situation, normal affect  Neuro: speech normal, moving all extremities, gait normal      Assessment/Plan     A. Fib, well controlled:  -Will continue current amiodarone and Eliquis regimen  -Referred to cardiology for EOC  -F/U in 1 month      HTN, inadequately controlled:  -BP not at goal of <130/80  -Adjusted BP regimen to 40 mg of lisinopril with 50 mg of HCTZ daily  -F/U in 1 month      Severe Alcohol Withdrawal, resolved:  -2/2 his previous alcohol dependence  -Will continue current multivitamin, thiamine, and folic acid regimen  -Encouraged to remain abstinent  -F/U in 1 month      Acute Respiratory Failure with Hypoxia and Hypercapnia, resolved:  -Likely developed complication due to his delirium tremens and MSSA pneumonia  -Currently no indication for further intervention  -F/U in 1 month      Vinicio Desai MD  9/14/2020, 10:04 AM

## 2020-09-18 RX ORDER — FERROUS SULFATE 325(65) MG
TABLET, DELAYED RELEASE (ENTERIC COATED) ORAL
Qty: 90 TAB | Refills: 1 | Status: SHIPPED | OUTPATIENT
Start: 2020-09-18 | End: 2021-02-20

## 2020-10-13 ENCOUNTER — VIRTUAL VISIT (OUTPATIENT)
Dept: FAMILY MEDICINE CLINIC | Age: 60
End: 2020-10-13

## 2020-10-13 PROBLEM — E11.42 TYPE 2 DIABETES MELLITUS WITH DIABETIC POLYNEUROPATHY, WITHOUT LONG-TERM CURRENT USE OF INSULIN (HCC): Status: ACTIVE | Noted: 2018-04-17

## 2020-10-13 RX ORDER — LANOLIN ALCOHOL/MO/W.PET/CERES
400 CREAM (GRAM) TOPICAL DAILY
Qty: 90 TAB | Refills: 2 | Status: CANCELLED | OUTPATIENT
Start: 2020-10-13

## 2020-10-13 RX ORDER — DULOXETIN HYDROCHLORIDE 60 MG/1
120 CAPSULE, DELAYED RELEASE ORAL DAILY
Qty: 60 CAP | Refills: 5 | Status: CANCELLED | OUTPATIENT
Start: 2020-10-13

## 2020-10-13 RX ORDER — GLIMEPIRIDE 4 MG/1
8 TABLET ORAL
Qty: 60 TAB | Refills: 3 | Status: CANCELLED | OUTPATIENT
Start: 2020-10-13

## 2020-10-13 RX ORDER — QUETIAPINE 300 MG/1
300 TABLET, FILM COATED, EXTENDED RELEASE ORAL
Qty: 90 TAB | Refills: 1 | Status: CANCELLED | OUTPATIENT
Start: 2020-10-13

## 2020-10-13 RX ORDER — METFORMIN HYDROCHLORIDE 1000 MG/1
TABLET ORAL
Qty: 180 TAB | Refills: 1 | Status: CANCELLED | OUTPATIENT
Start: 2020-10-13

## 2020-10-13 NOTE — PROGRESS NOTES
Chief Complaint   Patient presents with    Follow Up Chronic Condition     HTN, AFib     1. Have you been to the ER, urgent care clinic since your last visit? Hospitalized since your last visit? No    2. Have you seen or consulted any other health care providers outside of the [address] [place] [address] since your last visit? Include any pap smears or colon screening.  No     Health Maintenance Due   Topic    MICROALBUMIN Q1     Lipid Screen     Foot Exam Q1     Flu Vaccine (1)

## 2020-11-13 DIAGNOSIS — F32.A DEPRESSION, UNSPECIFIED DEPRESSION TYPE: ICD-10-CM

## 2020-11-13 DIAGNOSIS — G47.00 INSOMNIA, UNSPECIFIED TYPE: ICD-10-CM

## 2020-11-17 ENCOUNTER — VIRTUAL VISIT (OUTPATIENT)
Dept: FAMILY MEDICINE CLINIC | Age: 60
End: 2020-11-17

## 2020-11-17 RX ORDER — QUETIAPINE 300 MG/1
600 TABLET, FILM COATED, EXTENDED RELEASE ORAL
Qty: 60 TAB | Refills: 1 | Status: SHIPPED | OUTPATIENT
Start: 2020-11-17

## 2020-11-17 NOTE — PROGRESS NOTES
1. Have you been to the ER, urgent care clinic since your last visit? Hospitalized since your last visit? No    2. Have you seen or consulted any other health care providers outside of the [address] [place] [address] since your last visit? Include any pap smears or colon screening. No       Per Dr. America Lee he can not properly evaluate abdominal pain over the phone. Next Appointment is 11-24-20 and patient will wait for that appointment unless there is a cancellation. This encounter was created in error - please disregard.

## 2020-11-24 ENCOUNTER — OFFICE VISIT (OUTPATIENT)
Dept: FAMILY MEDICINE CLINIC | Age: 60
End: 2020-11-24
Payer: MEDICAID

## 2020-11-24 VITALS
RESPIRATION RATE: 18 BRPM | BODY MASS INDEX: 31.33 KG/M2 | WEIGHT: 223.8 LBS | SYSTOLIC BLOOD PRESSURE: 147 MMHG | TEMPERATURE: 98.4 F | OXYGEN SATURATION: 97 % | HEART RATE: 85 BPM | HEIGHT: 71 IN | DIASTOLIC BLOOD PRESSURE: 92 MMHG

## 2020-11-24 DIAGNOSIS — I48.91 ATRIAL FIBRILLATION, UNSPECIFIED TYPE (HCC): ICD-10-CM

## 2020-11-24 DIAGNOSIS — R14.0 ABDOMINAL DISTENSION: ICD-10-CM

## 2020-11-24 DIAGNOSIS — R60.0 BILATERAL LEG EDEMA: Primary | ICD-10-CM

## 2020-11-24 PROCEDURE — 99214 OFFICE O/P EST MOD 30 MIN: CPT | Performed by: FAMILY MEDICINE

## 2020-11-24 RX ORDER — LACTULOSE 10 G/15ML
30 SOLUTION ORAL; RECTAL EVERY 8 HOURS
COMMUNITY
Start: 2020-09-10 | End: 2020-12-14

## 2020-11-24 RX ORDER — LANOLIN ALCOHOL/MO/W.PET/CERES
400 CREAM (GRAM) TOPICAL DAILY
Qty: 90 TAB | Refills: 2 | Status: CANCELLED | OUTPATIENT
Start: 2020-11-24

## 2020-11-24 RX ORDER — METOPROLOL SUCCINATE 200 MG/1
1 TABLET, EXTENDED RELEASE ORAL
COMMUNITY
End: 2021-02-20

## 2020-11-24 RX ORDER — INSULIN GLARGINE 100 [IU]/ML
10 INJECTION, SOLUTION SUBCUTANEOUS
COMMUNITY
Start: 2020-09-10 | End: 2020-12-14

## 2020-11-24 RX ORDER — IBUPROFEN 200 MG
800 CAPSULE ORAL
COMMUNITY
End: 2020-11-24

## 2020-11-24 RX ORDER — ESCITALOPRAM OXALATE 5 MG/1
TABLET ORAL
COMMUNITY
Start: 2020-11-04 | End: 2021-02-20

## 2020-11-24 RX ORDER — FUROSEMIDE 20 MG/1
20 TABLET ORAL DAILY
Qty: 7 TAB | Refills: 1 | Status: SHIPPED | OUTPATIENT
Start: 2020-11-24 | End: 2021-02-20

## 2020-11-24 RX ORDER — AMIODARONE HYDROCHLORIDE 100 MG/1
100 TABLET ORAL DAILY
COMMUNITY
Start: 2020-09-11 | End: 2020-11-24 | Stop reason: SDUPTHER

## 2020-11-24 RX ORDER — AMIODARONE HYDROCHLORIDE 100 MG/1
100 TABLET ORAL DAILY
Qty: 30 TAB | Refills: 0 | Status: SHIPPED | OUTPATIENT
Start: 2020-11-24 | End: 2021-02-20

## 2020-11-24 NOTE — PROGRESS NOTES
1. Have you been to the ER, urgent care clinic since your last visit? Hospitalized since your last visit? No    2. Have you seen or consulted any other health care providers outside of the [address] [place] [address] since your last visit? Include any pap smears or colon screening.  No

## 2020-11-24 NOTE — PROGRESS NOTES
[facility]    CC: Edema    HPI:     Edema:  - Timing/onset: Issue for 3 weeks   - Duration: Constant  - Location: Legs and abdomen  - Quality: Pitting  - Context: Has Hx of A. Fib and alcohol abuse with associated hepatitis  - Progression/Course: Worsening  - Alleviating factors: Diuretic (12 lbs on first day and 8 lbs the next day)      ROS: Positive items marked in RED  CON: fever, chills  Cardiovascular: palpitations, CP  Resp: SOB, cough  GI: nausea, vomiting, diarrhea  : dysuria, hematuria      Past Medical History:   Diagnosis Date    Alcohol abuse 8/22/2013    CAD (coronary artery disease)     \"cardiac stent placed 1 year ago\"    Depression     Diastolic dysfunction 8/0/8184    [facility], [facility] 2[facility]     Hypertension     Mild concentric left ventricular hypertrophy (LVH) 7/9/2018    Mitral regurgitation 7/9/2018    Substance abuse [facility]     Suicidal thoughts     Tricuspid regurgitation 5/0/7509    Umbilical hernia        Past Surgical History:   Procedure Laterality Date    HX BACK SURGERY  2014    HX CORONARY STENT PLACEMENT  2012    No stent placed       Family History   Problem Relation Age of Onset    Suicide Father     Diabetes Brother     Psychiatric Disorder Other        Social History     Tobacco Use    Smoking status: Never Smoker    Smokeless tobacco: Never Used   Substance Use Topics    Alcohol use: Yes     Alcohol/week: 63.0 standard drinks     Types: 63 Cans of beer per week    Drug use: No     Types: Benzodiazepines       Allergies   Allergen Reactions    Aleve [Naproxen Sodium] Itching and Hives    Naproxen Hives         Current Outpatient Medications:     escitalopram oxalate (LEXAPRO) 5 mg tablet, take 1 tablet by mouth once daily, Disp: , Rfl:     ibuprofen 200 mg cap, Take 800 mg by mouth every eight (8) hours as needed. , Disp: , Rfl:     QUEtiapine SR (SEROquel XR) 300 mg sr tablet, Take 2 Tabs by mouth nightly., Disp: 60 Tab, Rfl: 1    ferrous sulfate (IRON) 325 mg (65 mg iron) EC tablet, take 1 tablet by mouth once daily, Disp: 90 Tab, Rfl: 1    folic acid (FOLVITE) 1 mg tablet, Take 1 mg by mouth daily. , Disp: , Rfl:     thiamine HCL (B-1) 100 mg tablet, take 1 tablet by mouth once daily, Disp: , Rfl:     omeprazole (PRILOSEC) 40 mg capsule, take 1 capsule by mouth every morning before breakfast, Disp: , Rfl:     lisinopril-hydroCHLOROthiazide (PRINZIDE, ZESTORETIC) 20-25 mg per tablet, Take 2 Tabs by mouth daily. , Disp: 60 Tab, Rfl: 2    Blood-Glucose Meter (OneTouch Verio IQ Meter) misc, Use to check blood sugar daily, Disp: 1 Each, Rfl: 0    glimepiride (AMARYL) 4 mg tablet, Take 2 Tabs by mouth every morning., Disp: 60 Tab, Rfl: 3    glucose blood VI test strips (ASCENSIA AUTODISC VI, ONE TOUCH ULTRA TEST VI) strip, Use as directed to test blood sugar twice a day, Disp: 100 Strip, Rfl: 6    metFORMIN (GLUCOPHAGE) 1,000 mg tablet, take 1 tablet by mouth twice a day with meals, Disp: 180 Tab, Rfl: 1    magnesium oxide (MAG-OX) 400 mg tablet, Take 1 Tab by mouth daily. Indications: low amount of magnesium in the blood, Disp: 90 Tab, Rfl: 2    amiodarone (PACERONE) 100 mg tablet, Take 100 mg by mouth daily. , Disp: , Rfl:     insulin glargine (LANTUS) 100 unit/mL injection, 10 Units by SubCUTAneous route., Disp: , Rfl:     lactulose (CHRONULAC) 10 gram/15 mL solution, 30 mL by Feeding Tube route every eight (8) hours. , Disp: , Rfl:     metoprolol succinate (Toprol XL) 200 mg XL tablet, Take 1 Tab by mouth., Disp: , Rfl:     apixaban (ELIQUIS) 5 mg tablet, Take 5 mg by mouth two (2) times a day., Disp: , Rfl:     diclofenac EC (VOLTAREN) 75 mg EC tablet, Take 1 Tab by mouth two (2) times a day., Disp: 60 Tab, Rfl: 1    glucose blood VI test strips (OneTouch Verio test strips) strip, Use to check blood sugar daily, Disp: 100 Strip, Rfl: 5    DULoxetine (CYMBALTA) 60 mg capsule, Take 2 Caps by mouth daily.  Indications: neuropathic pain, Disp: 60 Cap, Rfl: 5    aspirin delayed-release 81 mg tablet, Take  by mouth daily. , Disp: , Rfl:     Physical Exam:      BP (!) 147/92   Pulse 85   Temp 98.4 °F (36.9 °C) (Temporal)   Resp 18   Ht 5' 11\" (1.803 m)   Wt 223 lb 12.8 oz (101.5 kg)   SpO2 97%   BMI 31.21 kg/m²     General: obese habitus, NAD, conversant  Eyes: sclera clear bilaterally, no discharge noted, eyelids normal in appearance  HENT: NCAT  Lungs: CTAB, normal respiratory effort and rate  CV: RRR, no MRGs  ABD: no guarding, non-tender, distended, umbilical hernia noted, normal bowel sounds  Ext: bilateral pitting edema in lower extremities noted, no digital cyanosis noted  Skin: normal temperature, turgor, color, and texture  Psych: alert and oriented to person, place and situation, normal affect  Neuro: speech normal, moving all extremities, gait normal      Assessment/Plan       Bilateral Leg Edema and Abdominal Distension:  -Suspect possible hepatic etiology  -DDx includes cardiac and renal cause  -Abdominal U/S, echocardiogram, NT-proBNP, and CMP ordered  -Started on Lasix regimen  -Follow-up in 3 weeks        Kelly Frias MD  11/24/2020, 12:01 PM

## 2020-11-27 ENCOUNTER — HOSPITAL ENCOUNTER (OUTPATIENT)
Dept: ULTRASOUND IMAGING | Age: 60
Discharge: HOME | End: 2020-11-27
Attending: FAMILY MEDICINE
Payer: MEDICAID

## 2020-11-27 ENCOUNTER — HOSPITAL ENCOUNTER (OUTPATIENT)
Dept: NON INVASIVE DIAGNOSTICS | Age: 60
Discharge: HOME | End: 2020-11-27
Attending: FAMILY MEDICINE
Payer: MEDICAID

## 2020-11-27 VITALS — HEIGHT: 70 IN | BODY MASS INDEX: 31.92 KG/M2 | WEIGHT: 223 LBS

## 2020-11-27 DIAGNOSIS — R14.0 ABDOMINAL DISTENSION: ICD-10-CM

## 2020-11-27 DIAGNOSIS — I48.91 ATRIAL FIBRILLATION, UNSPECIFIED TYPE (HCC): ICD-10-CM

## 2020-11-27 DIAGNOSIS — R60.0 BILATERAL LEG EDEMA: ICD-10-CM

## 2020-11-27 LAB
ECHO AO ASC DIAM: 3.9 CM
ECHO EST RA PRESSURE: 3 MMHG
ECHO LA AREA 4C: 24.25 CM2
ECHO LA MAJOR AXIS: 4.77 CM
ECHO LA MINOR AXIS: 2.18 CM
ECHO LA VOL 2C: 105.74 ML (ref 18–58)
ECHO LA VOL 4C: 84.92 ML (ref 18–58)
ECHO LA VOL BP: 106.47 ML (ref 18–58)
ECHO LA VOL/BSA BIPLANE: 48.7 ML/M2 (ref 16–28)
ECHO LA VOLUME INDEX A2C: 48.37 ML/M2 (ref 16–28)
ECHO LA VOLUME INDEX A4C: 38.84 ML/M2 (ref 16–28)
ECHO LV INTERNAL DIMENSION DIASTOLIC: 4.39 CM (ref 4.2–5.9)
ECHO LV INTERNAL DIMENSION SYSTOLIC: 3.17 CM
ECHO LV IVSD: 1.69 CM (ref 0.6–1)
ECHO LV MASS 2D: 320.5 G (ref 88–224)
ECHO LV MASS INDEX 2D: 146.6 G/M2 (ref 49–115)
ECHO LV POSTERIOR WALL DIASTOLIC: 1.69 CM (ref 0.6–1)
ECHO MV A VELOCITY: 86.47 CM/S
ECHO MV E DECELERATION TIME (DT): 287.25 MS
ECHO MV E VELOCITY: 74.53 CM/S
ECHO MV E/A RATIO: 0.86
ECHO RIGHT VENTRICULAR SYSTOLIC PRESSURE (RVSP): 34.62 MMHG
ECHO TV REGURGITANT MAX VELOCITY: 281.16 CM/S
ECHO TV REGURGITANT PEAK GRADIENT: 31.62 MMHG

## 2020-11-27 PROCEDURE — 76700 US EXAM ABDOM COMPLETE: CPT

## 2020-11-27 PROCEDURE — 93306 TTE W/DOPPLER COMPLETE: CPT

## 2020-12-02 DIAGNOSIS — E11.42 TYPE 2 DIABETES MELLITUS WITH DIABETIC POLYNEUROPATHY, WITHOUT LONG-TERM CURRENT USE OF INSULIN (HCC): ICD-10-CM

## 2020-12-02 RX ORDER — METFORMIN HYDROCHLORIDE 1000 MG/1
TABLET ORAL
Qty: 180 TAB | Refills: 1 | Status: SHIPPED | OUTPATIENT
Start: 2020-12-02 | End: 2021-02-20

## 2020-12-16 ENCOUNTER — OFFICE VISIT (OUTPATIENT)
Dept: FAMILY MEDICINE CLINIC | Age: 60
End: 2020-12-16
Payer: MEDICAID

## 2020-12-16 VITALS
TEMPERATURE: 97.8 F | BODY MASS INDEX: 31.21 KG/M2 | DIASTOLIC BLOOD PRESSURE: 89 MMHG | OXYGEN SATURATION: 97 % | HEART RATE: 111 BPM | WEIGHT: 218 LBS | RESPIRATION RATE: 18 BRPM | SYSTOLIC BLOOD PRESSURE: 138 MMHG | HEIGHT: 70 IN

## 2020-12-16 DIAGNOSIS — K70.11 ASCITES DUE TO ALCOHOLIC HEPATITIS: Primary | ICD-10-CM

## 2020-12-16 DIAGNOSIS — K70.9 LIVER DISEASE DUE TO ALCOHOL (HCC): ICD-10-CM

## 2020-12-16 DIAGNOSIS — R60.0 BILATERAL LEG EDEMA: ICD-10-CM

## 2020-12-16 DIAGNOSIS — R14.0 ABDOMINAL DISTENSION: ICD-10-CM

## 2020-12-16 PROCEDURE — 99213 OFFICE O/P EST LOW 20 MIN: CPT | Performed by: FAMILY MEDICINE

## 2020-12-16 NOTE — PATIENT INSTRUCTIONS
Cirrhosis: Care Instructions Your Care Instructions Cirrhosis occurs when healthy tissue in your liver gets scarred. This keeps the liver from working well. It usually happens after a liver has been inflamed for years. Cirrhosis is most often caused by alcohol use disorder or hepatitis infection. But there are other causes too. These include medicines and too much fat in the liver. Conditions passed down in families and other disorders can also cause it. In some cases, no cause can be found. Treatment can't completely fix liver damage. But you may be able to slow or prevent more damage if you don't drink alcohol or use drugs that harm your liver. Follow-up care is a key part of your treatment and safety. Be sure to make and go to all appointments, and call your doctor if you are having problems. It's also a good idea to know your test results and keep a list of the medicines you take. How can you care for yourself at home? · Do not drink any alcohol. It can harm your liver. Talk to your doctor if you need help to stop drinking. · Be safe with medicines. Take your medicines exactly as prescribed. Call your doctor if you think you are having a problem with your medicine. · Talk to your doctor before you take any other medicines. These include over-the-counter medicines and herbal products. · Be careful taking acetaminophen (Tylenol), ibuprofen (Advil, Motrin), or naproxen (Aleve). These can sometimes cause more liver damage. Talk with your doctor if you're not sure which medicines are safe. · If your cirrhosis causes extra fluid to build up in your body, try not to eat a lot of salt. Use less salt when you cook and at the table. Don't eat fast foods or snack foods with a lot of salt. Extra fluid in your belly, legs, and chest can cause serious problems. · Work with your doctor or a dietitian to be sure you eat the right amount of carbohydrate, protein, fat, and sodium (salt). It's very important to choose the best foods for the health of your liver. · If your doctor recommends it, limit how much fluid you drink. · If your doctor recommends it, get more exercise. Walking is a good choice. Bit by bit, increase the amount you walk every day. Try for at least 30 minutes on most days of the week. You also may want to swim, bike, or do other activities. When should you call for help? Call 911 anytime you think you may need emergency care. For example, call if: 
  · You have trouble breathing.  
  · You vomit blood or what looks like coffee grounds. Call your doctor now or seek immediate medical care if: 
  · You feel very sleepy or confused.  
  · You have new belly pain, or your pain gets worse.  
  · You have a fever.  
  · There is a new or increasing yellow tint to your skin or the whites of your eyes.  
  · You have any abnormal bleeding, such as: 
? Nosebleeds. ? Vaginal bleeding that is different (heavier, more frequent, at a different time of the month) than what you are used to. 
? Bloody or black stools, or rectal bleeding. ? Bloody or pink urine. Watch closely for changes in your health, and be sure to contact your doctor if: 
  · You have any problems.  
  · Your belly is getting bigger.  
  · You are gaining weight. Where can you learn more? Go to http://www.gray.com/ Enter M412 in the search box to learn more about \"Cirrhosis: Care Instructions. \" Current as of: April 15, 2020               Content Version: 12.6 © 6560-3426 [employer]. Care instructions adapted under license by [employer] (which disclaims liability or warranty for this information). If you have questions about a medical condition or this instruction, always ask your healthcare professional. [address] any warranty or liability for your use of this information.

## 2020-12-16 NOTE — PROGRESS NOTES
1. Have you been to the ER, urgent care clinic since your last visit? Hospitalized since your last visit? No    2. Have you seen or consulted any other health care providers outside of the [address] [place] [address] since your last visit? Include any pap smears or colon screening.

## 2020-12-24 ENCOUNTER — OFFICE VISIT (OUTPATIENT)
Dept: CARDIOLOGY CLINIC | Age: 60
End: 2020-12-24
Payer: MEDICAID

## 2020-12-24 VITALS
BODY MASS INDEX: 30.29 KG/M2 | DIASTOLIC BLOOD PRESSURE: 64 MMHG | HEIGHT: 70 IN | SYSTOLIC BLOOD PRESSURE: 97 MMHG | RESPIRATION RATE: 16 BRPM | OXYGEN SATURATION: 97 % | HEART RATE: 103 BPM | TEMPERATURE: 98.3 F | WEIGHT: 211.6 LBS

## 2020-12-24 DIAGNOSIS — I10 ESSENTIAL HYPERTENSION: Primary | ICD-10-CM

## 2020-12-24 PROCEDURE — 93000 ELECTROCARDIOGRAM COMPLETE: CPT | Performed by: INTERNAL MEDICINE

## 2020-12-24 PROCEDURE — 99204 OFFICE O/P NEW MOD 45 MIN: CPT | Performed by: INTERNAL MEDICINE

## 2020-12-24 NOTE — PROGRESS NOTES
[facility]    Mr. Anusha An is 62 y.o. male with history of CAD, atrial fibrillation, alcohol abuse, diabetes and other multiple medical problem    Patient is here today for initial cardiac evaluation. He denies any prior history of myocardial infarction. He said he had blockage in the past however he does not have any coronary stent. He tells me that he was admitted to the hospital in [facility] 3 months ago. He was diagnosed with atrial fibrillation. He has been taking his medications regularly. He continues to work. He denies any symptoms concerning for angina or heart failure. He denies any significant palpitation, presyncope or syncope. Patient has a very long history of alcohol abuse however for last few months, he has reduced alcohol intake. He denies PND any significant lower extremity swelling. Past Medical History:   Diagnosis Date    A-fib [facility])     Alcohol abuse 8/22/2013    CAD (coronary artery disease)     Non-obstructive LAD by cath (2016)    Depression     [facility], [facility] 2) [facility]     Hypertension     Substance abuse ([facility]     Suicidal thoughts     Umbilical hernia        Review of Systems:  Cardiac symptoms as noted above in HPI. All others negative. Denies fatigue, malaise, skin rash, joint pain, blurring vision, photophobia, neck pain, hemoptysis, chronic cough, nausea, vomiting, hematuria, burning micturition, BRBPR, chronic headaches. Current Outpatient Medications   Medication Sig    metFORMIN (GLUCOPHAGE) 1,000 mg tablet take 1 tablet by mouth twice a day with meals    escitalopram oxalate (LEXAPRO) 5 mg tablet take 1 tablet by mouth once daily    metoprolol succinate (Toprol XL) 200 mg XL tablet Take 1 Tab by mouth.  apixaban (ELIQUIS) 5 mg tablet Take 1 Tab by mouth two (2) times a day.  amiodarone (PACERONE) 100 mg tablet Take 1 Tab by mouth daily.     furosemide (LASIX) 20 mg tablet Take 1 Tab by mouth daily.  QUEtiapine SR (SEROquel XR) 300 mg sr tablet Take 2 Tabs by mouth nightly.  ferrous sulfate (IRON) 325 mg (65 mg iron) EC tablet take 1 tablet by mouth once daily    folic acid (FOLVITE) 1 mg tablet Take 1 mg by mouth daily.  thiamine HCL (B-1) 100 mg tablet take 1 tablet by mouth once daily    omeprazole (PRILOSEC) 40 mg capsule take 1 capsule by mouth every morning before breakfast    lisinopril-hydroCHLOROthiazide (PRINZIDE, ZESTORETIC) 20-25 mg per tablet Take 2 Tabs by mouth daily.  Blood-Glucose Meter (OneTouch Verio IQ Meter) misc Use to check blood sugar daily    glucose blood VI test strips (OneTouch Verio test strips) strip Use to check blood sugar daily    glimepiride (AMARYL) 4 mg tablet Take 2 Tabs by mouth every morning.  glucose blood VI test strips (ASCENSIA AUTODISC VI, ONE TOUCH ULTRA TEST VI) strip Use as directed to test blood sugar twice a day    magnesium oxide (MAG-OX) 400 mg tablet Take 1 Tab by mouth daily. Indications: low amount of magnesium in the blood     No current facility-administered medications for this visit. Past Surgical History:   Procedure Laterality Date    HX BACK SURGERY  2014    HX CORONARY STENT PLACEMENT  2012    No stent placed       Allergies and Sensitivities:  Allergies   Allergen Reactions    Aleve [Naproxen Sodium] Itching and Hives    Naproxen Hives       Family History:  Family History   Problem Relation Age of Onset    Suicide Father     Diabetes Brother     Psychiatric Disorder Other        Social History:  Social History     Tobacco Use    Smoking status: Never Smoker    Smokeless tobacco: Never Used   Substance Use Topics    Alcohol use: Yes     Alcohol/week: 63.0 standard drinks     Types: 63 Cans of beer per week    Drug use: No     Types: Benzodiazepines     He  reports that he has never smoked.  He has never used smokeless tobacco.  He  reports current alcohol use of about 63.0 standard drinks of alcohol per week. Physical Exam:  BP Readings from Last 3 Encounters:   12/24/20 97/64   12/16/20 138/89   11/24/20 (!) 147/92         Pulse Readings from Last 3 Encounters:   12/24/20 (!) 103   12/16/20 (!) 111   11/24/20 85          Wt Readings from Last 3 Encounters:   12/24/20 211 lb 9.6 oz (96 kg)   12/16/20 218 lb (98.9 kg)   11/27/20 223 lb (101.2 kg)       Constitutional: Oriented to person, place, and time. HENT: Head: Normocephalic and atraumatic. Eyes: Conjunctivae and extraocular motions are normal.   Neck: No JVD present. Carotid bruit is not appreciated. Cardiovascular: Regular rhythm. No murmur, gallop or rubs appreciated  Lung: Breath sounds normal. No respiratory distress. No ronchi or rales appreciated  Abdominal: No tenderness. No rebound and no guarding. Musculoskeletal: There is trace lower extremity edema. No cynosis  Lymphadenopathy:  No cervical or supraclavicular adenopathy appriciated. Neurological: No gross motor deficit noted. Skin: No visible skin rash noted. No Ear discharge noted  Psychiatric: Normal mood and affect. Good distal pulse    LABS:   Lab Results   Component Value Date/Time    Sodium 137 03/03/2020 11:15 AM    Potassium 3.8 03/03/2020 11:15 AM    Chloride 100 03/03/2020 11:15 AM    CO2 27 03/03/2020 11:15 AM    Glucose 397 (H) 03/03/2020 11:15 AM    BUN 11 03/03/2020 11:15 AM    Creatinine 0.79 03/03/2020 11:15 AM     Lipids Latest Ref Rng & Units 7/10/2019   Chol, Total <200 MG/(H)   HDL 40 - 60 MG/DL 55   LDL 0 - 100 MG/. 8(H)   Trig <150 MG/(H)   Chol/HDL Ratio 0 - 5.0   3.7   Some recent data might be hidden     Lab Results   Component Value Date/Time    ALT (SGPT) 59 (H) 03/03/2020 11:15 AM     Lab Results   Component Value Date/Time    Hemoglobin A1c 5.2 01/15/2018 10:41 AM    Hemoglobin A1c (POC) 7.9 01/23/2020 12:58 PM     Lab Results   Component Value Date/Time    TSH 1.460 04/05/2019 03:23 PM       EKG    ECHO (08/20)   LEFT VENTRICULAR SYSTOLIC FUNCTION IS LOW NORMAL BY VISUAL ESTIMATE. EJECTION FRACTION OF   53%. CONTRAST DEFINITY WAS USED TO ENHANCE ENDOCARDIAL BORDERS. ABNORMAL SEPTAL MOTION. UNABLE TO ASSESS DIASTOLIC FUNCTION DUE TO RHYTHM. FOUR CHAMBER DILATATION. NORMAL RIGHT VENTRICULAR GLOBAL SYSTOLIC FUNCTION. AORTIC SCLEROSIS WITHOUT STENOSIS OR REGURGITATION. ESTIMATED PULMONARY ARTERY PRESSURE IS  35MMHG. IMPRESSION & PLAN:  Mr. Madeleine Rogers is 64-year-old male with multiple medical problem    Atrial fibrillation:  Diagnosed in August 2020 in the setting of alcohol withdrawal  On exam and by EKG she is in sinus rhythm. Currently on high dose of Toprol, Eliquis  No bleeding side effect. Have advised patient to quit alcohol use    Nonobstructive CAD:  Patient had cardiac catheterization in 2011 which showed moderate borderline mid LAD lesion. IVUS was performed and it was nonobstructive. Patient has remained on medical management. Currently on Eliquis, beta-blocker. He does not have any anginal symptoms at this time  Continue with risk factor modification    Hypertension:  BP 97/64. Repeat blood pressure was 107/60. Continue antihypertensives    Discussed with the patient the importance of quitting alcohol. He drinks probably 510 beers a week. Before he used to drink 1015 beers a day. He is trying to quit. This plan was discussed with patient who is in agreement. Thank you for allowing me to participate in patient care. Please feel free to call me if you have any question or concern. Paco Mandel MD  Please note: This document has been produced using voice recognition software. Unrecognized errors in transcription may be present.

## 2020-12-24 NOTE — PROGRESS NOTES
Walda Bowels presents today for   Chief Complaint   Patient presents with    New Patient     Jonathon       Walda Bowels preferred language for health care discussion is english/other. Personal Protective Equipment:   Personal Protective Equipment was used including: mask-surgical and hands-gloves. Patient was placed on no precaution(s). Patient was masked. Is someone accompanying this pt? No    Is the patient using any DME equipment during OV? No    Depression Screening:  3 most recent PHQ Screens 6/24/2020   Little interest or pleasure in doing things Not at all   Feeling down, depressed, irritable, or hopeless Not at all   Total Score PHQ 2 0       Learning Assessment:  Learning Assessment 2/14/2018   PRIMARY LEARNER Patient   PRIMARY LANGUAGE ENGLISH   LEARNER PREFERENCE PRIMARY PICTURES   ANSWERED BY patient   RELATIONSHIP SELF       Abuse Screening:  Abuse Screening Questionnaire 3/14/2018   Do you ever feel afraid of your partner? N   Are you in a relationship with someone who physically or mentally threatens you? N   Is it safe for you to go home? Y       Fall Risk  Fall Risk Assessment, last 12 mths 1/4/2017   Able to walk? Yes   Fall in past 12 months? Yes   Number of falls in past 12 months 5   Fall with injury? 1       Pt currently taking Anticoagulant therapy? No    Coordination of Care:  1. Have you been to the ER, urgent care clinic since your last visit? Hospitalized since your last visit? N/A    2. Have you seen or consulted any other health care providers outside of the [address] [place] [address] since your last visit? Include any pap smears or colon screening.  N/A

## 2020-12-30 PROBLEM — K70.9 LIVER DISEASE DUE TO ALCOHOL (HCC): Status: ACTIVE | Noted: 2020-12-30

## 2020-12-30 PROBLEM — K70.11 ASCITES DUE TO ALCOHOLIC HEPATITIS: Status: ACTIVE | Noted: 2020-12-30

## 2020-12-30 NOTE — PROGRESS NOTES
[facility]    CC: F/U of Bilateral Leg Edema and Abdominal Distension    HPI:       Bilateral Leg Edema and Abdominal Distension:  -Got requested imaging and lab work (Lab has not sent results to office for review)  -Taking Lasix as prescribed  -Denies any side effects or issues with the medication regimen  -States he has had improvement in issue since last visit  -Reports drinking less alcohol      ROS: Positive items marked in RED  CON: fever, chills  Cardiovascular: palpitations, CP  Resp: SOB, cough  GI: nausea, vomiting, diarrhea  : dysuria, hematuria      Past Medical History:   Diagnosis Date    Alcohol abuse 8/22/2013    CAD (coronary artery disease)     \"cardiac stent placed 1 year ago\"    Depression     Diastolic dysfunction 9/8/8922    [facility], [facility]) [facility]     Hypertension     Mild concentric left ventricular hypertrophy (LVH) 7/9/2018    Mitral regurgitation 7/9/2018    Substance abuse ([facility]     Suicidal thoughts     Tricuspid regurgitation 7/8/8775    Umbilical hernia        Past Surgical History:   Procedure Laterality Date    HX BACK SURGERY  2014    HX CORONARY STENT PLACEMENT  2012    No stent placed       Family History   Problem Relation Age of Onset    Suicide Father     Diabetes Brother     Psychiatric Disorder Other        Social History     Tobacco Use    Smoking status: Never Smoker    Smokeless tobacco: Never Used   Substance Use Topics    Alcohol use:  Yes     Alcohol/week: 63.0 standard drinks     Types: 63 Cans of beer per week    Drug use: No     Types: Benzodiazepines       Allergies   Allergen Reactions    Aleve [Naproxen Sodium] Itching and Hives    Naproxen Hives         Current Outpatient Medications:     metFORMIN (GLUCOPHAGE) 1,000 mg tablet, take 1 tablet by mouth twice a day with meals, Disp: 180 Tab, Rfl: 1    escitalopram oxalate (LEXAPRO) 5 mg tablet, take 1 tablet by mouth once daily, Disp: , Rfl:     metoprolol succinate (Toprol XL) 200 mg XL tablet, Take 1 Tab by mouth., Disp: , Rfl:     apixaban (ELIQUIS) 5 mg tablet, Take 1 Tab by mouth two (2) times a day., Disp: 60 Tab, Rfl: 3    amiodarone (PACERONE) 100 mg tablet, Take 1 Tab by mouth daily. , Disp: 30 Tab, Rfl: 0    furosemide (LASIX) 20 mg tablet, Take 1 Tab by mouth daily. , Disp: 7 Tab, Rfl: 1    QUEtiapine SR (SEROquel XR) 300 mg sr tablet, Take 2 Tabs by mouth nightly., Disp: 60 Tab, Rfl: 1    ferrous sulfate (IRON) 325 mg (65 mg iron) EC tablet, take 1 tablet by mouth once daily, Disp: 90 Tab, Rfl: 1    folic acid (FOLVITE) 1 mg tablet, Take 1 mg by mouth daily. , Disp: , Rfl:     thiamine HCL (B-1) 100 mg tablet, take 1 tablet by mouth once daily, Disp: , Rfl:     lisinopril-hydroCHLOROthiazide (PRINZIDE, ZESTORETIC) 20-25 mg per tablet, Take 2 Tabs by mouth daily. , Disp: 60 Tab, Rfl: 2    glimepiride (AMARYL) 4 mg tablet, Take 2 Tabs by mouth every morning., Disp: 60 Tab, Rfl: 3    magnesium oxide (MAG-OX) 400 mg tablet, Take 1 Tab by mouth daily.  Indications: low amount of magnesium in the blood, Disp: 90 Tab, Rfl: 2    omeprazole (PRILOSEC) 40 mg capsule, take 1 capsule by mouth every morning before breakfast, Disp: , Rfl:     Blood-Glucose Meter (OneTouch Verio IQ Meter) misc, Use to check blood sugar daily, Disp: 1 Each, Rfl: 0    glucose blood VI test strips (OneTouch Verio test strips) strip, Use to check blood sugar daily, Disp: 100 Strip, Rfl: 5    glucose blood VI test strips (ASCENSIA AUTODISC VI, ONE TOUCH ULTRA TEST VI) strip, Use as directed to test blood sugar twice a day, Disp: 100 Strip, Rfl: 6    Physical Exam:      /89   Pulse (!) 111   Temp 97.8 °F (36.6 °C) (Temporal)   Resp 18   Ht 5' 10\" (1.778 m)   Wt 218 lb (98.9 kg)   SpO2 97%   BMI 31.28 kg/m²     General: obese habitus, NAD, conversant  Eyes: sclera clear bilaterally, no discharge noted, eyelids normal in appearance  HENT: NCAT  Lungs: CTAB, normal respiratory effort and rate  CV: RRR, no MRGs  ABD: no guarding, non-tender, distended (Unchanged from prior visit), umbilical hernia noted, normal bowel sounds  Ext: bilateral pitting edema in lower extremities noted (Improved from prior visit), no digital cyanosis noted  Skin: normal temperature, turgor, color, and texture  Psych: alert and oriented to person, place and situation, normal affect  Neuro: speech normal, moving all extremities, gait normal      US ABD COMP (11/27/2020): IMPRESSION:   1. Hepatosplenomegaly. Coarse increased echotexture of the liver compatible with  hepatocellular disease and possibly cirrhosis. 2. Moderate ascites. 3. Nonvisualization of the pancreas.     Assessment/Plan     Ascites due to Alcoholic Hepatitis, unchanged:  -Cause of his abdominal distension  -Warned that he appears to have developed some degree of liver cirrhosis/failure  -Advised to stop drinking alcohol  -Will continue current Lasix regimen  -Referred to GI specialist for evaluation  -Handout given on cirrhosis care  -Follow-up in 1 month to establish care with new PCP      Bilateral Leg Edema, improving:  -Likely due to liver cirrhosis/failure due to his alcohol use  -DDx includes CHF  -Patient counseled on likely etiology and strongly advised to stop drinking alcohol  -Will continue current Lasix regimen  -Referred to GI specialist for evaluation  -Follow-up in 1 month to establish care with new PCP        Adenike Galdamez MD  12/16/2020, 9:40 AM

## 2021-01-29 ENCOUNTER — TRANSCRIBE ORDER (OUTPATIENT)
Dept: SCHEDULING | Age: 61
End: 2021-01-29

## 2021-01-29 DIAGNOSIS — R18.8 ASCITES: ICD-10-CM

## 2021-01-29 DIAGNOSIS — R74.8 ACID PHOSPHATASE ELEVATED: Primary | ICD-10-CM

## 2021-02-12 ENCOUNTER — APPOINTMENT (OUTPATIENT)
Dept: ULTRASOUND IMAGING | Age: 61
DRG: 280 | End: 2021-02-12
Attending: EMERGENCY MEDICINE
Payer: MEDICAID

## 2021-02-12 ENCOUNTER — HOSPITAL ENCOUNTER (INPATIENT)
Age: 61
LOS: 8 days | Discharge: ANOTHER HOSPITAL | DRG: 280 | End: 2021-02-20
Attending: EMERGENCY MEDICINE | Admitting: FAMILY MEDICINE
Payer: MEDICAID

## 2021-02-12 ENCOUNTER — APPOINTMENT (OUTPATIENT)
Dept: CT IMAGING | Age: 61
DRG: 280 | End: 2021-02-12
Attending: EMERGENCY MEDICINE
Payer: MEDICAID

## 2021-02-12 ENCOUNTER — APPOINTMENT (OUTPATIENT)
Dept: GENERAL RADIOLOGY | Age: 61
DRG: 280 | End: 2021-02-12
Attending: FAMILY MEDICINE
Payer: MEDICAID

## 2021-02-12 DIAGNOSIS — K70.11 ASCITES DUE TO ALCOHOLIC HEPATITIS: ICD-10-CM

## 2021-02-12 DIAGNOSIS — K72.01 ACUTE LIVER FAILURE WITH HEPATIC COMA (HCC): ICD-10-CM

## 2021-02-12 DIAGNOSIS — N17.9 ACUTE KIDNEY INJURY (HCC): ICD-10-CM

## 2021-02-12 DIAGNOSIS — I10 ESSENTIAL HYPERTENSION WITH GOAL BLOOD PRESSURE LESS THAN 130/80: ICD-10-CM

## 2021-02-12 DIAGNOSIS — F10.10 ALCOHOL ABUSE: ICD-10-CM

## 2021-02-12 DIAGNOSIS — R18.8 CIRRHOSIS OF LIVER WITH ASCITES, UNSPECIFIED HEPATIC CIRRHOSIS TYPE (HCC): Primary | ICD-10-CM

## 2021-02-12 DIAGNOSIS — E87.1 HYPONATREMIA: ICD-10-CM

## 2021-02-12 DIAGNOSIS — B15.9 ACUTE HEPATITIS A: ICD-10-CM

## 2021-02-12 DIAGNOSIS — D68.9 COAGULOPATHY (HCC): ICD-10-CM

## 2021-02-12 DIAGNOSIS — I48.0 PAROXYSMAL ATRIAL FIBRILLATION (HCC): ICD-10-CM

## 2021-02-12 DIAGNOSIS — K74.60 CIRRHOSIS OF LIVER WITH ASCITES, UNSPECIFIED HEPATIC CIRRHOSIS TYPE (HCC): Primary | ICD-10-CM

## 2021-02-12 LAB
ALBUMIN SERPL-MCNC: 2.4 G/DL (ref 3.4–5)
ALBUMIN/GLOB SERPL: 0.4 {RATIO} (ref 0.8–1.7)
ALP SERPL-CCNC: 222 U/L (ref 45–117)
ALT SERPL-CCNC: 676 U/L (ref 16–61)
ANION GAP SERPL CALC-SCNC: 8 MMOL/L (ref 3–18)
APTT PPP: 37.6 SEC (ref 23–36.4)
AST SERPL-CCNC: 1065 U/L (ref 10–38)
BASOPHILS # BLD: 0 K/UL (ref 0–0.1)
BASOPHILS NFR BLD: 0 % (ref 0–2)
BILIRUB SERPL-MCNC: 15.8 MG/DL (ref 0.2–1)
BUN SERPL-MCNC: 30 MG/DL (ref 7–18)
BUN/CREAT SERPL: 15 (ref 12–20)
CALCIUM SERPL-MCNC: 7.4 MG/DL (ref 8.5–10.1)
CHLORIDE SERPL-SCNC: 89 MMOL/L (ref 100–111)
CO2 SERPL-SCNC: 26 MMOL/L (ref 21–32)
CREAT SERPL-MCNC: 1.94 MG/DL (ref 0.6–1.3)
DIFFERENTIAL METHOD BLD: ABNORMAL
EOSINOPHIL # BLD: 0 K/UL (ref 0–0.4)
EOSINOPHIL NFR BLD: 1 % (ref 0–5)
ERYTHROCYTE [DISTWIDTH] IN BLOOD BY AUTOMATED COUNT: 16.4 % (ref 11.6–14.5)
ETHANOL SERPL-MCNC: <3 MG/DL (ref 0–3)
GLOBULIN SER CALC-MCNC: 6 G/DL (ref 2–4)
GLUCOSE SERPL-MCNC: 255 MG/DL (ref 74–99)
HAV IGM SER QL: POSITIVE
HBV CORE IGM SER QL: NEGATIVE
HBV SURFACE AG SER QL: <0.1 INDEX
HBV SURFACE AG SER QL: NEGATIVE
HCT VFR BLD AUTO: 34.3 % (ref 36–48)
HCV AB SER IA-ACNC: 0.48 INDEX
HCV AB SERPL QL IA: NEGATIVE
HCV COMMENT,HCGAC: ABNORMAL
HGB BLD-MCNC: 12.4 G/DL (ref 13–16)
INR PPP: 1.8 (ref 0.8–1.2)
IRON SATN MFR SERPL: 74 % (ref 20–50)
IRON SERPL-MCNC: 138 UG/DL (ref 50–175)
LIPASE SERPL-CCNC: 592 U/L (ref 73–393)
LYMPHOCYTES # BLD: 0.4 K/UL (ref 0.9–3.6)
LYMPHOCYTES NFR BLD: 15 % (ref 21–52)
MCH RBC QN AUTO: 30.7 PG (ref 24–34)
MCHC RBC AUTO-ENTMCNC: 36.2 G/DL (ref 31–37)
MCV RBC AUTO: 84.9 FL (ref 74–97)
MONOCYTES # BLD: 0.4 K/UL (ref 0.05–1.2)
MONOCYTES NFR BLD: 12 % (ref 3–10)
NEUTS SEG # BLD: 2 K/UL (ref 1.8–8)
NEUTS SEG NFR BLD: 72 % (ref 40–73)
PLATELET # BLD AUTO: 104 K/UL (ref 135–420)
PMV BLD AUTO: 9.7 FL (ref 9.2–11.8)
POTASSIUM SERPL-SCNC: 3.5 MMOL/L (ref 3.5–5.5)
PROT SERPL-MCNC: 8.4 G/DL (ref 6.4–8.2)
PROTHROMBIN TIME: 20.2 SEC (ref 11.5–15.2)
RBC # BLD AUTO: 4.04 M/UL (ref 4.7–5.5)
SODIUM SERPL-SCNC: 123 MMOL/L (ref 136–145)
SP1: ABNORMAL
SP2: ABNORMAL
SP3: ABNORMAL
TIBC SERPL-MCNC: 186 UG/DL (ref 250–450)
WBC # BLD AUTO: 2.8 K/UL (ref 4.6–13.2)

## 2021-02-12 PROCEDURE — 74176 CT ABD & PELVIS W/O CONTRAST: CPT

## 2021-02-12 PROCEDURE — 99283 EMERGENCY DEPT VISIT LOW MDM: CPT

## 2021-02-12 PROCEDURE — 85730 THROMBOPLASTIN TIME PARTIAL: CPT

## 2021-02-12 PROCEDURE — 83540 ASSAY OF IRON: CPT

## 2021-02-12 PROCEDURE — 99223 1ST HOSP IP/OBS HIGH 75: CPT | Performed by: FAMILY MEDICINE

## 2021-02-12 PROCEDURE — 82077 ASSAY SPEC XCP UR&BREATH IA: CPT

## 2021-02-12 PROCEDURE — 85025 COMPLETE CBC W/AUTO DIFF WBC: CPT

## 2021-02-12 PROCEDURE — 96374 THER/PROPH/DIAG INJ IV PUSH: CPT

## 2021-02-12 PROCEDURE — 87040 BLOOD CULTURE FOR BACTERIA: CPT

## 2021-02-12 PROCEDURE — 74011250636 HC RX REV CODE- 250/636: Performed by: EMERGENCY MEDICINE

## 2021-02-12 PROCEDURE — 80074 ACUTE HEPATITIS PANEL: CPT

## 2021-02-12 PROCEDURE — 83690 ASSAY OF LIPASE: CPT

## 2021-02-12 PROCEDURE — 71045 X-RAY EXAM CHEST 1 VIEW: CPT

## 2021-02-12 PROCEDURE — 76705 ECHO EXAM OF ABDOMEN: CPT

## 2021-02-12 PROCEDURE — 80053 COMPREHEN METABOLIC PANEL: CPT

## 2021-02-12 PROCEDURE — 74011250636 HC RX REV CODE- 250/636: Performed by: FAMILY MEDICINE

## 2021-02-12 PROCEDURE — 74011250637 HC RX REV CODE- 250/637: Performed by: FAMILY MEDICINE

## 2021-02-12 PROCEDURE — 65270000029 HC RM PRIVATE

## 2021-02-12 PROCEDURE — P9047 ALBUMIN (HUMAN), 25%, 50ML: HCPCS | Performed by: EMERGENCY MEDICINE

## 2021-02-12 PROCEDURE — 96361 HYDRATE IV INFUSION ADD-ON: CPT

## 2021-02-12 PROCEDURE — 85610 PROTHROMBIN TIME: CPT

## 2021-02-12 RX ORDER — NADOLOL 40 MG/1
20 TABLET ORAL DAILY
Status: DISCONTINUED | OUTPATIENT
Start: 2021-02-12 | End: 2021-02-20 | Stop reason: HOSPADM

## 2021-02-12 RX ORDER — SODIUM CHLORIDE 0.9 % (FLUSH) 0.9 %
5-40 SYRINGE (ML) INJECTION AS NEEDED
Status: DISCONTINUED | OUTPATIENT
Start: 2021-02-12 | End: 2021-02-20 | Stop reason: HOSPADM

## 2021-02-12 RX ORDER — FOLIC ACID 1 MG/1
1 TABLET ORAL DAILY
Status: DISCONTINUED | OUTPATIENT
Start: 2021-02-13 | End: 2021-02-20 | Stop reason: HOSPADM

## 2021-02-12 RX ORDER — THERA TABS 400 MCG
1 TAB ORAL DAILY
Status: DISCONTINUED | OUTPATIENT
Start: 2021-02-13 | End: 2021-02-20 | Stop reason: HOSPADM

## 2021-02-12 RX ORDER — PROMETHAZINE HYDROCHLORIDE 25 MG/1
12.5 TABLET ORAL
Status: DISCONTINUED | OUTPATIENT
Start: 2021-02-12 | End: 2021-02-20 | Stop reason: HOSPADM

## 2021-02-12 RX ORDER — ONDANSETRON 2 MG/ML
4 INJECTION INTRAMUSCULAR; INTRAVENOUS
Status: DISCONTINUED | OUTPATIENT
Start: 2021-02-12 | End: 2021-02-20 | Stop reason: HOSPADM

## 2021-02-12 RX ORDER — POLYETHYLENE GLYCOL 3350 17 G/17G
17 POWDER, FOR SOLUTION ORAL DAILY PRN
Status: DISCONTINUED | OUTPATIENT
Start: 2021-02-12 | End: 2021-02-20 | Stop reason: HOSPADM

## 2021-02-12 RX ORDER — PANTOPRAZOLE SODIUM 40 MG/1
40 TABLET, DELAYED RELEASE ORAL
Status: DISCONTINUED | OUTPATIENT
Start: 2021-02-13 | End: 2021-02-20 | Stop reason: HOSPADM

## 2021-02-12 RX ORDER — ALBUMIN HUMAN 250 G/1000ML
25 SOLUTION INTRAVENOUS ONCE
Status: COMPLETED | OUTPATIENT
Start: 2021-02-12 | End: 2021-02-12

## 2021-02-12 RX ORDER — QUETIAPINE 300 MG/1
600 TABLET, FILM COATED, EXTENDED RELEASE ORAL
Status: DISCONTINUED | OUTPATIENT
Start: 2021-02-12 | End: 2021-02-20 | Stop reason: HOSPADM

## 2021-02-12 RX ORDER — SODIUM CHLORIDE 0.9 % (FLUSH) 0.9 %
5-40 SYRINGE (ML) INJECTION EVERY 8 HOURS
Status: DISCONTINUED | OUTPATIENT
Start: 2021-02-12 | End: 2021-02-20 | Stop reason: HOSPADM

## 2021-02-12 RX ORDER — LANOLIN ALCOHOL/MO/W.PET/CERES
600 CREAM (GRAM) TOPICAL
Status: DISCONTINUED | OUTPATIENT
Start: 2021-02-12 | End: 2021-02-20 | Stop reason: HOSPADM

## 2021-02-12 RX ADMIN — QUETIAPINE FUMARATE 600 MG: 300 TABLET, EXTENDED RELEASE ORAL at 22:35

## 2021-02-12 RX ADMIN — ALBUMIN (HUMAN) 25 G: 0.25 INJECTION, SOLUTION INTRAVENOUS at 15:16

## 2021-02-12 RX ADMIN — Medication 10 ML: at 15:27

## 2021-02-12 RX ADMIN — Medication 600 MG: at 22:34

## 2021-02-12 RX ADMIN — SODIUM CHLORIDE 500 ML: 900 INJECTION, SOLUTION INTRAVENOUS at 12:00

## 2021-02-12 RX ADMIN — Medication 10 ML: at 22:00

## 2021-02-12 RX ADMIN — ONDANSETRON 4 MG: 2 INJECTION INTRAMUSCULAR; INTRAVENOUS at 16:04

## 2021-02-12 NOTE — ED PROVIDER NOTES
EMERGENCY DEPARTMENT HISTORY AND PHYSICAL EXAM    11:59 AM    Date: 2/12/2021  Patient Name: Idalia Landry    History of Presenting Illness     Chief Complaint   Patient presents with    Jaundice       History Provided By: Patient  Location/Duration/Severity/Modifying factors   Patient is a 50-year-old male with a history of alcohol abuse, coronary artery disease, A. fib on Eliquis presented to the emergency department with complaints of jaundice. Patient reports over the past 1 to 2 weeks he has noted progressive yellowing of the skin. He was supposed to have an ultrasound-guided paracentesis done today by interventional radiology when he received a call and was told to go to the ER immediately. He states he has intermittent generalized abdominal pain. Nothing makes it better or worse. He does take Imodium that does sometimes seem to improve it. He is also having some diarrhea. Notes decreased swelling in his legs recently but had been swollen previously. He has had abdominal distention as well. He has not been experiencing any chest pain. Not feeling short of breath. No fever.           PCP: Tamera Escobar MD    Current Facility-Administered Medications   Medication Dose Route Frequency Provider Last Rate Last Admin    sodium chloride (NS) flush 5-40 mL  5-40 mL IntraVENous Q8H Marny Nine, DO   10 mL at 02/12/21 1527    sodium chloride (NS) flush 5-40 mL  5-40 mL IntraVENous PRN Marny Nine, DO        polyethylene glycol (MIRALAX) packet 17 g  17 g Oral DAILY PRN Marny Nine, DO        promethazine (PHENERGAN) tablet 12.5 mg  12.5 mg Oral Q6H PRN Marny Nine, DO        Or    ondansetron [facility]) injection 4 mg  4 mg IntraVENous Q6H PRN Marny Nine, DO   4 mg at 02/12/21 1604    lactulose (CHRONULAC) 10 gram/15 mL solution 30 mL  30 mL Oral BID ALIVIA Blackburn         Current Outpatient Medications   Medication Sig Dispense Refill    lisinopril-hydroCHLOROthiazide (PRINZIDE, ZESTORETIC) 20-25 mg per tablet Take 2 Tabs by mouth daily. 60 Tab 2    metFORMIN (GLUCOPHAGE) 1,000 mg tablet take 1 tablet by mouth twice a day with meals 180 Tab 1    escitalopram oxalate (LEXAPRO) 5 mg tablet take 1 tablet by mouth once daily      metoprolol succinate (Toprol XL) 200 mg XL tablet Take 1 Tab by mouth.  apixaban (ELIQUIS) 5 mg tablet Take 1 Tab by mouth two (2) times a day. 60 Tab 3    amiodarone (PACERONE) 100 mg tablet Take 1 Tab by mouth daily. 30 Tab 0    furosemide (LASIX) 20 mg tablet Take 1 Tab by mouth daily. 7 Tab 1    QUEtiapine SR (SEROquel XR) 300 mg sr tablet Take 2 Tabs by mouth nightly. 60 Tab 1    ferrous sulfate (IRON) 325 mg (65 mg iron) EC tablet take 1 tablet by mouth once daily 90 Tab 1    folic acid (FOLVITE) 1 mg tablet Take 1 mg by mouth daily.  thiamine HCL (B-1) 100 mg tablet take 1 tablet by mouth once daily      omeprazole (PRILOSEC) 40 mg capsule take 1 capsule by mouth every morning before breakfast      Blood-Glucose Meter (OneTouch Verio IQ Meter) misc Use to check blood sugar daily 1 Each 0    glucose blood VI test strips (OneTouch Verio test strips) strip Use to check blood sugar daily 100 Strip 5    glimepiride (AMARYL) 4 mg tablet Take 2 Tabs by mouth every morning. 60 Tab 3    glucose blood VI test strips (ASCENSIA AUTODISC VI, ONE TOUCH ULTRA TEST VI) strip Use as directed to test blood sugar twice a day 100 Strip 6    magnesium oxide (MAG-OX) 400 mg tablet Take 1 Tab by mouth daily.  Indications: low amount of magnesium in the blood 90 Tab 2       Past History     Past Medical History:  Past Medical History:   Diagnosis Date    A-fib ([facility]     Alcohol abuse 8/22/2013    CAD (coronary artery disease)     Non-obstructive LAD by cath (2016)    Depression     [facility] 2) [facility]     Hypertension     Substance abuse ([facility]     Suicidal thoughts     Umbilical hernia Past Surgical History:  Past Surgical History:   Procedure Laterality Date    HX BACK SURGERY  2014    HX CORONARY STENT PLACEMENT  2012    No stent placed       Family History:  Family History   Problem Relation Age of Onset    Suicide Father     Diabetes Brother     Psychiatric Disorder Other        Social History:  Social History     Tobacco Use    Smoking status: Never Smoker    Smokeless tobacco: Never Used   Substance Use Topics    Alcohol use: Yes     Alcohol/week: 63.0 standard drinks     Types: 63 Cans of beer per week    Drug use: No     Types: Benzodiazepines       Allergies: Allergies   Allergen Reactions    Aleve [Naproxen Sodium] Itching and Hives    Naproxen Hives       I reviewed and confirmed the above information with patient and updated as necessary. Review of Systems     Review of Systems   Constitutional: Negative for chills and fever. HENT: Negative for congestion, rhinorrhea, sinus pressure and sneezing. Eyes: Negative for visual disturbance. Respiratory: Negative for cough and shortness of breath. Cardiovascular: Negative for chest pain. Gastrointestinal: Positive for abdominal distention and abdominal pain (Occasional). Negative for diarrhea, nausea and vomiting. Genitourinary: Negative for difficulty urinating, dysuria, frequency and urgency. Musculoskeletal: Negative for back pain and neck pain. Skin: Positive for color change. Negative for rash. Neurological: Negative for syncope, numbness and headaches. Physical Exam     Visit Vitals  BP (!) 92/59 (BP 1 Location: Left upper arm, BP Patient Position: At rest)   Pulse 95   Temp 97.5 °F (36.4 °C)   Resp 18   SpO2 100%       Physical Exam  Constitutional:       General: He is not in acute distress. Appearance: Normal appearance. He is normal weight. He is not ill-appearing or toxic-appearing. HENT:      Head: Normocephalic and atraumatic.       Right Ear: External ear normal.      Left Ear: External ear normal.      Nose: Nose normal.      Mouth/Throat:      Mouth: Mucous membranes are moist.      Pharynx: No oropharyngeal exudate or posterior oropharyngeal erythema. Eyes:      General: Scleral icterus present. Conjunctiva/sclera: Conjunctivae normal.      Pupils: Pupils are equal, round, and reactive to light. Neck:      Musculoskeletal: Normal range of motion and neck supple. Cardiovascular:      Rate and Rhythm: Normal rate and regular rhythm. Pulses: Normal pulses. Heart sounds: Normal heart sounds. No murmur. No friction rub. Pulmonary:      Effort: Pulmonary effort is normal.      Breath sounds: Normal breath sounds. No wheezing, rhonchi or rales. Abdominal:      General: Abdomen is flat. Tenderness: There is no abdominal tenderness. There is no guarding or rebound. Comments: Mildly protuberant, no elicited tenderness, no right upper quadrant tenderness. Musculoskeletal: Normal range of motion. General: No swelling or tenderness. Right lower leg: No edema. Left lower leg: No edema. Skin:     General: Skin is warm and dry. Capillary Refill: Capillary refill takes less than 2 seconds. Coloration: Skin is jaundiced. Findings: No rash. Neurological:      General: No focal deficit present. Mental Status: He is alert. Motor: No weakness.          Diagnostic Study Results     Labs -  Recent Results (from the past 24 hour(s))   CBC WITH AUTOMATED DIFF    Collection Time: 02/12/21 11:07 AM   Result Value Ref Range    WBC 2.8 (L) 4.6 - 13.2 K/uL    RBC 4.04 (L) 4.70 - 5.50 M/uL    HGB 12.4 (L) 13.0 - 16.0 g/dL    HCT 34.3 (L) 36.0 - 48.0 %    MCV 84.9 74.0 - 97.0 FL    MCH 30.7 24.0 - 34.0 PG    MCHC 36.2 31.0 - 37.0 g/dL    RDW 16.4 (H) 11.6 - 14.5 %    PLATELET 929 (L) 359 - 420 K/uL    MPV 9.7 9.2 - 11.8 FL    NEUTROPHILS 72 40 - 73 %    LYMPHOCYTES 15 (L) 21 - 52 %    MONOCYTES 12 (H) 3 - 10 %    EOSINOPHILS 1 0 - 5 % BASOPHILS 0 0 - 2 %    ABS. NEUTROPHILS 2.0 1.8 - 8.0 K/UL    ABS. LYMPHOCYTES 0.4 (L) 0.9 - 3.6 K/UL    ABS. MONOCYTES 0.4 0.05 - 1.2 K/UL    ABS. EOSINOPHILS 0.0 0.0 - 0.4 K/UL    ABS. BASOPHILS 0.0 0.0 - 0.1 K/UL    DF AUTOMATED     METABOLIC PANEL, COMPREHENSIVE    Collection Time: 02/12/21 11:07 AM   Result Value Ref Range    Sodium 123 (L) 136 - 145 mmol/L    Potassium 3.5 3.5 - 5.5 mmol/L    Chloride 89 (L) 100 - 111 mmol/L    CO2 26 21 - 32 mmol/L    Anion gap 8 3.0 - 18 mmol/L    Glucose 255 (H) 74 - 99 mg/dL    BUN 30 (H) 7.0 - 18 MG/DL    Creatinine 1.94 (H) 0.6 - 1.3 MG/DL    BUN/Creatinine ratio 15 12 - 20      GFR est AA 43 (L) >60 ml/min/1.73m2    GFR est non-AA 36 (L) >60 ml/min/1.73m2    Calcium 7.4 (L) 8.5 - 10.1 MG/DL    Bilirubin, total 15.8 (H) 0.2 - 1.0 MG/DL    ALT (SGPT) 676 (H) 16 - 61 U/L    AST (SGOT) 1,065 (H) 10 - 38 U/L    Alk. phosphatase 222 (H) 45 - 117 U/L    Protein, total 8.4 (H) 6.4 - 8.2 g/dL    Albumin 2.4 (L) 3.4 - 5.0 g/dL    Globulin 6.0 (H) 2.0 - 4.0 g/dL    A-G Ratio 0.4 (L) 0.8 - 1.7     ETHYL ALCOHOL    Collection Time: 02/12/21 11:07 AM   Result Value Ref Range    ALCOHOL(ETHYL),SERUM <3 0 - 3 MG/DL   LIPASE    Collection Time: 02/12/21 11:07 AM   Result Value Ref Range    Lipase 592 (H) 73 - 393 U/L   PROTHROMBIN TIME + INR    Collection Time: 02/12/21 11:07 AM   Result Value Ref Range    Prothrombin time 20.2 (H) 11.5 - 15.2 sec    INR 1.8 (H) 0.8 - 1.2     PTT    Collection Time: 02/12/21 11:07 AM   Result Value Ref Range    aPTT 37.6 (H) 23.0 - 36.4 SEC   HEPATITIS PANEL, ACUTE    Collection Time: 02/12/21 11:07 AM   Result Value Ref Range    Hepatitis A, IgM Positive (A) NEG      __          Hepatitis B surface Ag <0.10 <1.00 Index    Hep B surface Ag Interp. Negative NEG      __          Hepatitis B core, IgM Negative NEG      __          Hepatitis C virus Ab 0.48 <0.80 Index    Hep C virus Ab Interp.  Negative NEG      Hep C  virus Ab comment         IRON PROFILE Collection Time: 02/12/21 11:07 AM   Result Value Ref Range    Iron 138 50 - 175 ug/dL    TIBC 186 (L) 250 - 450 ug/dL    Iron % saturation 74 (H) 20 - 50 %         Radiologic Studies -   US ABD LTD   Final Result   1. Findings consistent with hepatocellular disease. 2.  Mild ascites. 3.  Cholelithiasis but no distinct evidence to suggest acute cholecystitis. CT ABD PELV WO CONT   Final Result   1. Nodular hepatic contour suggesting underlying cirrhosis. Discrete mass or   biliary dilation not appreciated. 2. Splenomegaly. Gastrohepatic varices. Recanalized umbilical vein. Mesenteric   edema and abdominal and pelvic ascites. Findings consistent with portal   hypertension. 3. No bowel distention to suggest obstruction. US GUIDE PARACENTESIS    (Results Pending)           Medical Decision Making   I am the first provider for this patient. I reviewed the vital signs, available nursing notes, past medical history, past surgical history, family history and social history. Vital Signs-Reviewed the patient's vital signs. Records Reviewed: Nursing Notes and Old Medical Records (Time of Review: 11:59 AM)    ED Course: Progress Notes, Reevaluation, and Consults:         Provider Notes (Medical Decision Making):   MDM  Number of Diagnoses or Management Options  Acute kidney injury ([address]  Cirrhosis of liver with ascites, unspecified hepatic cirrhosis type ([address]  Coagulopathy ([address]  Hyponatremia  Diagnosis management comments: 30-year-old male with a chief complaint of jaundice. Referred here by his interventional radiologist, Dr. Israel Thomas for abnormal labs. Patient says he has intermittent dental pain but none at present. Blood pressure is borderline low suspect due to underlying liver problem no signs of sepsis. The patient indicates that he has had a 1 to 2-week history of progressive jaundice.   He denies any history of known liver problems but interestingly he has had a paracentesis done, and by chart review shows that he has a history of nonspecific liver problems but not a formal diagnosis of cirrhosis. He has yet to follow-up with GI. I do not have access to his prior labs so I will repeat them. Differential for the patient includes CBD obstruction, cholecystitis, pancreatitis, hepatitis, doubt SBP, sepsis, pancreatic mass, etc.    Labs show acute hepatitis with signs of hepatic failure. Hyponatremia. Patient denies history of jaundice and has never formally been dx'd with cirrhosis or liver failure. Given hyponatremia and declining renal function and new hepatic failure, will admit to medicine service. I will give dose of albumin now. Consulted to GI -- will provide consultation, recommendations made for supportive care. Patient updated and in agreement with plan for admission. CRITICAL CARE NOTE:    I have spent 35 minutes of critical care time involved in lab review, consultations with specialist, family decision-making, and documentation. Grace Beat this entire length of time I was immediately available to the patient.     Critical Care:  The reason for providing this level of medical care for this critically ill patient was due a critical illness that impaired one or more vital organ systems such that there was a high probability of imminent or life threatening deterioration in the patients condition. This care involved high complexity decision making to assess, manipulate, and support vital system functions, to treat this vital organ system failure and to prevent further life threatening deterioration of the patients condition. Time is exclusive of procedural and teaching time. Nasir Rice DO        Procedures        Diagnosis     Clinical Impression:   1. Cirrhosis of liver with ascites, unspecified hepatic cirrhosis type ([address]    2. Coagulopathy ([facility]    3. Hyponatremia    4.  Acute kidney injury [facility])        Disposition: Admit    Follow-up Information None          Patient's Medications   Start Taking    No medications on file   Continue Taking    AMIODARONE (PACERONE) 100 MG TABLET    Take 1 Tab by mouth daily. APIXABAN (ELIQUIS) 5 MG TABLET    Take 1 Tab by mouth two (2) times a day. BLOOD-GLUCOSE METER (ONETOUCH VERIO IQ METER) MISC    Use to check blood sugar daily    ESCITALOPRAM OXALATE (LEXAPRO) 5 MG TABLET    take 1 tablet by mouth once daily    FERROUS SULFATE (IRON) 325 MG (65 MG IRON) EC TABLET    take 1 tablet by mouth once daily    FOLIC ACID (FOLVITE) 1 MG TABLET    Take 1 mg by mouth daily. FUROSEMIDE (LASIX) 20 MG TABLET    Take 1 Tab by mouth daily. GLIMEPIRIDE (AMARYL) 4 MG TABLET    Take 2 Tabs by mouth every morning. GLUCOSE BLOOD VI TEST STRIPS (ASCENSIA AUTODISC VI, ONE TOUCH ULTRA TEST VI) STRIP    Use as directed to test blood sugar twice a day    GLUCOSE BLOOD VI TEST STRIPS (ONETOUCH VERIO TEST STRIPS) STRIP    Use to check blood sugar daily    LISINOPRIL-HYDROCHLOROTHIAZIDE (PRINZIDE, ZESTORETIC) 20-25 MG PER TABLET    Take 2 Tabs by mouth daily. MAGNESIUM OXIDE (MAG-OX) 400 MG TABLET    Take 1 Tab by mouth daily. Indications: low amount of magnesium in the blood    METFORMIN (GLUCOPHAGE) 1,000 MG TABLET    take 1 tablet by mouth twice a day with meals    METOPROLOL SUCCINATE (TOPROL XL) 200 MG XL TABLET    Take 1 Tab by mouth. OMEPRAZOLE (PRILOSEC) 40 MG CAPSULE    take 1 capsule by mouth every morning before breakfast    QUETIAPINE SR (SEROQUEL XR) 300 MG SR TABLET    Take 2 Tabs by mouth nightly. THIAMINE HCL (B-1) 100 MG TABLET    take 1 tablet by mouth once daily   These Medications have changed    No medications on file   Stop Taking    No medications on file       Yuliana Mar DO   Emergency Medicine   February 12, 2021, 11:59 AM     This note is dictated utilizing Dragon voice recognition software. Unfortunately this leads to occasional typographical errors using the voice recognition.  I apologize in advance if the situation occurs. If questions occur please do not hesitate to contact me directly.     Doyal Grumbles, DO

## 2021-02-12 NOTE — H&P
History & Physical      Patient: Riky Fox MRN: [id number]  CSN: [id number]    YOB: 1960  Age: 62 y.o. Sex: male      DOA: 2/12/2021    Chief Complaint:   Chief Complaint   Patient presents with    Jaundice          HPI:     Riky Fox is a 62 y.o. male with PMHx of alcohol abuse, A. fib on Eliquis, diastolic CHF, CAD, HTN, type II DM and depression who presented to the ED with complaints of jaundice and intermittent generalized abdominal pain. Patient is very disheveled and is a poor historian that has trouble answering specifics of most questions. He does report that over the last while he has noted progressive yellowing of his skin. He reports the timeframe on this to be 1 to 2 weeks and up to 6 months. He also complains of some abdominal distention and leg swelling, but reports his leg swelling has improved lately. He has also had diarrhea for the last 2 weeks and brown urine lately. Of note, he reports that there are at least 17 animals living in his house, including six dogs, seven cats, two hamsters and a turtle. He has seen his PCP, Dr. Mario Johnson for his liver disease and has previously Dr. Elvia Zabala in 2017, but has not had follow-up was recommended. He reports that he used to drink 5-6 high gravity beers per day for a very long time, but has cut back 6 months ago and now drinks less. Reportedly, he was scheduled to have an ultrasoundguided paracentesis today by interventional radiology, but he received a call and was told to go to the ED immediately. He denies any fevers, chills, cough, shortness of breath, chest pain, nausea, vomiting, blood in stool, or any other symptoms. In the ED, he had some low blood pressure at 92/59, with other vital signs that were reassuring.   Labs were notable for WBCs 2.8, Hgb 12.4, platelets 975, INR 1.8, PT 20.2, APTT 37.6, sodium 123, potassium 3.5, glucose 255, BUN 30, creatinine 1.94 (baseline 0.71.0), calcium 7.4, albumin 2.4, and markedly elevated LFTs with , AST 1065, alk phos 222, and lipase 592. A hepatitis panel was done and was positive for hepatitis A IgM. A CT abdomen pelvis showed findings consistent with underlying cirrhosis, splenomegaly, gastrohepatic varices, mesenteric edema along with abdominal pelvic ascites, findings consistent with portal hypertension, and no obstruction. Abdominal US showed findings consistent with liver disease, mild ascites, and gallstones but no acute cholecystitis. Patient was given IV albumin and GI was consulted. He is now admitted for decompensated liver cirrhosis, nonobstructive jaundice, acute hepatitis A and NICK. Past Medical History:   Diagnosis Date    A-fib [facility])     Alcohol abuse 8/22/2013    CAD (coronary artery disease)     Non-obstructive LAD by cath (2016)    Depression     DMII [facility]di[facility] mellitus, type 2) ([facility] [facility]     Hypertension     Substance abuse ([facility] [facility]     Suicidal thoughts     Umbilical hernia        Past Surgical History:   Procedure Laterality Date    HX BACK SURGERY  2014    HX CORONARY STENT PLACEMENT  2012    No stent placed       Family History   Problem Relation Age of Onset    Suicide Father     Diabetes Brother     Psychiatric Disorder Other        Social History     Socioeconomic History    Marital status: LEGALLY      Spouse name: Not on file    Number of children: Not on file    Years of education: Not on file    Highest education level: Not on file   Tobacco Use    Smoking status: Never Smoker    Smokeless tobacco: Never Used   Substance and Sexual Activity    Alcohol use: Yes     Alcohol/week: 63.0 standard drinks     Types: 63 Cans of beer per week    Drug use: No     Types: Benzodiazepines    Sexual activity: Not Currently   Social History Narrative     X 2. Lives with wife and 21year old son. 6year old son was taken away by .     Unemployed        Prior to Admission medications Medication Sig Start Date End Date Taking? Authorizing Provider   lisinopril-hydroCHLOROthiazide (PRINZIDE, ZESTORETIC) 20-25 mg per tablet Take 2 Tabs by mouth daily. 1/5/21   Jackie Valdez MD   metFORMIN (GLUCOPHAGE) 1,000 mg tablet take 1 tablet by mouth twice a day with meals 12/2/20   Jackie Valdez MD   escitalopram oxalate (LEXAPRO) 5 mg tablet take 1 tablet by mouth once daily 11/4/20   Provider, Historical   metoprolol succinate (Toprol XL) 200 mg XL tablet Take 1 Tab by mouth. Provider, Historical   apixaban (ELIQUIS) 5 mg tablet Take 1 Tab by mouth two (2) times a day. 11/24/20   Jcakie Valdez MD   amiodarone (PACERONE) 100 mg tablet Take 1 Tab by mouth daily. 11/24/20   Jackie Valdez MD   furosemide (LASIX) 20 mg tablet Take 1 Tab by mouth daily. 11/24/20   Jackie Valdez MD   QUEtiapine SR (SEROquel XR) 300 mg sr tablet Take 2 Tabs by mouth nightly. 11/17/20   Jackie Valdez MD   ferrous sulfate (IRON) 325 mg (65 mg iron) EC tablet take 1 tablet by mouth once daily 9/18/20   Jackie Valdez MD   folic acid (FOLVITE) 1 mg tablet Take 1 mg by mouth daily.  9/11/20   Provider, Historical   thiamine HCL (B-1) 100 mg tablet take 1 tablet by mouth once daily 9/11/20   Provider, Historical   omeprazole (PRILOSEC) 40 mg capsule take 1 capsule by mouth every morning before breakfast 9/11/20   Provider, Historical   Blood-Glucose Meter (OneTouch Verio IQ Meter) misc Use to check blood sugar daily 7/15/20   Jackie Valdez MD   glucose blood VI test strips (OneTouch Verio test strips) strip Use to check blood sugar daily 7/15/20   Jackie Valdez MD   glimepiride (AMARYL) 4 mg tablet Take 2 Tabs by mouth every morning. 6/24/20   Jackie Valdez MD   glucose blood VI test strips (ASCENSIA AUTODISC VI, ONE TOUCH ULTRA TEST VI) strip Use as directed to test blood sugar twice a day 6/24/20   Jackie Valdez MD   magnesium oxide (MAG-OX) 400 mg tablet Take 1 Tab by mouth daily. Indications: low amount of magnesium in the blood 19   Rosio Medrano MD       Allergies   Allergen Reactions    Aleve [Naproxen Sodium] Itching and Hives    Naproxen Hives         Review of Systems  GENERAL: Patient alert, awake and oriented times 3, able to communicate full sentences and not in distress. HEENT: No change in vision, sore throat or sinus congestion. NECK: No pain or stiffness. PULMONARY: No shortness of breath, cough or wheeze. Cardiovascular: no pnd or orthopnea, no CP  GASTROINTESTINAL: Positive abdominal pain and diarrhea, no nausea, vomiting or blood per rectum. GENITOURINARY: Positive for brown urine. No urinary frequency, urgency, hesitancy or dysuria. MUSCULOSKELETAL: No joint or muscle pain, no back pain, no recent trauma. Positive for BLE edema. DERMATOLOGIC: No rash, no itching, no lesions. ENDOCRINE: No polyuria, polydipsia, no heat or cold intolerance. No recent change in weight. HEMATOLOGICAL: No anemia or easy bruising or bleeding. NEUROLOGIC: No headache, seizures, numbness, tingling or weakness. Physical Exam:     Physical Exam:  Visit Vitals  BP (!) 92/59 (BP 1 Location: Left upper arm, BP Patient Position: At rest)   Pulse 95   Temp 97.5 °F (36.4 °C)   Resp 18   SpO2 100%      O2 Device: Room air    Temp (24hrs), Av.5 °F (36.4 °C), Min:97.5 °F (36.4 °C), Max:97.5 °F (36.4 °C)    No intake/output data recorded. No intake/output data recorded. General:  Alert, cooperative, no distress, appears disheveled and older than stated age              Head: Normocephalic, without obvious abnormality, atraumatic. Eyes:  Conjunctivae/corneas clear. PERRL, EOMs intact. Icterus present   Nose: Nares normal. No drainage or sinus tenderness. Neck: Supple, symmetrical, trachea midline, no JVD. Lungs:   Clear to auscultation bilaterally.    Heart:  Regular rate and rhythm, S1, S2 normal.     Abdomen: Soft, nontender, distended Extremities: Extremities normal, atraumatic, no cyanosis or edema. Pulses: 2+ and symmetric all extremities. Skin:  Jaundice all over   Neurologic: AAOx3, No focal motor or sensory deficit.        Labs Reviewed:    BMP:   Lab Results   Component Value Date/Time     (L) 02/12/2021 11:07 AM    K 3.5 02/12/2021 11:07 AM    CL 89 (L) 02/12/2021 11:07 AM    CO2 26 02/12/2021 11:07 AM    AGAP 8 02/12/2021 11:07 AM     (H) 02/12/2021 11:07 AM    BUN 30 (H) 02/12/2021 11:07 AM    CREA 1.94 (H) 02/12/2021 11:07 AM    GFRAA 43 (L) 02/12/2021 11:07 AM    GFRNA 36 (L) 02/12/2021 11:07 AM     CMP:   Lab Results   Component Value Date/Time     (L) 02/12/2021 11:07 AM    K 3.5 02/12/2021 11:07 AM    CL 89 (L) 02/12/2021 11:07 AM    CO2 26 02/12/2021 11:07 AM    AGAP 8 02/12/2021 11:07 AM     (H) 02/12/2021 11:07 AM    BUN 30 (H) 02/12/2021 11:07 AM    CREA 1.94 (H) 02/12/2021 11:07 AM    GFRAA 43 (L) 02/12/2021 11:07 AM    GFRNA 36 (L) 02/12/2021 11:07 AM    CA 7.4 (L) 02/12/2021 11:07 AM    ALB 2.4 (L) 02/12/2021 11:07 AM    TP 8.4 (H) 02/12/2021 11:07 AM    GLOB 6.0 (H) 02/12/2021 11:07 AM    AGRAT 0.4 (L) 02/12/2021 11:07 AM     (H) 02/12/2021 11:07 AM     CBC:   Lab Results   Component Value Date/Time    WBC 2.8 (L) 02/12/2021 11:07 AM    HGB 12.4 (L) 02/12/2021 11:07 AM    HCT 34.3 (L) 02/12/2021 11:07 AM     (L) 02/12/2021 11:07 AM     All Cardiac Markers in the last 24 hours: No results found for: CPK, CK, CKMMB, CKMB, RCK3, CKMBT, CKNDX, CKND1, KIERRA, TROPT, TROIQ, INGA, TROPT, TNIPOC, BNP, BNPP  COAGS:   Lab Results   Component Value Date/Time    APTT 37.6 (H) 02/12/2021 11:07 AM    PTP 20.2 (H) 02/12/2021 11:07 AM    INR 1.8 (H) 02/12/2021 11:07 AM     Pancreatic Markers:   Lab Results   Component Value Date/Time    LPSE 592 (H) 02/12/2021 11:07 AM       Procedures/imaging: see electronic medical records for all procedures/Xrays and details which were not copied into this note but were reviewed prior to creation of [place] [address] is a 62 y.o. male with PMHx of alcohol abuse, A. fib on Eliquis, diastolic CHF, CAD, HTN, type II DM and depression who is now admitted for decompensated liver cirrhosis, nonobstructive jaundice, acute hepatitis A and NICK. 1. Decompensated liver cirrhosis  2. Hepatic encephalopathy  3. Nonobstructive jaundicetotal bilirubin 15.8  4. Elevated LFTs  5. Acute hepatitis A  6. NICK  7. Portal hypertension  8. Ascitesmild  9. Coagulopathy  10. Pancytopenia  11. Hyponatremia  12. Hypocalcemia  13. Hypoalbuminemia  14. Afib on Eliquis  15. Chronic diastolic CHF  16. Hx CAD  17. HTN  18. Type II DM  19. Depression    GI consulted and following  Consider nephrology consult in the a.m. Paracentesis and labs ordered  Check CXR  Start lactulose twice daily  Start nadolol  Start folic acid, thiamine, MV  Continue home medsProtonix, Seroquel nightly  Hold home BP meds as blood pressure is low normal  Hold home amiodarone secondary to an AE of hepatotoxicity  Hold home Eliquis in the setting of coagulopathy  Follow-up ammonia, ionized calcium, enteric bacteria panel  Follow-up UA, urine culture, blood cultures  Follow-up CMP, mag, CBC  I's and O's, daily weight  PT, OT, nutrition      DVT prophylaxis: SCDs  Diet: Cardiac  Contact: Blake Davis (niece)    [phone]  Code Status: FULL   Disposition: Admit to telemetry; >2 nights       Discussed with patient at bedside about hospital admission and my plan of care, who understood and agreed with my plan of care. Ran Gillespie DO   2/12/2021       Dragon medical dictation software was used for portions of this report. Unintended errors may occur.

## 2021-02-12 NOTE — ED NOTES
Patient is currently resting on stretcher with no signs of distress. Patient in stable condition at this moment.

## 2021-02-12 NOTE — CONSULTS
[email or web address]  [phone]    GASTROENTEROLOGY CONSULT      Impression:   1. Decompensated cirrhosis related to alcohol use disorder   - MELD 34  2. Non-obstructive jaundice   - Tbili 15.2  3. Abnormal LFTs   - ; AST 1065; Alk phos 222  4. Acute viral hepatitis A (+ IgM)   5. Alcohol use disorder  6. Portal hypertension  7. Mild ascites  8. Asymptomatic gallstones  9. Umbilical hernia containing mesenteric fat  10. Periumbilical abdominal pain  11. Hepatic encephalopathy  12. At risk for malnutrition  13. Inflammatory colonic polyp (Dr Suri Centeno) 6/2014  14. Chronic thrombocytopenia without overt bleeding  15. Hyponatremia          Ct Abd Pelv Wo Cont    Result Date: 2/12/2021  1. Nodular hepatic contour suggesting underlying cirrhosis. Discrete mass or biliary dilation not appreciated. 2. Splenomegaly. Gastrohepatic varices. Recanalized umbilical vein. Mesenteric edema and abdominal and pelvic ascites. Findings consistent with portal hypertension. 3. No bowel distention to suggest obstruction. [id number]    Result Date: 2/12/2021  1. Findings consistent with hepatocellular disease. 2.  Mild ascites. 3.  Cholelithiasis but no distinct evidence to suggest acute cholecystitis. Plan:     1. Endoscopic evaluation for variceal evaluation is appropriate. EGD timing dependent on patient's hospital course. Will start non-selective BB (nadolol) to prevent variceal bleed. 2. Paracentesis with fluid study as ordered  3. Lactulose 30 mL twice daily. Can titrate to achieve 2-3 soft stools a day  4. Can consider baseline infectious work-up where appropriate  ascitic fluid cell counts with cultures, UA with culture, blood culture, CXR  5. Can consider nutritional services as at risk for malnutrition  6. NSAIDs/ASA/anticoagulant therapy not recommended given coagulopathy and decompensated liver. Tylenol up to 2000 gm a day is considered safe.    7. Outpatient in-person therapy recommended to address alcohol use disorder  8. Close outpatient GI follow-up is highly recommended  9. Medical management otherwise deferred to primary team    Will follow. Additional input depending on patient's clinical course. Chief Complaint: jaundice      HPI:  Tip Navarro is a 62 y.o. male who I am being asked to see in consultation for an opinion regarding above. Patient with chronic liver disease in setting of alcohol use disorder. He arrived to the hospital today at the suggestive of Dr Demetrio Alexander following abnormal lab studies and to get a paracentesis. Per patient he sees Dr Demetrio Alexander for his liver disorder. He had previously seen Dr Pablo Martinez once 1/2017, but had not returned in follow-up as recommended. Per patient, for the past few weeks, noticed that he was not feeling well and getting more jaundice. Notes that he continues to drink about 3-4 beers a day, but this is of less quantity than he used to drink. Notes that he would like therapy to stop drinking. He arrived at [facility] with findings suggestive of decompensated liver cirrhosis. Has periumbilic pain related to large hernia otherwise no sensation of abdominal swelling. Admits to some confusion, light colored stool and dark urine. Poor appetite. No having any chest pain, shortness of breath, fever or chills.          PMH:   Past Medical History:   Diagnosis Date    A-fib [facility])     Alcohol abuse 8/22/2013    CAD (coronary artery disease)     Non-obstructive LAD by cath (2016)    Depression     DMII (diabetes mellitus, type 2) ([facility] [address]     Hypertension     Substance abuse ([facility] [address]     Suicidal thoughts     Umbilical hernia        PSH:   Past Surgical History:   Procedure Laterality Date    HX BACK SURGERY  2014    HX CORONARY STENT PLACEMENT  2012    No stent placed       Social HX:   Social History     Socioeconomic History    Marital status: LEGALLY      Spouse name: Not on file    Number of children: Not on file    Years of education: Not on file    Highest education level: Not on file   Occupational History    Not on file   Social Needs    Financial resource strain: Not on file    Food insecurity     Worry: Not on file     Inability: Not on file    Transportation needs     Medical: Not on file     Non-medical: Not on file   Tobacco Use    Smoking status: Never Smoker    Smokeless tobacco: Never Used   Substance and Sexual Activity    Alcohol use: Yes     Alcohol/week: 63.0 standard drinks     Types: 63 Cans of beer per week    Drug use: No     Types: Benzodiazepines    Sexual activity: Not Currently   Lifestyle    Physical activity     Days per week: Not on file     Minutes per session: Not on file    Stress: Not on file   Relationships    Social connections     Talks on phone: Not on file     Gets together: Not on file     Attends Anabaptism service: Not on file     Active member of club or organization: Not on file     Attends meetings of clubs or organizations: Not on file     Relationship status: Not on file    Intimate partner violence     Fear of current or ex partner: Not on file     Emotionally abused: Not on file     Physically abused: Not on file     Forced sexual activity: Not on file   Other Topics Concern    Not on file   Social History Narrative     X 2. Lives with wife and 21year old son. 6year old son was taken away by .     Unemployed        [id number]:   Family History   Problem Relation Age of Onset    Suicide Father     Diabetes Brother     Psychiatric Disorder Other        Allergy:   Allergies   Allergen Reactions    Aleve [Naproxen Sodium] Itching and Hives    Naproxen Hives       Patient Active Problem List   Diagnosis Code    Anxiety and depression F41.9, F32.9    Alcohol abuse F10.10    H/O lumbosacral spine surgery Z98.890    Chronic low back pain with sciatica M54.40, G89.29    H/O coronary angioplasty Z98.61    Depression F32.9    Suicidal thoughts R45.851    Pancytopenia ([address] Q28.164    Essential hypertension with goal blood pressure less than 130/80 I10    Type 2 diabetes mellitus with diabetic polyneuropathy, without long-term current use of insulin (HCC) E11.42    CAD (coronary artery disease) I25.10    Tachycardia R00.0    Atrial fibrillation (HCC) I48.91    Mild concentric left ventricular hypertrophy (LVH) E21.1    Diastolic dysfunction H14.63    Tricuspid regurgitation I07.1    Mitral regurgitation I34.0    Alcohol dependence (HCC) F10.20    Family history of premature CAD Z82.49    Type 2 diabetes mellitus with diabetic neuropathy (HCC) E11.40    Ascites due to alcoholic hepatitis X73.48    Liver disease due to alcohol (HCC) K70.9    Liver cirrhosis (HCC) K74.60       Home Medications:     (Not in a hospital admission)      Review of Systems:     Constitutional: no fever, chills   Skin: + jaundice    HENT: No headache, nosebleeds    Eyes: + jaundice   Cardiovascular: No chest pain, heart palpitation   Respiratory: No cough, shortness of breath   Gastrointestinal: Umbilical pain   Genitourinary: No dysuria, bleeding   Musculoskeletal: + wasting   Endo: No sweats. Heme: No bruising   Allergies: As noted. Neurological: Alert and oriented. Gait not assessed. Psychiatric:  confusion          Visit Vitals  BP (!) 92/59 (BP 1 Location: Left upper arm, BP Patient Position: At rest)   Pulse 95   Temp 97.5 °F (36.4 °C)   Resp 18   SpO2 100%       Physical Assessment:     constitutional: looks older than stated age; no deformities, in no acute distress. skin: diffuse jaundice  eyes: jaundice  ENMT: deferred; wearing mask due to COVID-19 pandemic  neck: supple  respiratory: effort: normal chest excursion; no intercostal retraction or accessory muscle use. cardiovascular: normal rate  abdominal: distended; large reducible umbilical hernia noted. rectal: hemoccult/guaiac: not performed. musculoskeletal: no deformity or contracture.    neurologic: grossly intact by observation  psychiatric: judgement/insight: within normal limits. memory: mildly limited. mood and affect: no evidence of depression, anxiety or agitation. orientation: oriented to time, space and person. Basic Metabolic Profile   Recent Labs     02/12/21  1107   *   K 3.5   CL 89*   CO2 26   BUN 30*   *   CA 7.4*         CBC w/Diff    Recent Labs     02/12/21  1107   WBC 2.8*   RBC 4.04*   HGB 12.4*   HCT 34.3*   MCV 84.9   MCH 30.7   MCHC 36.2   RDW 16.4*   *    Recent Labs     02/12/21  1107   GRANS 72   LYMPH 15*   EOS 1        Hepatic Function   Recent Labs     02/12/21  1107   ALB 2.4*   TP 8.4*   TBILI 15.8*   *   LPSE 592*        Coags   Recent Labs     02/12/21  1107   PTP 20.2*   INR 1.8*   APTT 37.6*           ALIVIA Payan. [facility] [address] 1946, [address] [facility]  Cell: [phone]  [email or web address]

## 2021-02-13 LAB
ALBUMIN SERPL-MCNC: 2.3 G/DL (ref 3.4–5)
ALBUMIN/GLOB SERPL: 0.5 {RATIO} (ref 0.8–1.7)
ALP SERPL-CCNC: 173 U/L (ref 45–117)
ALT SERPL-CCNC: 518 U/L (ref 16–61)
AMMONIA PLAS-SCNC: 41 UMOL/L (ref 11–32)
ANION GAP SERPL CALC-SCNC: 7 MMOL/L (ref 3–18)
APPEARANCE UR: CLEAR
AST SERPL-CCNC: 767 U/L (ref 10–38)
BACTERIA URNS QL MICRO: ABNORMAL /HPF
BASOPHILS # BLD: 0 K/UL (ref 0–0.1)
BASOPHILS NFR BLD: 1 % (ref 0–2)
BILIRUB SERPL-MCNC: 14.9 MG/DL (ref 0.2–1)
BILIRUB UR QL: ABNORMAL
BUN SERPL-MCNC: 27 MG/DL (ref 7–18)
BUN/CREAT SERPL: 21 (ref 12–20)
CA-I SERPL-SCNC: 1.03 MMOL/L (ref 1.12–1.32)
CALCIUM SERPL-MCNC: 7.2 MG/DL (ref 8.5–10.1)
CHLORIDE SERPL-SCNC: 97 MMOL/L (ref 100–111)
CO2 SERPL-SCNC: 26 MMOL/L (ref 21–32)
COLOR UR: ABNORMAL
CREAT SERPL-MCNC: 1.27 MG/DL (ref 0.6–1.3)
DIFFERENTIAL METHOD BLD: ABNORMAL
EOSINOPHIL # BLD: 0 K/UL (ref 0–0.4)
EOSINOPHIL NFR BLD: 1 % (ref 0–5)
EPITH CASTS URNS QL MICRO: ABNORMAL /LPF (ref 0–5)
ERYTHROCYTE [DISTWIDTH] IN BLOOD BY AUTOMATED COUNT: 16.6 % (ref 11.6–14.5)
EST. AVERAGE GLUCOSE BLD GHB EST-MCNC: 137 MG/DL
GLOBULIN SER CALC-MCNC: 5 G/DL (ref 2–4)
GLUCOSE BLD STRIP.AUTO-MCNC: 136 MG/DL (ref 70–110)
GLUCOSE BLD STRIP.AUTO-MCNC: 141 MG/DL (ref 70–110)
GLUCOSE BLD STRIP.AUTO-MCNC: 294 MG/DL (ref 70–110)
GLUCOSE BLD STRIP.AUTO-MCNC: 55 MG/DL (ref 70–110)
GLUCOSE BLD STRIP.AUTO-MCNC: 80 MG/DL (ref 70–110)
GLUCOSE SERPL-MCNC: 55 MG/DL (ref 74–99)
GLUCOSE UR STRIP.AUTO-MCNC: NEGATIVE MG/DL
HBA1C MFR BLD: 6.4 % (ref 4.2–5.6)
HCT VFR BLD AUTO: 29.6 % (ref 36–48)
HGB BLD-MCNC: 11.1 G/DL (ref 13–16)
HGB UR QL STRIP: NEGATIVE
KETONES UR QL STRIP.AUTO: NEGATIVE MG/DL
LEUKOCYTE ESTERASE UR QL STRIP.AUTO: NEGATIVE
LYMPHOCYTES # BLD: 0.3 K/UL (ref 0.9–3.6)
LYMPHOCYTES NFR BLD: 11 % (ref 21–52)
MAGNESIUM SERPL-MCNC: 1.5 MG/DL (ref 1.6–2.6)
MCH RBC QN AUTO: 31.5 PG (ref 24–34)
MCHC RBC AUTO-ENTMCNC: 37.5 G/DL (ref 31–37)
MCV RBC AUTO: 84.1 FL (ref 74–97)
MONOCYTES # BLD: 0.4 K/UL (ref 0.05–1.2)
MONOCYTES NFR BLD: 13 % (ref 3–10)
NEUTS SEG # BLD: 2.3 K/UL (ref 1.8–8)
NEUTS SEG NFR BLD: 74 % (ref 40–73)
NITRITE UR QL STRIP.AUTO: NEGATIVE
PH UR STRIP: 7.5 [PH] (ref 5–8)
PLATELET # BLD AUTO: 90 K/UL (ref 135–420)
PMV BLD AUTO: 9.7 FL (ref 9.2–11.8)
POTASSIUM SERPL-SCNC: 3.1 MMOL/L (ref 3.5–5.5)
PROT SERPL-MCNC: 7.3 G/DL (ref 6.4–8.2)
PROT UR STRIP-MCNC: NEGATIVE MG/DL
RBC # BLD AUTO: 3.52 M/UL (ref 4.7–5.5)
RBC #/AREA URNS HPF: ABNORMAL /HPF (ref 0–5)
SODIUM SERPL-SCNC: 130 MMOL/L (ref 136–145)
SP GR UR REFRACTOMETRY: 1.01 (ref 1–1.03)
UROBILINOGEN UR QL STRIP.AUTO: 0.2 EU/DL (ref 0.2–1)
WBC # BLD AUTO: 3 K/UL (ref 4.6–13.2)
WBC URNS QL MICRO: ABNORMAL /HPF (ref 0–4)

## 2021-02-13 PROCEDURE — 83735 ASSAY OF MAGNESIUM: CPT

## 2021-02-13 PROCEDURE — 83036 HEMOGLOBIN GLYCOSYLATED A1C: CPT

## 2021-02-13 PROCEDURE — 87086 URINE CULTURE/COLONY COUNT: CPT

## 2021-02-13 PROCEDURE — 65660000000 HC RM CCU STEPDOWN

## 2021-02-13 PROCEDURE — 80053 COMPREHEN METABOLIC PANEL: CPT

## 2021-02-13 PROCEDURE — 82962 GLUCOSE BLOOD TEST: CPT

## 2021-02-13 PROCEDURE — 82107 ALPHA-FETOPROTEIN L3: CPT

## 2021-02-13 PROCEDURE — 97165 OT EVAL LOW COMPLEX 30 MIN: CPT

## 2021-02-13 PROCEDURE — 85025 COMPLETE CBC W/AUTO DIFF WBC: CPT

## 2021-02-13 PROCEDURE — 74011250636 HC RX REV CODE- 250/636

## 2021-02-13 PROCEDURE — 36415 COLL VENOUS BLD VENIPUNCTURE: CPT

## 2021-02-13 PROCEDURE — 74011250637 HC RX REV CODE- 250/637: Performed by: FAMILY MEDICINE

## 2021-02-13 PROCEDURE — 81001 URINALYSIS AUTO W/SCOPE: CPT

## 2021-02-13 PROCEDURE — 86709 HEPATITIS A IGM ANTIBODY: CPT

## 2021-02-13 PROCEDURE — 82330 ASSAY OF CALCIUM: CPT

## 2021-02-13 PROCEDURE — 99233 SBSQ HOSP IP/OBS HIGH 50: CPT | Performed by: INTERNAL MEDICINE

## 2021-02-13 PROCEDURE — 82140 ASSAY OF AMMONIA: CPT

## 2021-02-13 PROCEDURE — 74011636637 HC RX REV CODE- 636/637: Performed by: FAMILY MEDICINE

## 2021-02-13 PROCEDURE — 87389 HIV-1 AG W/HIV-1&-2 AB AG IA: CPT

## 2021-02-13 RX ORDER — LORAZEPAM 2 MG/ML
2 INJECTION INTRAMUSCULAR
Status: DISCONTINUED | OUTPATIENT
Start: 2021-02-13 | End: 2021-02-16

## 2021-02-13 RX ORDER — LORAZEPAM 2 MG/ML
INJECTION INTRAMUSCULAR
Status: COMPLETED
Start: 2021-02-13 | End: 2021-02-13

## 2021-02-13 RX ORDER — SODIUM CHLORIDE 0.9 % (FLUSH) 0.9 %
5-40 SYRINGE (ML) INJECTION EVERY 8 HOURS
Status: DISCONTINUED | OUTPATIENT
Start: 2021-02-13 | End: 2021-02-13

## 2021-02-13 RX ORDER — LORAZEPAM 1 MG/1
1 TABLET ORAL
Status: DISCONTINUED | OUTPATIENT
Start: 2021-02-13 | End: 2021-02-20 | Stop reason: HOSPADM

## 2021-02-13 RX ORDER — LORAZEPAM 1 MG/1
2 TABLET ORAL
Status: DISCONTINUED | OUTPATIENT
Start: 2021-02-13 | End: 2021-02-20 | Stop reason: HOSPADM

## 2021-02-13 RX ORDER — SODIUM CHLORIDE 0.9 % (FLUSH) 0.9 %
5-40 SYRINGE (ML) INJECTION AS NEEDED
Status: DISCONTINUED | OUTPATIENT
Start: 2021-02-13 | End: 2021-02-20 | Stop reason: HOSPADM

## 2021-02-13 RX ORDER — LORAZEPAM 2 MG/ML
1 INJECTION INTRAMUSCULAR
Status: DISCONTINUED | OUTPATIENT
Start: 2021-02-13 | End: 2021-02-16

## 2021-02-13 RX ORDER — MAGNESIUM SULFATE 100 %
4 CRYSTALS MISCELLANEOUS AS NEEDED
Status: DISCONTINUED | OUTPATIENT
Start: 2021-02-13 | End: 2021-02-20 | Stop reason: HOSPADM

## 2021-02-13 RX ORDER — LORAZEPAM 2 MG/ML
3 INJECTION INTRAMUSCULAR
Status: DISCONTINUED | OUTPATIENT
Start: 2021-02-13 | End: 2021-02-16

## 2021-02-13 RX ORDER — DEXTROSE 50 % IN WATER (D50W) INTRAVENOUS SYRINGE
25-50 AS NEEDED
Status: DISCONTINUED | OUTPATIENT
Start: 2021-02-13 | End: 2021-02-20 | Stop reason: HOSPADM

## 2021-02-13 RX ORDER — INSULIN LISPRO 100 [IU]/ML
INJECTION, SOLUTION INTRAVENOUS; SUBCUTANEOUS
Status: DISCONTINUED | OUTPATIENT
Start: 2021-02-13 | End: 2021-02-20 | Stop reason: HOSPADM

## 2021-02-13 RX ADMIN — LORAZEPAM 1 MG: 2 INJECTION INTRAMUSCULAR; INTRAVENOUS at 03:18

## 2021-02-13 RX ADMIN — FOLIC ACID 1 MG: 1 TABLET ORAL at 09:52

## 2021-02-13 RX ADMIN — QUETIAPINE FUMARATE 600 MG: 300 TABLET, EXTENDED RELEASE ORAL at 22:55

## 2021-02-13 RX ADMIN — Medication 10 ML: at 06:00

## 2021-02-13 RX ADMIN — THERA TABS 1 TABLET: TAB at 09:52

## 2021-02-13 RX ADMIN — Medication 600 MG: at 22:55

## 2021-02-13 RX ADMIN — INSULIN LISPRO 6 UNITS: 100 INJECTION, SOLUTION INTRAVENOUS; SUBCUTANEOUS at 12:33

## 2021-02-13 RX ADMIN — Medication 10 ML: at 22:56

## 2021-02-13 RX ADMIN — NADOLOL 20 MG: 40 TABLET ORAL at 09:52

## 2021-02-13 RX ADMIN — Medication 10 ML: at 16:40

## 2021-02-13 RX ADMIN — Medication 10 ML: at 06:11

## 2021-02-13 RX ADMIN — PANTOPRAZOLE SODIUM 40 MG: 40 TABLET, DELAYED RELEASE ORAL at 08:13

## 2021-02-13 NOTE — CONSULTS
Comprehensive Nutrition Assessment    Type and Reason for Visit: Initial, Positive nutrition screen, Consult    Nutrition Recommendations/Plan:   - Continue current diet order and Ensure Enlive TID  - Clarify HS snack in order.   - Continue MVI, thiamine, and folic acid. Nutrition Assessment:  Admitted with decompensated cirrhosis, jaundice, hepatitis A, and NICK. Plan for paracentesis. EGD pending hospital course. Electrolyte derangements noted and elevated LFTs. Hypoglycemic this am.  Good meal intake since admission. Endorses good appetite, which is a change from PTA. Consuming Ensure well. Encouraged the importance of eating a balanced diet. Malnutrition Assessment:  Malnutrition Status:  Severe malnutrition    Context:  Social/environmental circumstances     Findings of the 6 clinical characteristics of malnutrition:   Energy Intake:  7 - 50% or less est energy requirements for 1 month or longer  Weight Loss:  7.0 - Greater than 7.5% over 3 months     Body Fat Loss:  No significant body fat loss,     Muscle Mass Loss:  No significant muscle mass loss,    Fluid Accumulation:  (fluid accumulation related to other contributing factors),     Strength:  Not performed     Nutrition History and Allergies: PMH: alcohol abuse, CAD, depression, T2DM, HTN, liver disease. Presented with c/o jaundice and abdominal pain and distention. Reported decrease in alcohol consumption over the past 6 months, however reported 3-4 beers/day to GI and 6-12 beers/day to nursing; reported 3-4 beers per day 3-4 times a week to this writer. Reports poor meal intake PTA, sometimes not eating all day; states that is usually when he is consuming alcohol. Endorses wt fluctuations between 196-236 lbs. NKFA.      Estimated Daily Nutrient Needs:  Energy (kcal): 2062-9566; Weight Used for Energy Requirements: Current  Protein (g): ; Weight Used for Protein Requirements: Current(1.-1.2)  Fluid (ml/day): 7907-4532; Method Used for Fluid Requirements: 1 ml/kcal      Nutrition Related Findings:  Meds: thiamine, folic acid, MVI, lactulose. BM 2/12. Ascites. Wounds:    None       Current Nutrition Therapies:  DIET NUTRITIONAL SUPPLEMENTS All Meals; Ensure Enlive  DIET SNACKS HS Snack  DIET CARDIAC Regular    Anthropometric Measures:  · Height:  5' 10\" (177.8 cm)  · Current Body Wt:  88 kg (194 lb 0.1 oz)   · Admission Body Wt:  194 lb 0.1 oz    · Usual Body Wt:  99.8 kg (220 lb)     · Ideal Body Wt:  166 lbs:  116.9 %   · BMI Category: Overweight (BMI 25.0-29. 9)       Nutrition Diagnosis:   · Severe malnutrition, In context of social or environmental circumstances related to inadequate protein-energy intake as evidenced by poor intake prior to admission, weight loss 7.5% in 3 months(alcohol abuse)    Nutrition Interventions:   Food and/or Nutrient Delivery: Continue current diet, Continue oral nutrition supplement, Vitamin supplement, Mineral supplement  Nutrition Education and Counseling:    Coordination of Nutrition Care: Continue to monitor while inpatient    Goals:  PO nutrition intake will continue to meet >75% of patients estimated nutritional needs over the next 7 days.        Nutrition Monitoring and Evaluation:   Behavioral-Environmental Outcomes: Beliefs and attitudes, Readiness for change  Food/Nutrient Intake Outcomes: Food and nutrient intake, Supplement intake, Vitamin/mineral intake  Physical Signs/Symptoms Outcomes: Biochemical data, Meal time behavior, Fluid status or edema, Nutrition focused physical findings    Discharge Planning:    Continue current diet     Electronically signed by Joslyn Case RD on 2/13/2021 at 1:35 PM    Contact: [phone]

## 2021-02-13 NOTE — PROGRESS NOTES
Problem: Self Care Deficits Care Plan (Adult)  Goal: *Acute Goals and Plan of Care (Insert Text)  Outcome: Resolved/Met    OCCUPATIONAL THERAPY EVALUATION/DISCHARGE    Patient: Ginna Crain (38 y.o. male)  Date: 2/13/2021  Primary Diagnosis: Liver cirrhosis ([facility] [K74.60]        Precautions: none listed    PLOF: he reports he was independent even with his back pain. Additionally, he reports he will be moving because of a \"bad situation\" where he lives    ASSESSMENT AND RECOMMENDATIONS:  Based on the objective data described below, the patient presents with out functional deficits as he is modified independent with his self care routine. He does required additional time and encouragement to complete tasks (this is likely his baseline level of function); therefore,  skilled occupational therapy is not indicated at this time. Discharge Recommendations: None  Further Equipment Recommendations for Discharge: N/A      SUBJECTIVE:   Patient stated I can take care of myself.  and \"yes, I will show you\"    OBJECTIVE DATA SUMMARY:     Past Medical History:   Diagnosis Date    A-fib ([facility]     Alcohol abuse 8/22/2013    CAD (coronary artery disease)     Non-obstructive LAD by cath (2016)    Depression     DMII (diabetes mellitus, type 2) ([facility]     Hypertension     Substance abuse ([facility]     Suicidal thoughts     Umbilical hernia      Past Surgical History:   Procedure Laterality Date    HX BACK SURGERY  2014    HX CORONARY STENT PLACEMENT  2012    No stent placed     Barriers to Learning/Limitations: no    Home Situation:   Home Situation  Home Environment: Trailer/mobile home  # Steps to Enter: 5  One/Two Story Residence: One story  Living Alone: No  Support Systems: Family member(s)  Patient Expects to be Discharged to[de-identified] Trailer/mobile home  Current DME Used/Available at Home: CPAP  [x]     Right hand dominant   []     Left hand dominant    Cognitive/Behavioral Status:   He is alert, oriented x 3    Skin: no skin integrity issues noted during OT evaluation  Edema: no UE edema noted    Vision/Perceptual:      Tracking is [place]       Coordination: BUE   WFL (he is slowed at bseline)    Balance:   Independent standing for LB clothes management    Strength: BUE  4/5    Tone & Sensation: BUE  WFL   Range of Motion: BUE  AROM WFL   Functional Mobility and Transfers for ADLs:  Bed Mobility:   Supine to sit and return to supine is modified independent (additional time)   Transfers:   Modified independent standing and side stepping in preparation for transfers   ADL Assessment:  Self feeding: independent (simulated)  Grooming: Independent (simulated in standing)  UB bathing/dressing: independent (simulated)  LB bathing/dressing: modified independent (additional time and then no increase in back pain)   Pain:  Pain level pre-treatment: 5/10   Pain level post-treatment: 5/10   He reports that is his \"usual pain\"    Activity Tolerance:   No SOB; he reports generalized fatigue  Please refer to the flowsheet for vital signs taken during this treatment. After treatment:   []  Patient left in no apparent distress sitting up in chair  [x]  Patient left in no apparent distress in bed  [x]  Call bell left within reach  []  Nursing notified  []  Caregiver present  []  Bed alarm activated    COMMUNICATION/EDUCATION:   [x]      Role of Occupational Therapy in the acute care setting  []      Home safety education was provided and the patient/caregiver indicated understanding. []      Patient/family have participated as able and agree with findings and recommendations. []      Patient is unable to participate in plan of care at this time. Thank you for this referral.  Mariaa Hernandez, OTR/L  Time Calculation: 13 mins      Eval Complexity: History: LOW Complexity : Brief history review ;    Examination: LOW Complexity : 1-3 performance deficits relating to physical, cognitive , or psychosocial skils that result in activity limitations and / or participation restrictions ;    Decision Making:LOW Complexity : No comorbidities that affect functional and no verbal or physical assistance needed to complete eval tasks

## 2021-02-13 NOTE — PROGRESS NOTES
Admit Date: 2/12/2021  Date of Service: 2/13/2021    Reason for follow-up: decompensated cirrhosis      Assessment:         Decompensated cirrhosis with transaminitis:  MELD 24; GI following   - 2/12 CT abd: nodular hepatic contours consistent with cirrhosis, no discrete mass; splenomegaly, gastrohepatic varices, mesenteric edema with abdominal and pelvic ascites c/w portal hypertension   - 2/12 US liver: mild ascites, no masses, cholelithiasis without cholecyctitis  Hep A IgM positive-- acute viral hepatitis in addition to   Hyperbilirubinemia with non-obstructive jaundice:  15.8--> 14.9 today  Hepatic encephalopathy  Acute Hyponatremia  Hypokalemia  NICK  Pancytopenia: unchanged since 2018  Coagulopathy with elevated INR due to cirrhosis:  INR 1.8, PT 20.2  Dm type 2:  Hb A1c 6.4; on SSI and accuchecks  Hx ETOH abuse:  BAL upon admission <3  Abnormal UA:  With elevated bilirubin; UTI unlikely  A.fib:  Currently rate controlled; dx 8/2020 due to ETOH withdrawal; followed by Dr. Isabel Schultz; was on Eliquis and toprol  Non-obstructive CAD:  Cath in 2011   Plan:   Continue Naldolol, folic acid, lactulose, protonix, thiamine. Eliquis  to remain on hold for now  SSI with accuchecks  Replace potassium  Continue on tele to monitor due to ongoing electrolyte abnormalities  Trend CBC, CMP daily  F/u urine culture  Need Hep A IgG  Awaiting Hep C serologies, AFP-L3%  Paracentesis with diagnostics ordered 2/12- awaiting completion of procedure    Careful clinical monitoring for fulminant liver failure in setting of possible acute Hep A infection    Current Antibtiocs:   none    Lines:   Peripheral    Case discussed with:  [x]Patient  []Family  [x]Nursing  []Case Management  DVT Prophylaxis:  []Lovenox  []Hep SQ  [x]SCDs  []Coumadin   []On Heparin gtt    I have independently examined the patient and reviewed all lab studies and imgaing as well as review of nursing notes and physican notes from the past 24 hours. Alexia BULLOCK Elle Alvarado D.O. Pager [phone]      Allergies   Allergen Reactions    Aleve [Naproxen Sodium] Itching and Hives    Naproxen Hives           Subjective:      Pt seen and examined. Feeling better than when he did upon presentation. Speaking clearly. Ate all of his lunch. Denies any abdominal pain shortness of breath or chest pain. Has generalized weakness. Denies any diarrhea. I have asked the patient about close contacts with recent liver problems GI upset or diarrhea to further investigate possibility of hepatitis A. Patient states that he lives with a roommate who has not been ill however he has multiple animals in the house. He is not aware of any contacts with a recent diagnosis of hepatitis A nor of any contacts with a GI illness. Objective:        Visit Vitals  BP 91/62   Pulse 95   Temp 97.4 °F (36.3 °C)   Resp 18   Wt 88 kg (194 lb)   SpO2 97%   BMI 27.84 kg/m²     Temp (24hrs), Av.9 °F (36.6 °C), Min:97.4 °F (36.3 °C), Max:98.5 °F (36.9 °C)        General:   awake alert and oriented, chronically ill-appearing, jaundiced   Skin:   no rashes or skin lesions noted on limited exam, dry and warm, jaundiced, multiple tattoos. Several skin abrasions and bruising. HEENT:  No scleral icterus or pallor; oral mucosa moist, lips dry with poor dentition   Lymph Nodes:   not assessed today   Lungs:   non, labored; bilaterally clear to aspiration- no crackles wheezes rales or rhonchi   Heart:  RRR, s1 and s2; no murmurs rubs or gallops; 2+ edema, + pedal pulses   Abdomen:  soft, slightly protuberant, no fluid wave active bowel sounds, non-tender   Genitourinary:  No scrotal edema   Extremities:   average muscle tone; no contractures, no joint effusions   Neurologic:  No gross focal motor or sensory abnormalities; CN 2-12 intact; Follows commands. Psychiatric:   appropriate and interactive.        Labs: Results:   Chemistry Recent Labs     21  0221 21  1107   GLU 55* 255*   * 123* K 3.1* 3.5   CL 97* 89*   CO2 26 26   BUN 27* 30*   CREA 1.27 1.94*   CA 7.2* 7.4*   AGAP 7 8   BUCR 21* 15   * 222*   TP 7.3 8.4*   ALB 2.3* 2.4*   GLOB 5.0* 6.0*   AGRAT 0.5* 0.4*      CBC w/Diff Recent Labs     02/13/21  0221 02/12/21  1107   WBC 3.0* 2.8*   RBC 3.52* 4.04*   HGB 11.1* 12.4*   HCT 29.6* 34.3*   PLT 90* 104*   GRANS 74* 72   LYMPH 11* 15*   EOS 1 1        No results found for: SDES No results found for: CULT     Results     Procedure Component Value Units Date/Time    CULTURE, URINE [[id number]] Collected: 02/13/21 0352    Order Status: Completed Specimen: Urine from Clean catch Updated: 02/13/21 0445    CULTURE, BLOOD [[id number]]     Order Status: Sent Specimen: Blood     CULTURE, BLOOD [[id number]]     Order Status: Sent Specimen: Blood     ENTERIC BACTERIA PANEL, DNA [[id number]]     Order Status: Sent Specimen: Stool     CULTURE, BODY FLUID Usha Bull STAIN [[id number]]     Order Status: Sent Specimen: Ascitic Fluid           Imaging:     Ct Abd Pelv Wo Cont    Result Date: 2/12/2021  1. Nodular hepatic contour suggesting underlying cirrhosis. Discrete mass or biliary dilation not appreciated. 2. Splenomegaly. Gastrohepatic varices. Recanalized umbilical vein. Mesenteric edema and abdominal and pelvic ascites. Findings consistent with portal hypertension. 3. No bowel distention to suggest obstruction. [facility]    Result Date: 2/12/2021  1. Findings consistent with hepatocellular disease. 2.  Mild ascites. 3.  Cholelithiasis but no distinct evidence to suggest acute cholecystitis. Xr Chest Port    Result Date: 2/13/2021  No acute cardiopulmonary process.

## 2021-02-13 NOTE — PROGRESS NOTES
No spiritual assessment was completed, patient was sleeping. Please page  on call should you find this patient in spiritual distress.        [address] [facility], [facility] Nirali Lopez Certified [address] [facility]– [place]   [phone]

## 2021-02-13 NOTE — PROGRESS NOTES
WWW.Mir Vracha  [phone]    Gastroenterology follow up-Progress note    Impression:    1. Decompensated cirrhosis    -related to alcohol use disorder; suspect there is a component of superadded acute hepatitis A              - MELD 34 on admission, down to 29 today.   -Can consider discussion with [facility] for decompensated cirrhosis, though overall, I feel that he would be a poor candidate for liver transplant evaluation as he seems to have poor insight/understanding into his problems and continues to drink alcohol.    -normally follows with Dr. Carol Goldman  2. Non-obstructive jaundice due to decompensated cirrhosis              - Tbili 15.2  3. Abnormal LFTs              - ; AST 1065; Alk phos 222-this is mainly due to acute hep A. This is somewhat better today  4. Acute viral hepatitis A (+ IgM)   5. Alcohol use disorder   -states he has cut back on ETOH and \"only drinks here and there\" On questioning further, it appears that he drinks at least 2-3 drinks daily   -does not seem to understand the importance of stopping all alcohol exposure given cirrhosis. 6. Portal hypertension   -unclear if he has had a recent EGD. Given decompensation, will need updated variceal screen-can be done electively. -started on Nadolol-continue for now. 7. Mild/moderate ascites   -low sodium diet   -unable to start diuretics due to NICK-can start when NICK improves.    -avoid NSAID's   -Recommend diagnostic and therapeutic paracentesis  8. Asymptomatic gallstones  9. Umbilical hernia containing mesenteric fat  10. Periumbilical abdominal pain  11. Hepatic encephalopathy   -does not appear to have significant encephalopathy at this time   -ok to use Lactulose prn   -consider using Xifaxan to prevent further admissions if this becomes active  12. At risk for malnutrition/sarcopenia   -start bedtime snack  13. Inflammatory colonic polyp (Dr Princess Dumont) 6/2014  14. Chronic thrombocytopenia without overt bleeding  15. Hyponatremia   -poor prognostic sign in Decompensated cirrhosis  16. CAD-Acute coronary syndrome 2016       Plan:  1. Continue best supportive care. 2. Consider discussion with [facility] for potential transfer given decompensated liver disease [phone]. Given his age, I would recommend at least discussing the transfer with the [facility] team.     Chief Complaint: f/u for cirrhosis    Subjective:  Denies any pain/bleeding    ROS: Denies any fevers, chills, rash.      Eyes: conjunctiva icteric, EOM normal   Neck: ROM normal, supple and trachea normal   Cardiovascular: heart normal, intact distal pulses, normal rate and regular rhythm   Pulmonary/Chest Wall: breath sounds normal and effort normal   Abdominal: appearance distended, bowel sounds normal and soft, non-acute, non-tender     Patient Active Problem List   Diagnosis Code    Anxiety and depression F41.9, F32.9    Alcohol abuse F10.10    H/O lumbosacral spine surgery Z98.890    Chronic low back pain with sciatica M54.40, G89.29    H/O coronary angioplasty Z98.61    Depression F32.9    Suicidal thoughts R45.851    Pancytopenia ([address] O04.979    Essential hypertension with goal blood pressure less than 130/80 I10    Type 2 diabetes mellitus with diabetic polyneuropathy, without long-term current use of insulin (HCC) E11.42    CAD (coronary artery disease) I25.10    Tachycardia R00.0    Atrial fibrillation (HCC) I48.91    Mild concentric left ventricular hypertrophy (LVH) S03.6    Diastolic dysfunction N34.76    Tricuspid regurgitation I07.1    Mitral regurgitation I34.0    Alcohol dependence (HCC) F10.20    Family history of premature CAD Z82.49    Type 2 diabetes mellitus with diabetic neuropathy (HCC) E11.40    Ascites due to alcoholic hepatitis B23.09    Liver disease due to alcohol (HCC) K70.9    Liver cirrhosis (HCC) K74.60         Visit Vitals  BP 91/62   Pulse 95   Temp 97.4 °F (36.3 °C)   Resp 18   Wt 88 kg (194 lb)   SpO2 97%   BMI 27.84 kg/m²         No intake or output data in the 24 hours ending 02/13/21 1022    CBC w/Diff    Lab Results   Component Value Date/Time    WBC 3.0 (L) 02/13/2021 02:21 AM    RBC 3.52 (L) 02/13/2021 02:21 AM    HGB 11.1 (L) 02/13/2021 02:21 AM    HCT 29.6 (L) 02/13/2021 02:21 AM    MCV 84.1 02/13/2021 02:21 AM    MCH 31.5 02/13/2021 02:21 AM    MCHC 37.5 (H) 02/13/2021 02:21 AM    RDW 16.6 (H) 02/13/2021 02:21 AM    PLT 90 (L) 02/13/2021 02:21 AM    Lab Results   Component Value Date/Time    GRANS 74 (H) 02/13/2021 02:21 AM    LYMPH 11 (L) 02/13/2021 02:21 AM    EOS 1 02/13/2021 02:21 AM    BASOS 1 02/13/2021 02:21 AM      Basic Metabolic Profile   Recent Labs     02/13/21  0221   *   K 3.1*   CL 97*   CO2 26   BUN 27*   CA 7.2*   MG 1.5*        Hepatic Function    Lab Results   Component Value Date/Time    ALB 2.3 (L) 02/13/2021 02:21 AM    TP 7.3 02/13/2021 02:21 AM     (H) 02/13/2021 02:21 AM    No results found for: TBIL       Coags   Recent Labs     02/12/21  1107   PTP 20.2*   INR 1.8*   APTT 37.6*               Selina Santamaria MD    [facility]. [employer]  Phone: [phone]  Pager: [phone]

## 2021-02-13 NOTE — PROGRESS NOTES
Problem: Risk for Spread of Infection  Goal: Prevent transmission of infectious organism to others  Description: Prevent the transmission of infectious organisms to other patients, staff members, and visitors. Outcome: Progressing Towards Goal     Problem: Falls - Risk of  Goal: *Absence of Falls  Description: Document Vu Siddiqi Fall Risk and appropriate interventions in the flowsheet.   Outcome: Progressing Towards Goal  Note: Fall Risk Interventions:            Medication Interventions: Assess postural VS orthostatic hypotension, Bed/chair exit alarm, Evaluate medications/consider consulting pharmacy, Patient to call before getting OOB, Teach patient to arise slowly                   Problem: Alcohol Withdrawal  Goal: *STG: Remains safe in hospital  Outcome: Progressing Towards Goal     Problem: Alcohol Withdrawal  Goal: *STG: Complies with medication therapy  Outcome: Progressing Towards Goal     Problem: Alcohol Withdrawal  Goal: *STG: Will identify negative impact of chemical dependency including the use of tobacco, alcohol, and other substances  Outcome: Progressing Towards Goal     Problem: Alcohol Withdrawal  Goal: *STG: Verbalizes abstinence as an achievable goal  Outcome: Progressing Towards Goal

## 2021-02-13 NOTE — ROUTINE PROCESS
During patient admission interview, patient was asked about current alcohol use. He states sometimes is a 12 pack a day or 6 a day. Patient was not placed on CIWA protocol, provider was notified and patient is now on [place]arrow.

## 2021-02-14 LAB
ALBUMIN SERPL-MCNC: 2.2 G/DL (ref 3.4–5)
ALBUMIN/GLOB SERPL: 0.4 {RATIO} (ref 0.8–1.7)
ALP SERPL-CCNC: 179 U/L (ref 45–117)
ALT SERPL-CCNC: 507 U/L (ref 16–61)
AMMONIA PLAS-SCNC: 97 UMOL/L (ref 11–32)
ANION GAP SERPL CALC-SCNC: 7 MMOL/L (ref 3–18)
AST SERPL-CCNC: 784 U/L (ref 10–38)
BACTERIA SPEC CULT: ABNORMAL
BILIRUB SERPL-MCNC: 16.8 MG/DL (ref 0.2–1)
BUN SERPL-MCNC: 23 MG/DL (ref 7–18)
BUN/CREAT SERPL: 19 (ref 12–20)
CALCIUM SERPL-MCNC: 8.1 MG/DL (ref 8.5–10.1)
CC UR VC: ABNORMAL
CHLORIDE SERPL-SCNC: 97 MMOL/L (ref 100–111)
CO2 SERPL-SCNC: 26 MMOL/L (ref 21–32)
CREAT SERPL-MCNC: 1.22 MG/DL (ref 0.6–1.3)
ERYTHROCYTE [DISTWIDTH] IN BLOOD BY AUTOMATED COUNT: 17.2 % (ref 11.6–14.5)
GLOBULIN SER CALC-MCNC: 5.4 G/DL (ref 2–4)
GLUCOSE BLD STRIP.AUTO-MCNC: 101 MG/DL (ref 70–110)
GLUCOSE BLD STRIP.AUTO-MCNC: 144 MG/DL (ref 70–110)
GLUCOSE BLD STRIP.AUTO-MCNC: 184 MG/DL (ref 70–110)
GLUCOSE BLD STRIP.AUTO-MCNC: 96 MG/DL (ref 70–110)
GLUCOSE SERPL-MCNC: 88 MG/DL (ref 74–99)
HCT VFR BLD AUTO: 32 % (ref 36–48)
HGB BLD-MCNC: 11.6 G/DL (ref 13–16)
MCH RBC QN AUTO: 31 PG (ref 24–34)
MCHC RBC AUTO-ENTMCNC: 36.3 G/DL (ref 31–37)
MCV RBC AUTO: 85.6 FL (ref 74–97)
PLATELET # BLD AUTO: 103 K/UL (ref 135–420)
PMV BLD AUTO: 10.5 FL (ref 9.2–11.8)
POTASSIUM SERPL-SCNC: 3.6 MMOL/L (ref 3.5–5.5)
PROT SERPL-MCNC: 7.6 G/DL (ref 6.4–8.2)
RBC # BLD AUTO: 3.74 M/UL (ref 4.7–5.5)
SERVICE CMNT-IMP: ABNORMAL
SODIUM SERPL-SCNC: 130 MMOL/L (ref 136–145)
WBC # BLD AUTO: 2.8 K/UL (ref 4.6–13.2)

## 2021-02-14 PROCEDURE — 82962 GLUCOSE BLOOD TEST: CPT

## 2021-02-14 PROCEDURE — 85027 COMPLETE CBC AUTOMATED: CPT

## 2021-02-14 PROCEDURE — 82140 ASSAY OF AMMONIA: CPT

## 2021-02-14 PROCEDURE — 36415 COLL VENOUS BLD VENIPUNCTURE: CPT

## 2021-02-14 PROCEDURE — 65660000000 HC RM CCU STEPDOWN

## 2021-02-14 PROCEDURE — 97162 PT EVAL MOD COMPLEX 30 MIN: CPT

## 2021-02-14 PROCEDURE — 74011250637 HC RX REV CODE- 250/637: Performed by: FAMILY MEDICINE

## 2021-02-14 PROCEDURE — 74011636637 HC RX REV CODE- 636/637: Performed by: FAMILY MEDICINE

## 2021-02-14 PROCEDURE — 80053 COMPREHEN METABOLIC PANEL: CPT

## 2021-02-14 PROCEDURE — 99233 SBSQ HOSP IP/OBS HIGH 50: CPT | Performed by: INTERNAL MEDICINE

## 2021-02-14 RX ADMIN — LACTULOSE 30 ML: 20 SOLUTION ORAL at 17:18

## 2021-02-14 RX ADMIN — INSULIN LISPRO 2 UNITS: 100 INJECTION, SOLUTION INTRAVENOUS; SUBCUTANEOUS at 12:22

## 2021-02-14 RX ADMIN — Medication 10 ML: at 16:43

## 2021-02-14 RX ADMIN — PANTOPRAZOLE SODIUM 40 MG: 40 TABLET, DELAYED RELEASE ORAL at 06:52

## 2021-02-14 RX ADMIN — LACTULOSE 30 ML: 20 SOLUTION ORAL at 08:15

## 2021-02-14 RX ADMIN — THERA TABS 1 TABLET: TAB at 09:00

## 2021-02-14 RX ADMIN — FOLIC ACID 1 MG: 1 TABLET ORAL at 08:23

## 2021-02-14 RX ADMIN — QUETIAPINE FUMARATE 600 MG: 300 TABLET, EXTENDED RELEASE ORAL at 21:57

## 2021-02-14 RX ADMIN — NADOLOL 20 MG: 40 TABLET ORAL at 08:23

## 2021-02-14 RX ADMIN — Medication 600 MG: at 21:58

## 2021-02-14 RX ADMIN — Medication 10 ML: at 05:43

## 2021-02-14 NOTE — ROUTINE PROCESS
Bedside and Verbal shift change report given to Ron Lopez (oncoming nurse) by Rufina Finley RN (offgoing nurse). Report included the following information SBAR, Kardex, Intake/Output, MAR and Recent Results.

## 2021-02-14 NOTE — PROGRESS NOTES
Problem: Mobility Impaired (Adult and Pediatric)  Goal: *Acute Goals and Plan of Care (Insert Text)  Description: Physical Therapy Goals  Initiated 2/14/2021 and to be accomplished within 7 day(s)  1. Patient will move from supine to sit and sit to supine , scoot up and down, and roll side to side in bed with modified independence. 2.  Patient will transfer from bed to chair and chair to bed with modified independence using the least restrictive device. 3.  Patient will perform sit to stand with modified independence. 4.  Patient will ambulate with modified independence for 150 feet with the least restrictive device. 5.  Patient will ascend/descend 0-5 stairs or marching in place with 1-2 handrail(s) with supervision/set-up. Prior Level of Function:   Patient was independence for all mobility including gait using no AD. Patient lives alone in a single story home. Outcome: Progressing Towards Goal   PHYSICAL THERAPY EVALUATION    Patient: Martin Milan (77 y.o. male)  Date: 2/14/2021  Primary Diagnosis: [facility] ([facility] [K74.60]        Precautions:   Fall    ASSESSMENT :  PT orders received and patient cleared by nursing to participate with therapy. Patient is a 62 y.o. male admitted to the hospital due to jaundice and abdominal pain. Patient consents to PT evaluation and treatment. Pt is oriented x4. Pt is annoyed with being asked multiple questions. Pt performed supine to sit supervision. Sit to stands contact guard assistance/standby assistance. Gait in room 15 feet with rolling walker contact guard assistance/standby assistance. Pt has narrow MIQUEL, decreased step length, and shuffling gait. Pt did ambulate some without walker but safer currently with RW. Pt ended therapy sitting edge of bed with lunch and all needs met. Alarm donned. Patient will benefit from skilled intervention to address the above impairments.   Patient's rehabilitation potential is considered to be Fair  Factors which may influence rehabilitation potential include:    []         None noted  []         Mental ability/status  [x]         Medical condition  []         Home/family situation and support systems  [x]         Safety awareness  []         Pain tolerance/management  []         Other:      PLAN :  Recommendations and Planned Interventions:    [x]           Bed Mobility Training             [x]    Neuromuscular Re-Education  [x]           Transfer Training                   []    Orthotic/Prosthetic Training  [x]           Gait Training                          [x]    Modalities  [x]           Therapeutic Exercises           [x]    Edema Management/Control  [x]           Therapeutic Activities            [x]    Family Training/Education  [x]           Patient Education  []           Other (comment):    Frequency/Duration: Patient will be followed by physical therapy 1-2 times per day/4-7 days per week to address goals. Discharge Recommendations: Home Health  Further Equipment Recommendations for Discharge: rolling walker     SUBJECTIVE:   Patient stated I'm tired of people asking me the same questions and having me get up.     OBJECTIVE DATA SUMMARY:     Past Medical History:   Diagnosis Date    [facility]     Alcohol abuse 8/22/2013    CAD (coronary artery disease)     Non-obstructive LAD by cath (2016)    Depression     DMII (diabetes mellitus, type 2) ([facility]     Hypertension     Substance abuse ([facility]     Suicidal thoughts     Umbilical hernia      Past Surgical History:   Procedure Laterality Date    HX BACK SURGERY  2014    HX CORONARY STENT PLACEMENT  2012    No stent placed     Barriers to Learning/Limitations: None  Compensate with: Visual Cues, Verbal Cues, and Tactile Cues  Home Situation:  Home Situation  Home Environment: Trailer/mobile home  # Steps to Enter: 5  One/Two Story Residence: One story  Living Alone: No  Support Systems: Family member(s)  Patient Expects to be Discharged to[de-identified] Trailer/mobile home  Current DME Used/Available at Home: CPAP  Critical Behavior:  Neurologic State: Alert  Orientation Level: Oriented X4  Cognition: Follows commands;Decreased attention/concentration  Safety/Judgement: Fall prevention  Psychosocial  Patient Behaviors: Cooperative  Purposeful Interaction: Yes  Pt Identified Daily Priority: Clinical issues (comment)  Caritas Process: Create healing environment  Caring Interventions: Reassure  Reassure: Quiet presence  Therapeutic Modalities: Humor  Skin Condition/Temp: Warm     Skin Integrity: Intact  Skin Integumentary  Skin Color: Jaundiced  Skin Condition/Temp: Warm  Skin Integrity: Intact  Turgor: Non-tenting  Hair Growth: Present  Varicosities: Absent  Nails: Exceptions to WDL  Exceptions to WDL: Thick     B LE Strength:    Strength: Generally decreased, functional              B LE Tone & Sensation:   Tone: Normal          Sensation: Intact           B LE Range Of Motion:  AROM: Within functional limits                 Posture:  Posture (WDL): Exceptions to WDL  Posture Assessment: Forward head  Functional Mobility:  Bed Mobility:     Supine to Sit: Supervision     Scooting: Supervision  Transfers:  Sit to Stand: Contact guard assistance;Stand-by assistance   Stand to Sit: Stand-by assistance                       Balance:   Sitting: Intact  Standing: Impaired; Without support  Standing - Static: Good;Fair  Standing - Dynamic : Fair    Ambulation/Gait Training:  Distance (ft): 15 Feet (ft)  Assistive Device: Walker, rolling  Ambulation - Level of Assistance: Contact guard assistance;Stand-by assistance     Gait Abnormalities: Decreased step clearance        Base of Support: Center of gravity altered     Speed/Ning: Slow;Shuffled  Step Length: Right shortened;Left shortened       Therapeutic Exercises:   Reviewed and performed ankle pumps to increase blood flow and circulation. Pain:  No pain noted before, during, or at end of session.       Activity Tolerance:   fair  Please refer to the flowsheet for vital signs taken during this treatment. After treatment:   []         Patient left in no apparent distress sitting up in chair  [x]         Patient left in no apparent distress in bed, sitting  [x]         Call bell left within reach  [x]   Personal items in reach   [x]         Nursing notified Vaishnavi Pickett  []         Caregiver present  [x]         Bed/chair alarm activated  []         SCDs applied     COMMUNICATION/EDUCATION:   [x]         Role of Physical Therapy in the acute care setting. [x]         Fall prevention education was provided and the patient/caregiver indicated understanding. [x]         Patient/family have participated as able in goal setting and plan of care. [x]         Patient/family agree to work toward stated goals and plan of care. []         Patient understands intent and goals of therapy, but is neutral about his/her participation. []         Patient is unable to participate in goal setting/plan of care: ongoing with therapy staff. [x]         Out of bed with nursing assistance 3-5 times a day. []         Other:     Thank you for this referral.  Teresa Meyer, PT, DPT   Time Calculation: 14 mins      Eval Complexity: History: MEDIUM  Complexity : 1-2 comorbidities / personal factors will impact the outcome/ POC Exam:HIGH Complexity : 4+ Standardized tests and measures addressing body structure, function, activity limitation and / or participation in recreation  Presentation: MEDIUM Complexity : Evolving with changing characteristics  Clinical Decision Making:Medium Complexity    Overall Complexity:MEDIUM

## 2021-02-14 NOTE — PROGRESS NOTES
Problem: Falls - Risk of  Goal: *Absence of Falls  Description: Document Scout Mo Fall Risk and appropriate interventions in the flowsheet.   Outcome: Progressing Towards Goal  Note: Fall Risk Interventions:     Medication Interventions: Bed/chair exit alarm, Patient to call before getting OOB    Elimination Interventions: Call light in reach, Bed/chair exit alarm, Patient to call for help with toileting needs

## 2021-02-14 NOTE — PROGRESS NOTES
[email or web address]  [phone]    Gastroenterology follow up-Progress note    Impression:    1. Decompensated cirrhosis    -related to alcohol use disorder; suspect there is a component of superadded acute hepatitis A infection              - MELD 34 on admission, down to 29 today.   -Can consider discussion with [facility] for decompensated cirrhosis (especially if the numbers get worse or if he develops significant encephalopathy, though overall, I feel that he would be a poor candidate for liver transplant evaluation as he seems to have poor insight/understanding into his problems and continues to drink alcohol.    -normally follows with Dr. Demetrio Alexander, but has also seen Dr. Pablo Martinez in the past.  2. Non-obstructive jaundice due to decompensated cirrhosis              - Tbili 16.8 today  3. Abnormal LFTs              - ; AST 1065; Alk phos 222-this is mainly due to acute hep A. This is somewhat better today  4. Acute viral hepatitis A (+ IgM)   5. Alcohol use disorder   -states he has cut back on ETOH and \"only drinks here and there\" On questioning further, it appears that he drinks at least 2-3 drinks daily   -does not seem to understand the importance of stopping all alcohol exposure given cirrhosis. 6. Portal hypertension   -unclear if he has had a recent EGD. Given decompensation, will need updated variceal screen-can be done electively. -started on Nadolol-continue for now. 7. Mild/moderate ascites   -low sodium diet   -unable to start diuretics due to NICK-can start when NICK improves.    -avoid NSAID's   -Recommend diagnostic and therapeutic paracentesis  8. Asymptomatic gallstones  9. Umbilical hernia containing mesenteric fat  10. Periumbilical abdominal pain  11. Hepatic encephalopathy   -does not appear to have significant encephalopathy at this time   -ok to use Lactulose prn   -consider using Xifaxan to prevent further admissions if this becomes active  12.  At risk for malnutrition/sarcopenia   -start bedtime snack  13. Inflammatory colonic polyp (Dr Ismael Smith) 6/2014  14. Chronic thrombocytopenia without overt bleeding  15. Hyponatremia   -poor prognostic sign in Decompensated cirrhosis  16. CAD-Acute coronary syndrome 2016       Plan:  1. Continue best supportive care. 2. Agree with paracentesis as ordered. 3. Diuretics when creatinine improves. 4. Consider discussion with [facility] for potential transfer given decompensated liver disease [phone]. Given his age, I would recommend at least discussing the transfer with the [facility] team, especially if he decompensates further (worsening labs, encephalopathy)    Chief Complaint: f/u for cirrhosis    Subjective:  Denies any pain/bleeding    ROS: Denies any fevers, chills, rash.      Eyes: conjunctiva icteric, EOM normal   Neck: ROM normal, supple and trachea normal   Cardiovascular: heart normal, intact distal pulses, normal rate and regular rhythm   Pulmonary/Chest Wall: breath sounds normal and effort normal   Abdominal: appearance distended, bowel sounds normal and soft, non-acute, non-tender     Patient Active Problem List   Diagnosis Code    Anxiety and depression F41.9, F32.9    Alcohol abuse F10.10    H/O lumbosacral spine surgery Z98.890    Chronic low back pain with sciatica M54.40, G89.29    H/O coronary angioplasty Z98.61    Depression F32.9    Suicidal thoughts R45.851    Pancytopenia ([facility] P69.438    Essential hypertension with goal blood pressure less than 130/80 I10    Type 2 diabetes mellitus with diabetic polyneuropathy, without long-term current use of insulin (HCC) E11.42    CAD (coronary artery disease) I25.10    Tachycardia R00.0    Atrial fibrillation (HCC) I48.91    Mild concentric left ventricular hypertrophy (LVH) U77.0    Diastolic dysfunction C79.05    Tricuspid regurgitation I07.1    Mitral regurgitation I34.0    Alcohol dependence (HCC) F10.20    Family history of premature CAD Z82.49   • Type 2 diabetes mellitus with diabetic neuropathy (HCC) E11.40   • Ascites due to alcoholic hepatitis K70.11   • Liver disease due to alcohol (HCC) K70.9   • Liver cirrhosis (HCC) K74.60         Visit Vitals  BP 99/74 (BP 1 Location: Right upper arm, BP Patient Position: Sitting)   Pulse 74   Temp 98.4 °F (36.9 °C)   Resp 18   Ht 5' 10\" (1.778 m)   Wt 88 kg (194 lb)   SpO2 95%   BMI 27.84 kg/m²           Intake/Output Summary (Last 24 hours) at 2/14/2021 1252  Last data filed at 2/14/2021 0544  Gross per 24 hour   Intake 600 ml   Output 500 ml   Net 100 ml       CBC w/Diff    Lab Results   Component Value Date/Time    WBC 2.8 (L) 02/14/2021 01:50 AM    RBC 3.74 (L) 02/14/2021 01:50 AM    HGB 11.6 (L) 02/14/2021 01:50 AM    HCT 32.0 (L) 02/14/2021 01:50 AM    MCV 85.6 02/14/2021 01:50 AM    MCH 31.0 02/14/2021 01:50 AM    MCHC 36.3 02/14/2021 01:50 AM    RDW 17.2 (H) 02/14/2021 01:50 AM     (L) 02/14/2021 01:50 AM    Lab Results   Component Value Date/Time    GRANS 74 (H) 02/13/2021 02:21 AM    LYMPH 11 (L) 02/13/2021 02:21 AM    EOS 1 02/13/2021 02:21 AM    BASOS 1 02/13/2021 02:21 AM      Basic Metabolic Profile   Recent Labs     02/14/21  0150 02/13/21  0221   * 130*   K 3.6 3.1*   CL 97* 97*   CO2 26 26   BUN 23* 27*   CA 8.1* 7.2*   MG  --  1.5*        Hepatic Function    Lab Results   Component Value Date/Time    ALB 2.2 (L) 02/14/2021 01:50 AM    TP 7.6 02/14/2021 01:50 AM     (H) 02/14/2021 01:50 AM    No results found for: TBIL       Coags   Recent Labs     02/12/21  1107   PTP 20.2*   INR 1.8*   APTT 37.6*               Cyril Souza MD    [facility].  [email or web address]  Phone: [phone]  Pager: [phone]

## 2021-02-14 NOTE — PROGRESS NOTES
Admit Date: 2/12/2021  Date of Service: 2/14/2021    Reason for follow-up: decompensated cirrhosis      Assessment:         Decompensated cirrhosis with transaminitis:  MELD 24; GI following   - 2/12 CT abd: nodular hepatic contours consistent with cirrhosis, no discrete mass; splenomegaly, gastrohepatic varices, mesenteric edema with abdominal and pelvic ascites c/w portal hypertension   - 2/12 US liver: mild ascites, no masses, cholelithiasis without cholecyctitis  Acute Hep A Hep A IgM positive--   Hyperbilirubinemia with non-obstructive jaundice:  15.8--> 14.9 today  Hepatic encephalopathy:  Ammonia 97  Acute Hyponatremia  Hypokalemia  NICK  Pancytopenia: unchanged since 2018  Coagulopathy with elevated INR due to cirrhosis:  INR 1.8, PT 20.2  Dm type 2:  Hb A1c 6.4; on SSI and accuchecks  Hx ETOH abuse:  BAL upon admission <3  Abnormal UA:  With elevated bilirubin; UTI unlikely  A.fib:  Currently rate controlled; dx 8/2020 due to ETOH withdrawal; followed by Dr. Concetta Finley; was on Eliquis and toprol  Non-obstructive CAD:  Cath in 2011   Plan:   Continue Naldolol, folic acid, lactulose, protonix, thiamine. Eliquis  to remain on hold for now  SSI with accuchecks  Replace potassium  Continue on tele to monitor due to ongoing electrolyte abnormalities  Trend CBC, CMP daily  F/u urine culture  Awaiting Hep C serologies, AFP-L3%, HIV  Paracentesis with diagnostics ordered 2/12- awaiting completion of procedure    Careful clinical monitoring for fulminant liver failure in setting of possible acute Hep A infection    Called and left message at [facility] liver clinic as advised by GI. Discussed with Dr. Solitario Chase this afternoon on rounds.      Current Antibtiocs:   none    Lines:   Peripheral    Case discussed with:  [x]Patient  []Family  [x]Nursing  []Case Management  DVT Prophylaxis:  []Lovenox  []Hep SQ  [x]SCDs  []Coumadin   []On Heparin gtt    I have independently examined the patient and reviewed all lab studies and imgaing as well as review of nursing notes and physican notes from the past 24 hours. Naveen Thorpe D.O. Pager [phone]      Allergies   Allergen Reactions    Aleve [Naproxen Sodium] Itching and Hives    Naproxen Hives           Subjective:      Pt seen and examined. Sleepy. Less talkative today. No abdominal pain or fevers. Did not take lactulose yesterday as prescribed. No other complaints. Objective:        Visit Vitals  /65   Pulse 77   Temp 97.5 °F (36.4 °C)   Resp 17   Ht 5' 10\" (1.778 m)   Wt 88 kg (194 lb)   SpO2 99%   BMI 27.84 kg/m²     Temp (24hrs), Av.1 °F (36.7 °C), Min:97.5 °F (36.4 °C), Max:98.7 °F (37.1 °C)        General:   awake alert and oriented, chronically ill-appearing, jaundiced   Skin:   no rashes or skin lesions noted on limited exam, dry and warm, jaundiced, multiple tattoos. Several skin abrasions and bruising. HEENT:  No scleral icterus or pallor; oral mucosa moist, lips dry with poor dentition   Lymph Nodes:   not assessed today   Lungs:   non, labored; bilaterally clear to aspiration- no crackles wheezes rales or rhonchi   Heart:  RRR, s1 and s2; no murmurs rubs or gallops; 2+ edema, + pedal pulses   Abdomen:  soft, slightly protuberant, no fluid wave active bowel sounds, non-tender   Genitourinary:  No scrotal edema   Extremities:   average muscle tone; no contractures, no joint effusions   Neurologic:  No gross focal motor or sensory abnormalities; CN 2-12 intact; Follows commands. Psychiatric:   appropriate and interactive.        Labs: Results:   Chemistry Recent Labs     21  0150 21  0221 21  1107   GLU 88 55* 255*   * 130* 123*   K 3.6 3.1* 3.5   CL 97* 97* 89*   CO2 26 26 26   BUN 23* 27* 30*   CREA 1.22 1.27 1.94*   CA 8.1* 7.2* 7.4*   AGAP 7 7 8   BUCR 19 21* 15   * 173* 222*   TP 7.6 7.3 8.4*   ALB 2.2* 2.3* 2.4*   GLOB 5.4* 5.0* 6.0*   AGRAT 0.4* 0.5* 0.4*      CBC w/Diff Recent Labs     21  01521  0224 02/12/21  1107   WBC 2.8* 3.0* 2.8*   RBC 3.74* 3.52* 4.04*   HGB 11.6* 11.1* 12.4*   HCT 32.0* 29.6* 34.3*   * 90* 104*   GRANS  --  74* 72   LYMPH  --  11* 15*   EOS  --  1 1        No results found for: SDES Lab Results   Component Value Date/Time    Culture result: ENTEROCOCCUS SPECIES (A) 02/13/2021 03:52 AM    Culture result: NO GROWTH AFTER 18 HOURS 02/12/2021 11:15 AM    Culture result: NO GROWTH AFTER 18 HOURS 02/12/2021 11:05 AM        Results     Procedure Component Value Units Date/Time    CULTURE, URINE [[id number]]  (Abnormal) Collected: 02/13/21 0352    Order Status: Completed Specimen: Urine from Clean catch Updated: 02/14/21 1100     Special Requests: NO SPECIAL REQUESTS        Sharon Count --        3000  COLONIES/mL       Culture result: ENTEROCOCCUS SPECIES       ENTERIC BACTERIA PANEL, DNA [[id number]]     Order Status: Sent Specimen: Stool     CULTURE, BODY FLUID Ilia Sands STAIN [[id number]]     Order Status: Sent Specimen: Ascitic Fluid     CULTURE, BLOOD [[id number]] Collected: 02/12/21 1115    Order Status: Completed Specimen: Blood Updated: 02/14/21 0655     Special Requests: NO SPECIAL REQUESTS        Culture result: NO GROWTH AFTER 18 HOURS       CULTURE, BLOOD [[id number]] Collected: 02/12/21 1105    Order Status: Completed Specimen: Blood Updated: 02/14/21 0655     Special Requests: NO SPECIAL REQUESTS        Culture result: NO GROWTH AFTER 18 HOURS             Imaging:     Ct Abd Pelv Wo Cont    Result Date: 2/12/2021  1. Nodular hepatic contour suggesting underlying cirrhosis. Discrete mass or biliary dilation not appreciated. 2. Splenomegaly. Gastrohepatic varices. Recanalized umbilical vein. Mesenteric edema and abdominal and pelvic ascites. Findings consistent with portal hypertension. 3. No bowel distention to suggest obstruction. [facility]    Result Date: 2/12/2021  1. Findings consistent with hepatocellular disease. 2.  Mild ascites.  3.  Cholelithiasis but no distinct evidence to suggest acute cholecystitis. Xr Chest Port    Result Date: 2/13/2021  No acute cardiopulmonary process.

## 2021-02-15 ENCOUNTER — APPOINTMENT (OUTPATIENT)
Dept: ULTRASOUND IMAGING | Age: 61
DRG: 280 | End: 2021-02-15
Attending: FAMILY MEDICINE
Payer: MEDICAID

## 2021-02-15 LAB
AFP L3 MFR SERPL: 12.5 % (ref 0–9.9)
AFP SERPL-MCNC: 3.9 NG/ML (ref 0–8)
ALBUMIN SERPL-MCNC: 2 G/DL (ref 3.4–5)
ALBUMIN/GLOB SERPL: 0.4 {RATIO} (ref 0.8–1.7)
ALP SERPL-CCNC: 179 U/L (ref 45–117)
ALT SERPL-CCNC: 445 U/L (ref 16–61)
AMMONIA PLAS-SCNC: 103 UMOL/L (ref 11–32)
ANION GAP SERPL CALC-SCNC: 7 MMOL/L (ref 3–18)
AST SERPL-CCNC: 744 U/L (ref 10–38)
BILIRUB SERPL-MCNC: 18.7 MG/DL (ref 0.2–1)
BUN SERPL-MCNC: 18 MG/DL (ref 7–18)
BUN/CREAT SERPL: 23 (ref 12–20)
CALCIUM SERPL-MCNC: 7.5 MG/DL (ref 8.5–10.1)
CHLORIDE SERPL-SCNC: 102 MMOL/L (ref 100–111)
CO2 SERPL-SCNC: 24 MMOL/L (ref 21–32)
CREAT SERPL-MCNC: 0.77 MG/DL (ref 0.6–1.3)
GLOBULIN SER CALC-MCNC: 5.2 G/DL (ref 2–4)
GLUCOSE BLD STRIP.AUTO-MCNC: 233 MG/DL (ref 70–110)
GLUCOSE BLD STRIP.AUTO-MCNC: 292 MG/DL (ref 70–110)
GLUCOSE BLD STRIP.AUTO-MCNC: 64 MG/DL (ref 70–110)
GLUCOSE BLD STRIP.AUTO-MCNC: 82 MG/DL (ref 70–110)
GLUCOSE BLD STRIP.AUTO-MCNC: 95 MG/DL (ref 70–110)
GLUCOSE SERPL-MCNC: 89 MG/DL (ref 74–99)
HAV IGM SER QL: POSITIVE
HIV 1+2 AB+HIV1 P24 AG SERPL QL IA: NONREACTIVE
HIV12 RESULT COMMENT, HHIVC: NORMAL
INR PPP: 1.8 (ref 0.8–1.2)
POTASSIUM SERPL-SCNC: 3.5 MMOL/L (ref 3.5–5.5)
PROT SERPL-MCNC: 7.2 G/DL (ref 6.4–8.2)
PROTHROMBIN TIME: 20.2 SEC (ref 11.5–15.2)
SODIUM SERPL-SCNC: 133 MMOL/L (ref 136–145)

## 2021-02-15 PROCEDURE — 74011250637 HC RX REV CODE- 250/637: Performed by: FAMILY MEDICINE

## 2021-02-15 PROCEDURE — 76705 ECHO EXAM OF ABDOMEN: CPT

## 2021-02-15 PROCEDURE — 65660000000 HC RM CCU STEPDOWN

## 2021-02-15 PROCEDURE — 82962 GLUCOSE BLOOD TEST: CPT

## 2021-02-15 PROCEDURE — 80053 COMPREHEN METABOLIC PANEL: CPT

## 2021-02-15 PROCEDURE — 85610 PROTHROMBIN TIME: CPT

## 2021-02-15 PROCEDURE — 99233 SBSQ HOSP IP/OBS HIGH 50: CPT | Performed by: INTERNAL MEDICINE

## 2021-02-15 PROCEDURE — 74011250636 HC RX REV CODE- 250/636: Performed by: GENERAL PRACTICE

## 2021-02-15 PROCEDURE — 82140 ASSAY OF AMMONIA: CPT

## 2021-02-15 PROCEDURE — 36415 COLL VENOUS BLD VENIPUNCTURE: CPT

## 2021-02-15 PROCEDURE — 74011250636 HC RX REV CODE- 250/636: Performed by: INTERNAL MEDICINE

## 2021-02-15 PROCEDURE — 74011636637 HC RX REV CODE- 636/637: Performed by: FAMILY MEDICINE

## 2021-02-15 PROCEDURE — 74011250637 HC RX REV CODE- 250/637: Performed by: GENERAL PRACTICE

## 2021-02-15 RX ORDER — FUROSEMIDE 10 MG/ML
20 INJECTION INTRAMUSCULAR; INTRAVENOUS ONCE
Status: COMPLETED | OUTPATIENT
Start: 2021-02-15 | End: 2021-02-15

## 2021-02-15 RX ADMIN — Medication 600 MG: at 22:20

## 2021-02-15 RX ADMIN — LACTULOSE 30 ML: 20 SOLUTION ORAL at 17:42

## 2021-02-15 RX ADMIN — INSULIN LISPRO 4 UNITS: 100 INJECTION, SOLUTION INTRAVENOUS; SUBCUTANEOUS at 17:42

## 2021-02-15 RX ADMIN — Medication 10 ML: at 06:24

## 2021-02-15 RX ADMIN — FUROSEMIDE 20 MG: 10 INJECTION, SOLUTION INTRAMUSCULAR; INTRAVENOUS at 16:26

## 2021-02-15 RX ADMIN — LACTULOSE 30 ML: 20 SOLUTION ORAL at 10:01

## 2021-02-15 RX ADMIN — RIFAXIMIN 550 MG: 550 TABLET ORAL at 17:42

## 2021-02-15 RX ADMIN — FOLIC ACID 1 MG: 1 TABLET ORAL at 10:03

## 2021-02-15 RX ADMIN — Medication 10 ML: at 00:09

## 2021-02-15 RX ADMIN — Medication 10 ML: at 14:16

## 2021-02-15 RX ADMIN — PANTOPRAZOLE SODIUM 40 MG: 40 TABLET, DELAYED RELEASE ORAL at 06:30

## 2021-02-15 RX ADMIN — QUETIAPINE FUMARATE 600 MG: 300 TABLET, EXTENDED RELEASE ORAL at 22:20

## 2021-02-15 RX ADMIN — LORAZEPAM 1 MG: 2 INJECTION INTRAMUSCULAR; INTRAVENOUS at 22:14

## 2021-02-15 RX ADMIN — INSULIN LISPRO 6 UNITS: 100 INJECTION, SOLUTION INTRAVENOUS; SUBCUTANEOUS at 12:48

## 2021-02-15 RX ADMIN — NADOLOL 20 MG: 40 TABLET ORAL at 10:02

## 2021-02-15 RX ADMIN — THERA TABS 1 TABLET: TAB at 10:03

## 2021-02-15 RX ADMIN — Medication 10 ML: at 22:15

## 2021-02-15 NOTE — PROGRESS NOTES
Reason for Admission:  Liver cirrhosis ([facility] [K74.60]                 RUR Score:    20            Plan for utilizing home health:    yes                      Likelihood of Readmission:   Moderate                         Do you (patient/family) have any concerns for transition/discharge?  no    Transition of Care Plan:       Initial assessment completed with patient. Cognitive status of patient: oriented to time, place, person and situation. Face sheet information confirmed:  yes. The patient designates Brother in law to participate in his discharge plan and to receive any needed information. This patient lives in a single family home with patient and brother. Patient is able to navigate steps as needed. Prior to hospitalization, patient was considered to be independent with ADLs/IADLS : yes . If not independent,  patient needs assist with : food preparation and cooking    Patient has a current ACP document on file: no  The patient and brother will be available to transport patient home upon discharge. The patient already has none reported, medical equipment available in the home. Patient is not currently active with home health. Patient has stayed in a skilled nursing facility or rehab. Was  stay within last 60 days : no. This patient is on dialysis :no    List of available Home Health agencies were provided and reviewed with the patient prior to discharge. Freedom of choice signed: yes, for Personal touch. Currently, the discharge plan is Home with [address] [place] [address] Ruben Richardson. The patient states that he can obtain his medications from the pharmacy, and take his medications as directed. Patient's current insurance is Medicaid      Care Management Interventions  PCP Verified by CM:  Yes  Mode of Transport at Discharge: Self  Current Support Network: [address] [facility] Follow Up Transport: Family  The Plan for Transition of Care is Related to the Following Treatment Goals : Home health  The Patient and/or Patient Representative was Provided with a Choice of Provider and Agrees with the Discharge Plan?: Yes  Freedom of Choice List was Provided with Basic Dialogue that Supports the Patient's Individualized Plan of Care/Goals, Treatment Preferences and Shares the Quality Data Associated with the Providers?: Yes  Discharge Location  Discharge Placement: Home with home health        Chance Mullen RN BSN  Care Manager  [phone]

## 2021-02-15 NOTE — PROGRESS NOTES
Physician Progress Note      Jag Kathleen  [facility] #:                  [id number]  :                       1960  ADMIT DATE:       2021 11:04 AM  DISCH DATE:  RESPONDING  PROVIDER #:        Jaylene DURAN DO          QUERY TEXT:    Pt admitted with cirrhosis and has malnutrition documented by RD. Please further specify type of malnutrition with documentation in the medical record. The medical record reflects the following:  Risk Factors: cirrhosis, hepatitis A, NICK, CHF, CM    Clinical Indicators:  \"Malnutrition Assessment:  Malnutrition Status:  Severe malnutrition  Context:  Social/environmental circumstances  Findings of the 6 clinical characteristics of malnutrition:  Energy Intake:  7 - 50% or less est energy requirements for 1 month or longer  Weight Loss:  7.0 - Greater than 7.5% over 3 months  Body Fat Loss:  No significant body fat loss,  Muscle Mass Loss:  No significant muscle mass loss,  Fluid Accumulation:  (fluid accumulation related to other contributing factors),   Strength:  Not performed \"  per Rustam Mckoy RD, consult note, 2021. BMI: 27.84 kg/m 2    Treatment:  \"- Continue current diet order and Ensure Enlive TID  - Clarify HS snack in order.  - Continue MVI, thiamine, and folic acid. \"  per Rustam Mckoy RD, consult note, 2021. Thank you,  Cornelius Mann RN, [facility]  Office #:  [phone]  Options provided:  -- Severe Malnutrition  -- Other - I will add my own diagnosis  -- Disagree - Not applicable / Not valid  -- Disagree - Clinically unable to determine / Unknown  -- Refer to Clinical Documentation Reviewer    PROVIDER RESPONSE TEXT:    This patient has severe malnutrition. Query created by:  uRfus Howard on 2/15/2021 10:35 AM      Electronically signed by:  Kassandra Boykin DO 2/15/2021 1:03 PM

## 2021-02-15 NOTE — PROGRESS NOTES
I spoke with pt niece Osmani Lopez after obtaining verbal permission from pt to do so. Ms. Na Sanders expresses concern for pt living situation. She states that he lives in a 2 bedroom trailer home with 5 other people. She states that trailer is covered in feces and has 15+ animals living inside. She would like to stay updated on pt. Discharge disposition as plans are made as she is pt's primary caregiver. I have passed this information along to César torres, our discharge planner.

## 2021-02-15 NOTE — PROGRESS NOTES
Interventional Radiology    Patient seen for paracentesis. There is insufficient fluid for a safe paracentesis at this time. Procedure not performed. US ABD saved for documentation. IR will remain available as needed.     Thank you,  Lorrie Heimlich, [address]

## 2021-02-15 NOTE — PROGRESS NOTES
Progress Note    Patient: Jabier Rubio MRN: [id number]  SSN: [id number]    YOB: 1960  Age: 62 y.o. Sex: male      Admit Date: 2/12/2021    LOS: 3 days     Subjective:     Feeling tired this morning but otherwise without acute complaints. Sleeping poorly; this has been the case for several months. Denies nausea/vomiting; tolerating PO intake. Denies abdominal pain or increasing abdominal girth/distension. Objective:     Vitals:    02/14/21 2000 02/15/21 0000 02/15/21 0400 02/15/21 0850   BP: 109/68 110/76 113/70 119/74   Pulse: 64 61 64 66   Resp: 18 19 19 20   Temp: 98.5 °F (36.9 °C) 98.4 °F (36.9 °C) 98.6 °F (37 °C) 98 °F (36.7 °C)   SpO2: 99% 98% 99% 98%   Weight:       Height:            Intake and Output:  Current Shift: No intake/output data recorded. Last three shifts: 02/13 1901 - 02/15 0700  In: 840 [P.O.:840]  Out: 700 [Urine:700]    Physical Exam:   GENERAL: alert, fatigued, cooperative, slowed mentation, appears stated age  LUNG: clear to auscultation bilaterally  HEART: regular rate and rhythm, S1, S2 normal, no murmur, click, rub or gallop  ABDOMEN: Soft, moderately distended, nontender, no masses  EXTREMITIES:  extremities normal, atraumatic, no cyanosis or edema  SKIN: Global jaundice with scleral icterus; sublingual jaundice noted. NEUROLOGIC: positive findings: Some delayed responses to questioning. Positive asterixis.    PSYCHIATRIC: depressed    Lab/Data Review:  CMP:   Lab Results   Component Value Date/Time     (L) 02/15/2021 03:23 AM    K 3.5 02/15/2021 03:23 AM     02/15/2021 03:23 AM    CO2 24 02/15/2021 03:23 AM    AGAP 7 02/15/2021 03:23 AM    GLU 89 02/15/2021 03:23 AM    BUN 18 02/15/2021 03:23 AM    CREA 0.77 02/15/2021 03:23 AM    GFRAA >60 02/15/2021 03:23 AM    GFRNA >60 02/15/2021 03:23 AM    CA 7.5 (L) 02/15/2021 03:23 AM    ALB 2.0 (L) 02/15/2021 03:23 AM    TP 7.2 02/15/2021 03:23 AM    GLOB 5.2 (H) 02/15/2021 03:23 AM    AGRAT 0.4 (L) 02/15/2021 03:23 AM     (H) 02/15/2021 03:23 AM     Results for Xiomara Farrar (MRN [id number]) as of 2/15/2021 11:52   Ref. Range 2/14/2021 01:50   WBC Latest Ref Range: 4.6 - 13.2 K/uL 2.8 (L)   RBC Latest Ref Range: 4.70 - 5.50 M/uL 3.74 (L)   HGB Latest Ref Range: 13.0 - 16.0 g/dL 11.6 (L)   HCT Latest Ref Range: 36.0 - 48.0 % 32.0 (L)   MCV Latest Ref Range: 74.0 - 97.0 FL 85.6   MCH Latest Ref Range: 24.0 - 34.0 PG 31.0   MCHC Latest Ref Range: 31.0 - 37.0 g/dL 36.3   RDW Latest Ref Range: 11.6 - 14.5 % 17.2 (H)   PLATELET Latest Ref Range: 135 - 420 K/uL 103 (L)   MPV Latest Ref Range: 9.2 - 11.8 FL 10.5         Assessment:     Principal Problem:    Liver cirrhosis ([facility] [address] (2/12/2021)    Decompensated cirrhosis with transaminitis:  MELD 27 <-- from 24 yesterday; GI following   - Hepatic encephalopathy    - Ascites pending paracentesis. SBP prophylaxis if evidence of GI bleeding.                - 2/12 CT abd: nodular hepatic contours consistent with cirrhosis, no discrete mass; splenomegaly, gastrohepatic varices, mesenteric edema with abdominal and pelvic ascites c/w portal hypertension              - 2/12 US liver: mild ascites, no masses, cholelithiasis without cholecyctitis  Acute Hep A Hep A IgM positive. - HCV serology pending  Hyperbilirubinemia with non-obstructive jaundice:  Increase to 18.7 today  Hepatic encephalopathy:  Ammonia 103; asterixis and sleep disturbances  Acute Hyponatremia- 133 today, improving   Hypokalemia  NICK  Pancytopenia: unchanged since 2018  Coagulopathy with elevated INR due to cirrhosis:  INR 1.8, PT 20.2  Dm type 2:  Hb A1c 6.4; on SSI and accuchecks. POC glucoses well controlled on insulin lispro without basal insulin. Hx ETOH abuse:  BAL upon admission <3. No clinical EtOH w/d.    A.fib:  Currently rate controlled; dx 8/2020 due to ETOH withdrawal; followed by Dr. Fam Weeks; was on Eliquis and toprol  Non-obstructive CAD:  Cath in 2011     Plan: Continue Nadolol, folate, lactulose, PPI, thiamine. Consider rifaximin, defer to GI. Continue to hold Eliquis; no signs of active bleeding currently. Daily MELD labs, coags, CBC  HCV serology pending. Awaiting completion of paracentesis; diagnostic labs ordered. Anticipate high SAAG. Clinical monitoring for e/o fulminant liver failure d/t decompensated cirrhosis in the setting of acute HAV infection. Message left with [facility] for possible transfer and transplant eval; the patient said he would be interested in transfer and transplant if possible (he may not be a candidate due to active alcohol use).      I have discussed the above assessment and plan with Dr. Cathleen Rosenberg    Signed By: Meredith Miranda MD     February 15, 2021

## 2021-02-15 NOTE — PROGRESS NOTES
Problem: Risk for Spread of Infection  Goal: Prevent transmission of infectious organism to others  Description: Prevent the transmission of infectious organisms to other patients, staff members, and visitors. Outcome: Progressing Towards Goal     Problem: Patient Education:  Go to Education Activity  Goal: Patient/Family Education  Outcome: Progressing Towards Goal     Problem: Falls - Risk of  Goal: *Absence of Falls  Description: Document Gabe Stephanie Fall Risk and appropriate interventions in the flowsheet.   Outcome: Progressing Towards Goal  Note: Fall Risk Interventions:  Mobility Interventions: OT consult for ADLs, Patient to call before getting OOB, Assess mobility with egress test, Bed/chair exit alarm         Medication Interventions: Assess postural VS orthostatic hypotension, Patient to call before getting OOB, Teach patient to arise slowly    Elimination Interventions: Bed/chair exit alarm, Call light in reach, Toileting schedule/hourly rounds    History of Falls Interventions: Bed/chair exit alarm, Door open when patient unattended

## 2021-02-15 NOTE — PROGRESS NOTES
1930: Assumed patient care from [facility]. Patient is alert and oriented to person, place, time and situation. Respiratory status is stable on room air. Vital signs are stable. MEWS score is a one. Patient denies any pain, discomfort, nausea vomiting dizziness or anxiety. White board and fall card is updated. Bed is locked and in lowest position. Call bell, water and personal belongings are within reach. Patient has no questions, comments or concerns after bedside shift report. 0700: Patient had an uneventful shift. Respiratory status, vital signs and MEWS score remained stable. Patient was resting quietly with no signs of distress noted. Bed locked and in lowest position. Call bell water and personal belongings were within reach. Patient had no questions, comments or concerns after bedside shift report.  Bedside report given to [facility] MANA

## 2021-02-15 NOTE — PROGRESS NOTES
[email or web address]  [phone]    Gastroenterology follow up-Progress note    Impression:  1. Decompensated cirrhosis               - related to alcohol use disorder with superimposed acute viral hepatitis A infection.               - MELD 34 on admission, down to ~ 26 today   - AFP L3 pending   - Trial of corticosteroid unlikely to change decompensated state although Maddrey score ~ 41 indicative of poor prognosis   - Can consider discussion with [facility] for decompensated cirrhosis (especially if the numbers get worse or if he develops significant encephalopathy, though overall, I feel that he would be a poor candidate for liver transplant evaluation as he seems to have poor insight/understanding into his problems and continues to drink alcohol.               - normally follows with Dr. Demetrio Alexander, but has also seen Dr. Pablo Martinez in the past.  2. Non-obstructive jaundice due to decompensated cirrhosis              - Tbili 18.7  3. Abnormal LFTs              - ; ; Alk phos 179-this is mainly due to acute hep A. This is somewhat better today  4. Acute viral hepatitis A    - (+) IgM on 2/12/21 & 2/13/21  5. Alcohol use disorder              -states he has cut back on ETOH and \"only drinks here and there\" On questioning further, it appears that he drinks at least 2-3 drinks daily              -does not seem to understand the importance of stopping all alcohol exposure given cirrhosis. 6. Portal hypertension              -unclear if he has had a recent EGD. Given decompensation, will need updated variceal screen-can be done electively. Started on Nadolol-continue for now. 7. Mild/moderate ascites              -low sodium diet              -unable to start diuretics due to NICK-can start when NICK improves.               -avoid NSAIDs              -Recommend diagnostic and therapeutic paracentesis as appropriate  8. Asymptomatic gallstones  9. Umbilical hernia containing mesenteric fat  10.  Periumbilical abdominal pain  11. Hepatic encephalopathy with hyperammonemia   - mild confusion noted  12. At risk for malnutrition/sarcopenia  13. Inflammatory colonic polyp (Dr Ismael Smith) 6/2014  14. Chronic thrombocytopenia without overt bleeding  15. Hyponatremia              -poor prognostic sign in decompensated cirrhosis  16. CAD-Acute coronary syndrome 2016        Plan:  1. Please continue supportive care as established  2. Paracentesis and fluid study ordered. Recommend standing order for therapeutic paracentesis as needed  3. Continue nadolol once daily and hold if HR > 60 BPM   4. Endoscopic evaluation for variceal evaluation is appropriate. EGD timing dependent on patient's hospital course. Please continue non-selective BB (nadolol) to prevent variceal bleed. 5. Can consider diuretics when Cr improves and depending on abdominal fluid retention  6. Continue lactulose - can titrate to achieve no more than 2-3 soft stools a day. Add Xifaxan if encephalopathy progresses  7. NSAIDs/ASA/anticoagulant therapy not recommended given coagulopathy and decompensated liver. Tylenol up to 2000 gm a day is considered safe. 8. Continue night time snack and supplemental beverages with meals. 9. Consideration for potential transfer to [address] for management of decompensated liver disease [phone], especially with clinically worsening. 10. Outpatient therapy to address alcohol use disorder  11. Close outpatient GI follow-up is highly recommended  13. Medical management otherwise deferred to primary team        Chief Complaint: follow-up decompensated cirrhosis      Subjective:  No GI events overnight. Patient made unsolicited remark about \"a mean black \" while pointing toward the gurney but notes that the nurses have been nice. Notes that he is feeling less confused. He identified age and location with delayed responses. Denies abdominal pain, diarrhea, bleeding. Notes chronic back and shoulder pain. ROS: Denies any fevers, chills, rash.      General: awake, limited attention with mild confusion; generalized jaundice; intermittent non-purposeful movement noted   Neck: supple     Cardiovascular: normal rate    Pulmonary: normal effort normal   Abdominal: appearance normal, bowel sounds normal and soft, non-acute, non-tender     Patient Active Problem List   Diagnosis Code    Anxiety and depression F41.9, F32.9    Alcohol abuse F10.10    H/O lumbosacral spine surgery Z98.890    Chronic low back pain with sciatica M54.40, G89.29    H/O coronary angioplasty Z98.61    Depression F32.9    Suicidal thoughts R45.851    Pancytopenia ([address] X87.812    Essential hypertension with goal blood pressure less than 130/80 I10    Type 2 diabetes mellitus with diabetic polyneuropathy, without long-term current use of insulin (HCC) E11.42    CAD (coronary artery disease) I25.10    Tachycardia R00.0    Atrial fibrillation (HCC) I48.91    Mild concentric left ventricular hypertrophy (LVH) C93.9    Diastolic dysfunction O61.70    Tricuspid regurgitation I07.1    Mitral regurgitation I34.0    Alcohol dependence (HCC) F10.20    Family history of premature CAD Z82.49    Type 2 diabetes mellitus with diabetic neuropathy (HCC) E11.40    Ascites due to alcoholic hepatitis O40.82    Liver disease due to alcohol (HCC) K70.9    Liver cirrhosis (HCC) K74.60         Visit Vitals  /70   Pulse 64   Temp 98.6 °F (37 °C)   Resp 19   Ht 5' 10\" (1.778 m)   Wt 88 kg (194 lb 0.1 oz)   SpO2 99%   BMI 27.84 kg/m²           Intake/Output Summary (Last 24 hours) at 2/15/2021 0841  Last data filed at 2/15/2021 0400  Gross per 24 hour   Intake 240 ml   Output 200 ml   Net 40 ml       CBC w/Diff    Lab Results   Component Value Date/Time    WBC 2.8 (L) 02/14/2021 01:50 AM    RBC 3.74 (L) 02/14/2021 01:50 AM    HGB 11.6 (L) 02/14/2021 01:50 AM    HCT 32.0 (L) 02/14/2021 01:50 AM    MCV 85.6 02/14/2021 01:50 AM    MCH 31.0 02/14/2021 01:50 AM    MCHC 36.3 02/14/2021 01:50 AM    RDW 17.2 (H) 02/14/2021 01:50 AM     (L) 02/14/2021 01:50 AM    Lab Results   Component Value Date/Time    GRANS 74 (H) 02/13/2021 02:21 AM    LYMPH 11 (L) 02/13/2021 02:21 AM    EOS 1 02/13/2021 02:21 AM    BASOS 1 02/13/2021 02:21 AM      Basic Metabolic Profile   Recent Labs     02/15/21  0323 02/13/21 0221 02/13/21 0221   *   < > 130*   K 3.5   < > 3.1*      < > 97*   CO2 24   < > 26   BUN 18   < > 27*   CA 7.5*   < > 7.2*   MG  --   --  1.5*    < > = values in this interval not displayed. Hepatic Function    Lab Results   Component Value Date/Time    ALB 2.0 (L) 02/15/2021 03:23 AM    TP 7.2 02/15/2021 03:23 AM     (H) 02/15/2021 03:23 AM    No results found for: TBIL       Coags   Recent Labs     02/12/21  1107   PTP 20.2*   INR 1.8*   APTT 37.6*         Radiology  Ct Abd Pelv Wo Cont    Result Date: 2/12/2021  1. Nodular hepatic contour suggesting underlying cirrhosis. Discrete mass or biliary dilation not appreciated. 2. Splenomegaly. Gastrohepatic varices. Recanalized umbilical vein. Mesenteric edema and abdominal and pelvic ascites. Findings consistent with portal hypertension. 3. No bowel distention to suggest obstruction. [id number]    Result Date: 2/12/2021  1. Findings consistent with hepatocellular disease. 2.  Mild ascites. 3.  Cholelithiasis but no distinct evidence to suggest acute cholecystitis. Xr Chest Port    Result Date: 2/13/2021  No acute cardiopulmonary process. ALIVIA Elizondo    [facility]. [facility] [employer]  Phone: [phone]  Pager: [phone]

## 2021-02-16 PROBLEM — B15.9 HEPATITIS A INFECTION: Status: ACTIVE | Noted: 2021-02-16

## 2021-02-16 LAB
ALBUMIN SERPL-MCNC: 1.9 G/DL (ref 3.4–5)
ALBUMIN/GLOB SERPL: 0.4 {RATIO} (ref 0.8–1.7)
ALP SERPL-CCNC: 177 U/L (ref 45–117)
ALT SERPL-CCNC: 434 U/L (ref 16–61)
AMMONIA PLAS-SCNC: 90 UMOL/L (ref 11–32)
ANION GAP SERPL CALC-SCNC: 6 MMOL/L (ref 3–18)
AST SERPL-CCNC: 774 U/L (ref 10–38)
BILIRUB SERPL-MCNC: 20 MG/DL (ref 0.2–1)
BUN SERPL-MCNC: 18 MG/DL (ref 7–18)
BUN/CREAT SERPL: 23 (ref 12–20)
CALCIUM SERPL-MCNC: 7.8 MG/DL (ref 8.5–10.1)
CHLORIDE SERPL-SCNC: 101 MMOL/L (ref 100–111)
CO2 SERPL-SCNC: 25 MMOL/L (ref 21–32)
COVID-19 RAPID TEST, COVR: NOT DETECTED
CREAT SERPL-MCNC: 0.79 MG/DL (ref 0.6–1.3)
GLOBULIN SER CALC-MCNC: 5.1 G/DL (ref 2–4)
GLUCOSE BLD STRIP.AUTO-MCNC: 135 MG/DL (ref 70–110)
GLUCOSE BLD STRIP.AUTO-MCNC: 168 MG/DL (ref 70–110)
GLUCOSE BLD STRIP.AUTO-MCNC: 200 MG/DL (ref 70–110)
GLUCOSE BLD STRIP.AUTO-MCNC: 99 MG/DL (ref 70–110)
GLUCOSE SERPL-MCNC: 109 MG/DL (ref 74–99)
INR PPP: 1.8 (ref 0.8–1.2)
POTASSIUM SERPL-SCNC: 3.8 MMOL/L (ref 3.5–5.5)
PROT SERPL-MCNC: 7 G/DL (ref 6.4–8.2)
PROTHROMBIN TIME: 20.9 SEC (ref 11.5–15.2)
SARS-COV-2, COV2: NORMAL
SODIUM SERPL-SCNC: 132 MMOL/L (ref 136–145)
SOURCE, COVRS: NORMAL

## 2021-02-16 PROCEDURE — 80053 COMPREHEN METABOLIC PANEL: CPT

## 2021-02-16 PROCEDURE — 82962 GLUCOSE BLOOD TEST: CPT

## 2021-02-16 PROCEDURE — 85610 PROTHROMBIN TIME: CPT

## 2021-02-16 PROCEDURE — 74011250637 HC RX REV CODE- 250/637: Performed by: FAMILY MEDICINE

## 2021-02-16 PROCEDURE — 74011636637 HC RX REV CODE- 636/637: Performed by: FAMILY MEDICINE

## 2021-02-16 PROCEDURE — 36415 COLL VENOUS BLD VENIPUNCTURE: CPT

## 2021-02-16 PROCEDURE — 65660000000 HC RM CCU STEPDOWN

## 2021-02-16 PROCEDURE — 74011250637 HC RX REV CODE- 250/637: Performed by: HOSPITALIST

## 2021-02-16 PROCEDURE — 99233 SBSQ HOSP IP/OBS HIGH 50: CPT | Performed by: HOSPITALIST

## 2021-02-16 PROCEDURE — 87635 SARS-COV-2 COVID-19 AMP PRB: CPT

## 2021-02-16 PROCEDURE — 2709999900 HC NON-CHARGEABLE SUPPLY

## 2021-02-16 PROCEDURE — 74011250637 HC RX REV CODE- 250/637: Performed by: GENERAL PRACTICE

## 2021-02-16 PROCEDURE — 82140 ASSAY OF AMMONIA: CPT

## 2021-02-16 RX ORDER — NADOLOL 20 MG/1
20 TABLET ORAL DAILY
Qty: 30 TAB | Refills: 0 | Status: SHIPPED
Start: 2021-02-17

## 2021-02-16 RX ORDER — THERA TABS 400 MCG
1 TAB ORAL DAILY
Qty: 30 TAB | Refills: 0 | Status: SHIPPED
Start: 2021-02-17

## 2021-02-16 RX ADMIN — Medication 10 ML: at 06:03

## 2021-02-16 RX ADMIN — INSULIN LISPRO 2 UNITS: 100 INJECTION, SOLUTION INTRAVENOUS; SUBCUTANEOUS at 21:59

## 2021-02-16 RX ADMIN — PANTOPRAZOLE SODIUM 40 MG: 40 TABLET, DELAYED RELEASE ORAL at 09:53

## 2021-02-16 RX ADMIN — Medication 600 MG: at 21:45

## 2021-02-16 RX ADMIN — Medication 10 ML: at 21:47

## 2021-02-16 RX ADMIN — RIFAXIMIN 550 MG: 550 TABLET ORAL at 09:52

## 2021-02-16 RX ADMIN — LACTULOSE 30 ML: 20 SOLUTION ORAL at 09:52

## 2021-02-16 RX ADMIN — Medication 10 ML: at 14:00

## 2021-02-16 RX ADMIN — NADOLOL 20 MG: 40 TABLET ORAL at 09:54

## 2021-02-16 RX ADMIN — LACTULOSE 45 ML: 20 SOLUTION ORAL at 16:25

## 2021-02-16 RX ADMIN — LACTULOSE 45 ML: 20 SOLUTION ORAL at 21:45

## 2021-02-16 RX ADMIN — FOLIC ACID 1 MG: 1 TABLET ORAL at 09:52

## 2021-02-16 RX ADMIN — RIFAXIMIN 550 MG: 550 TABLET ORAL at 18:04

## 2021-02-16 RX ADMIN — INSULIN LISPRO 2 UNITS: 100 INJECTION, SOLUTION INTRAVENOUS; SUBCUTANEOUS at 16:33

## 2021-02-16 RX ADMIN — THERA TABS 1 TABLET: TAB at 09:53

## 2021-02-16 RX ADMIN — QUETIAPINE FUMARATE 600 MG: 300 TABLET, EXTENDED RELEASE ORAL at 21:45

## 2021-02-16 NOTE — PROGRESS NOTES
Bedside shift change report given to Moe Jimenez (oncoming nurse) by Pooja Freed (offgoing nurse). Report included the following information SBAR.

## 2021-02-16 NOTE — PROGRESS NOTES
[email or web address]  [phone]    Gastroenterology follow up-Progress note    Impression:  1. Decompensated cirrhosis/hepatic failure               - related to alcohol use disorder with superimposed acute viral hepatitis A infection.               - MELD 34 on admission, down to ~ 27 today              - AFP L3% 12.9 (H)              - Trial of corticosteroid unlikely to change decompensated state although Maddrey score ~ 41 indicative of poor prognosis              - Can consider discussion with [facility] for decompensated cirrhosis (especially if the numbers get worse or if he develops significant encephalopathy, though overall, I feel that he would be a poor candidate for liver transplant evaluation as he seems to have poor insight/understanding into his problems and continues to drink alcohol.               - normally follows with Dr. Carol Goldman, but has also seen Dr. Heather Tucker in the past.  2. Non-obstructive jaundice due to decompensated cirrhosis              - Tbili 20.0  3. Abnormal LFTs              - ; ; Alk phos 177-this is mainly due to acute hep A.   4. Acute viral hepatitis A               - (+) IgM on 2/12/21 & 2/13/21  5. Alcohol use disorder              -states he has cut back on ETOH and \"only drinks here and there\" On questioning further, it appears that he drinks at least 2-3 drinks daily              -does not seem to understand the importance of stopping all alcohol exposure given cirrhosis. 6. Portal hypertension              -unclear if he has had a recent EGD. Given decompensation, will need updated variceal screen-can be done electively. Started on Nadolol-continue for now. 7. Mild/moderate ascites              -low sodium diet              -unable to start diuretics due to NICK-can start when NICK improves.               -avoid NSAIDs              -paracentesis unsuccessful due to small volume fluid  8. Asymptomatic gallstones  9. Umbilical hernia containing mesenteric fat  10. Periumbilical abdominal pain  11. Hepatic encephalopathy with hyperammonemia at 90  12. At risk for malnutrition/sarcopenia   - albumin 1.9  13. Inflammatory colonic polyp (Dr Arun Reyes) 6/2014  14. Chronic thrombocytopenia without overt bleeding  15. Hyponatremia              -poor prognostic sign in decompensated cirrhosis  16. CAD-Acute coronary syndrome 2016  17. Hypotension      Plan:  1. Alternate feeding method with feeding tube such as Flexiflo feeding tube if available due to poor oral intake. Dr Herb Medina discussed with Dr Keshav Echols (hospitalist), dietitian and rounding team.   2. Will follow-up on inter-facility transfer either to [facility] [place] (Dr Ronak Ace) or [facility] hepatology [phone]). Dr Herb Medina plans to call. 3. Continue nadolol once daily and hold if HR > 60 BPM   4. Endoscopic evaluation for variceal evaluation is appropriate. EGD timing dependent on patient's hospital course. Please continue non-selective BB (nadolol) to prevent variceal bleed. 5. Change to rectal lactulose TID - can titrate to achieve no more than 2-3 soft stools a day. Continue Xifaxan if encephalopathy progresses  6. Can hold therapeutic diuretics for now since small volume abdominal fluid retention  7. NSAIDs/ASA/anticoagulant therapy not recommended given coagulopathy and decompensated liver. Tylenol up to 2000 gm a day is considered safe. 8. Outpatient therapy to address alcohol use disorder  9. Close outpatient GI follow-up is highly recommended  10. Medical management otherwise deferred to primary team        Subjective:  No acute GI events overnight. Less engaging today. Per NAKIA Padron, patient took scheduled morning medications to include lactulose and Xifaxan. Last BM was yesterday. He's somnolent today and responds to voice and touch. Denies abdominal or chest pain. Notes that he's tired.      ROS: as in HPI     General: somnolent   Eyes: bilateral icterus   Cardiovascular: normal rate     Pulmonary/Chest Wall: normal effort normal   Abdominal: bowel sounds normal and soft, non-acute, non-tender     Patient Active Problem List   Diagnosis Code    Anxiety and depression F41.9, F32.9    Alcohol abuse F10.10    H/O lumbosacral spine surgery Z98.890    Chronic low back pain with sciatica M54.40, G89.29    H/O coronary angioplasty Z98.61    Depression F32.9    Suicidal thoughts R45.851    Pancytopenia ([facility] I36.699    Essential hypertension with goal blood pressure less than 130/80 I10    Type 2 diabetes mellitus with diabetic polyneuropathy, without long-term current use of insulin (HCC) E11.42    CAD (coronary artery disease) I25.10    Tachycardia R00.0    Atrial fibrillation (HCC) I48.91    Mild concentric left ventricular hypertrophy (LVH) T00.4    Diastolic dysfunction F32.56    Tricuspid regurgitation I07.1    Mitral regurgitation I34.0    Alcohol dependence (HCC) F10.20    Family history of premature CAD Z82.49    Type 2 diabetes mellitus with diabetic neuropathy (HCC) E11.40    Ascites due to alcoholic hepatitis R15.24    Liver disease due to alcohol ([facility] K70.9    Liver cirrhosis (HCC) K74.60         Visit Vitals  BP (!) 93/59 (BP 1 Location: Right upper arm) Comment: notified nurse   Pulse 62   Temp 98.2 °F (36.8 °C)   Resp 18   Ht 5' 10\" (1.778 m)   Wt 90.3 kg (199 lb)   SpO2 97%   BMI 28.55 kg/m²           Intake/Output Summary (Last 24 hours) at 2/16/2021 1203  Last data filed at 2/15/2021 1625  Gross per 24 hour   Intake 120 ml   Output    Net 120 ml       CBC w/Diff    Lab Results   Component Value Date/Time    WBC 2.8 (L) 02/14/2021 01:50 AM    RBC 3.74 (L) 02/14/2021 01:50 AM    HGB 11.6 (L) 02/14/2021 01:50 AM    HCT 32.0 (L) 02/14/2021 01:50 AM    MCV 85.6 02/14/2021 01:50 AM    MCH 31.0 02/14/2021 01:50 AM    MCHC 36.3 02/14/2021 01:50 AM    RDW 17.2 (H) 02/14/2021 01:50 AM     (L) 02/14/2021 01:50 AM    Lab Results   Component Value Date/Time    GRANS 74 (H) 02/13/2021 02:21 AM LYMPH 11 (L) 02/13/2021 02:21 AM    EOS 1 02/13/2021 02:21 AM    BASOS 1 02/13/2021 02:21 AM      Basic Metabolic Profile   Recent Labs     02/16/21  0309   *   K 3.8      CO2 25   BUN 18   CA 7.8*        Hepatic Function    Lab Results   Component Value Date/Time    ALB 1.9 (L) 02/16/2021 03:09 AM    TP 7.0 02/16/2021 03:09 AM     (H) 02/16/2021 03:09 AM    No results found for: TBIL       Coags   Recent Labs     02/16/21  0309 02/15/21  1303   PTP 20.9* 20.2*   INR 1.8* 1.8*               ALIVIA Nieto    [facility]. [email or web address]  Phone: [phone]  Pager: [phone]

## 2021-02-16 NOTE — PROGRESS NOTES
As per verbal discussion with Traci Plummer, I was ordered to hold off on putting the NG tube in. Pt is taking meds and drinking the lactulose fine. The NG tube is in the room and awaiting insertion if needed. Pt will be ready for transfer at any time. Rapid covid done and is negative.

## 2021-02-16 NOTE — PROGRESS NOTES
Patient pending transfer to [address] [place] [address]. Waiting for bed assignment. [facility] packet on chart.      Manasa Cedeno RN BSN  Care Manager  [phone]

## 2021-02-16 NOTE — PROGRESS NOTES
Nutrition Assessment      Pt asleep/lethargic today; only able to eat 2-3 bites from breakfast this morning per nursing report. Sleeping during lunch time. Very difficult to arouse. Noted intake of 50% po intake x 1 meal over the weekend per chart documentation. Seen by GI today; recommending NGT placement if pt unable to take lactulose medication and take in adequate nutrition. Pt discussed during interdisciplinary rounds. Discussed with Dr Hoa Chavez about providing tube feeding recommendations if plan is for NGT placement for medication administration/ supplemental EN support. BM 2/15. Has been able to take PO medications this morning. Progression towards nutrition goals declining. Nutrition Recommendations/Plan:  - Continue current diet order, HS snack, and Ensure Enlive TID. Assistance with meals as pt needs  - Continue MVI, thiamine, and folic acid.    - If plan is to have NGT placed for supplemental EN support, recommend starting tube feeding of Jevity 1.5 at 20 mL/hr and advancing as pt tolerates by 10 mL q 8 hours to goal rate of 60 mL/hr with water flushes of 50 mL q 4 hours.      (goal EN provides: 2160 kcal, 119 gm protein, 1093 mL free water, 1393 mL total water, 100% RDIs)         Electronically signed by Klaudia Mccoy RD on 2/16/2021 at 12:23 PM    Contact Number: [phone]

## 2021-02-16 NOTE — PROGRESS NOTES
Progress Note    Patient: Riky Fox MRN: [id number]  SSN: [id number]    YOB: 1960  Age: 62 y.o. Sex: male      Admit Date: 2/12/2021    LOS: 4 days     Subjective:     Mr. Asad Hansen was much more somnolent during exam today. He was minimally responsive while being questioned. He was able to respond yes or no when asked simple questions and denied being in pain or having acute complaints. He was able to state the current year correctly but did not respond to further orientation questions. Objective:     Vitals:    02/15/21 2017 02/16/21 0005 02/16/21 0414 02/16/21 0800   BP: 126/80 102/66 95/65 101/62   Pulse: 67 62 (!) 59 72   Resp: 19 19 18 16   Temp: 97.7 °F (36.5 °C) 98.3 °F (36.8 °C) 97.7 °F (36.5 °C) 97.5 °F (36.4 °C)   SpO2: 97% 98% 100% 97%   Weight:  90.3 kg (199 lb)     Height:            Intake and Output:  Current Shift: No intake/output data recorded. Last three shifts: 02/14 1901 - 02/16 0700  In: 360 [P.O.:360]  Out: 200 [Urine:200]    Physical Exam:   GENERAL: fatigued, cooperative, slowed mentation, appears stated age, confused  EYE: positive findings: sclera icterus b/l  LUNG: clear to auscultation bilaterally  HEART: regular rate and rhythm, S1, S2 normal, no murmur, click, rub or gallop  ABDOMEN: Distended, nontender throughout, no appreciable fluid wave. No caput medusae or spider angiomata appreciated.   EXTREMITIES:  extremities normal, atraumatic, no cyanosis or edema  SKIN: Global jaundice noted  NEUROLOGIC: positive findings: confused, unable to determine presence or absence of asterixis due to confusion and somnolence during exam.    Lab/Data Review:  Labs: Results:       Chemistry Recent Labs     02/16/21  0309 02/15/21  0323 02/14/21  0150   * 89 88   * 133* 130*   K 3.8 3.5 3.6    102 97*   CO2 25 24 26   BUN 18 18 23*   CREA 0.79 0.77 1.22   CA 7.8* 7.5* 8.1*   AGAP 6 7 7   BUCR 23* 23* 19   * 179* 179*   TP 7.0 7.2 7.6   ALB 1.9* 2. 0* 2.2*   GLOB 5.1* 5.2* 5.4*   AGRAT 0.4* 0.4* 0.4*      CBC w/Diff Recent Labs     02/14/21  0150   WBC 2.8*   RBC 3.74*   HGB 11.6*   HCT 32.0*   *      Cardiac Enzymes No results for input(s): CPK, CKND1, KIERRA in the last 72 hours. No lab exists for component: CKRMB, TROIP   Coagulation Recent Labs     02/16/21  0309 02/15/21  1303   PTP 20.9* 20.2*   INR 1.8* 1.8*       Lipid Panel Lab Results   Component Value Date/Time    Cholesterol, total 205 (H) 07/10/2019 01:48 PM    HDL Cholesterol 55 07/10/2019 01:48 PM    LDL, calculated 119.8 (H) 07/10/2019 01:48 PM    VLDL, calculated 30.2 07/10/2019 01:48 PM    Triglyceride 151 (H) 07/10/2019 01:48 PM    CHOL/HDL Ratio 3.7 07/10/2019 01:48 PM      BNP No results for input(s): BNPP in the last 72 hours. Liver Enzymes Recent Labs     02/16/21  0309   TP 7.0   ALB 1.9*   *      Thyroid Studies Lab Results   Component Value Date/Time    TSH 1.460 04/05/2019 03:23 PM          No results found for: SDES Lab Results   Component Value Date/Time    Culture result: ENTEROCOCCUS SPECIES (A) 02/13/2021 03:52 AM    Culture result: NO GROWTH 3 DAYS 02/12/2021 11:15 AM    Culture result: NO GROWTH 3 DAYS 02/12/2021 11:05 AM        Imaging:     Ct Abd Pelv Wo Cont    Result Date: 2/12/2021  1. Nodular hepatic contour suggesting underlying cirrhosis. Discrete mass or biliary dilation not appreciated. 2. Splenomegaly. Gastrohepatic varices. Recanalized umbilical vein. Mesenteric edema and abdominal and pelvic ascites. Findings consistent with portal hypertension. 3. No bowel distention to suggest obstruction. [address]    Result Date: 2/15/2021  Small volume perihepatic ascites, not sufficient for paracentesis. No paracentesis was performed. [address]    Result Date: 2/12/2021  1. Findings consistent with hepatocellular disease. 2.  Mild ascites. 3.  Cholelithiasis but no distinct evidence to suggest acute cholecystitis.     Xr Chest Port    Result Date: 2/13/2021  No acute cardiopulmonary process. Assessment:     Principal Problem:    Liver cirrhosis ([facility] [address] (2/12/2021)    Decompensated cirrhosis with transaminitis:  MELD-Na 27 today, stable (uptrending during admission); GI following             - Hepatic encephalopathy worse today              - 2/12 CT abd: nodular hepatic contours consistent with cirrhosis, no discrete mass; splenomegaly, gastrohepatic varices, mesenteric edema with abdominal and pelvic ascites c/w portal hypertension              - 2/12 US liver: mild ascites, no masses, cholelithiasis without cholecyctitis  Acute Hep A Hep A IgM positive. - HCV serology negative  Hyperbilirubinemia with non-obstructive jaundice:  Increase to 20 today from 18.7 yesterday  Hepatic encephalopathy:  Ammonia 90 <-- 103; asterixis and sleep disturbances. Acute Hyponatremia- 133 today, stable  Hypokalemia- improving  NICK- resolved  Pancytopenia: unchanged since 2018  Coagulopathy with elevated INR due to cirrhosis:  INR 1.8  Dm type 2:  Hb A1c 6.4; on SSI and accuchecks. POC glucoses well controlled on insulin lispro without basal insulin. Brief excursion with hyperglycemia bordering 300 yesterday, none since. Hx ETOH abuse:  BAL upon admission <3. No clinical EtOH w/d. A.fib:  Currently rate controlled; dx 8/2020 due to ETOH withdrawal; followed by Dr. Sara Rahman; was on Eliquis and toprol  Non-obstructive CAD:  Cath in 2011     Plan:     Continue Nadolol, folate, lactulose, PPI, thiamine. Rifaximin initiated yesterday, will continue due to worsening encephalopathy. Will consider placement of NG tube today to advance toward nutrition goals, and consider transition to rectal lactulose. Continue to hold Eliquis; no signs of active bleeding currently. Daily MELD labs, coags, CBC  Paracentesis canceled yesterday due to insufficient ascites. Continue to monitor for increasing abdominal distention.   Consider diagnostic/therapeutic paracentesis if adequate ascites for procedure.     Clinical monitoring for e/o fulminant liver failure d/t decompensated cirrhosis in the setting of acute HAV infection. Continue supportive therapy for acute hepatitis A infection.     Message left with [facility] for possible transfer and transplant eval; the patient said he would be interested in transfer and transplant if possible (he may not be a candidate due to active alcohol use). Signed By: Fan Rausch MD     February 16, 2021      Dragon medical dictation software was used for portions of this report. Unintended errors may occur.

## 2021-02-16 NOTE — PROGRESS NOTES
Patient has been trying to get up out of bed several times. He has a very unsteady gate and appears to have tremors while holding a cup with both hands. [facility] is agitated and wants to be up out of bed and demands water. Patient does not follow commands and when he does it is a delayed response. Patient is oriented to self, but does not know where he is, what year it is, nor his situation. He has scored a 12 on the CIWA scale.

## 2021-02-16 NOTE — PROGRESS NOTES
Problem: Falls - Risk of  Goal: *Absence of Falls  Description: Document Fabrice Allan Fall Risk and appropriate interventions in the flowsheet. Outcome: Progressing Towards Goal  Note: Fall Risk Interventions:  Mobility Interventions: Assess mobility with egress test, Bed/chair exit alarm, OT consult for ADLs, Patient to call before getting OOB, PT Consult for mobility concerns, PT Consult for assist device competence, Strengthening exercises (ROM-active/passive), Utilize walker, cane, or other assistive device         Medication Interventions: Assess postural VS orthostatic hypotension, Bed/chair exit alarm, Evaluate medications/consider consulting pharmacy, Patient to call before getting OOB, Teach patient to arise slowly    Elimination Interventions: Bed/chair exit alarm, Call light in reach, Patient to call for help with toileting needs, Toilet paper/wipes in reach, Toileting schedule/hourly rounds    History of Falls Interventions: Bed/chair exit alarm, Door open when patient unattended, Room close to nurse's station         Problem: Pressure Injury - Risk of  Goal: *Prevention of pressure injury  Description: Document Chucho Scale and appropriate interventions in the flowsheet. Outcome: Progressing Towards Goal  Note: Pressure Injury Interventions:  Sensory Interventions: Assess changes in LOC, Assess need for specialty bed, Avoid rigorous massage over bony prominences, Check visual cues for pain, Discuss PT/OT consult with provider, Float heels, Keep linens dry and wrinkle-free, Maintain/enhance activity level, Minimize linen layers, Pressure redistribution bed/mattress (bed type), Turn and reposition approx.  every two hours (pillows and wedges if needed)    Moisture Interventions: Absorbent underpads, Apply protective barrier, creams and emollients, Assess need for specialty bed, Check for incontinence Q2 hours and as needed, Limit adult briefs, Maintain skin hydration (lotion/cream), Minimize layers, Moisture barrier    Activity Interventions: Assess need for specialty bed, Increase time out of bed, Pressure redistribution bed/mattress(bed type), PT/OT evaluation    Mobility Interventions: Assess need for specialty bed, Float heels, HOB 30 degrees or less, PT/OT evaluation, Turn and reposition approx.  every two hours(pillow and wedges)    Nutrition Interventions: Document food/fluid/supplement intake, Offer support with meals,snacks and hydration    Friction and Shear Interventions: Apply protective barrier, creams and emollients, Minimize layers                Problem: Injury - Risk of, Adverse Drug Event  Goal: *Absence of adverse drug events  Outcome: Progressing Towards Goal     Problem: Injury - Risk of, Adverse Drug Event  Goal: *Absence of medication errors  Outcome: Progressing Towards Goal

## 2021-02-16 NOTE — DISCHARGE SUMMARY
Discharge Summary    Patient: Ousmane Orozco MRN: [id number]  CSN: [id number]    YOB: 1960  Age: 62 y.o. Sex: male    DOA: 2/12/2021 LOS:  LOS: 4 days   Discharge Date:      Admission Diagnoses: Liver cirrhosis ([facility] [K74.60]    Discharge Diagnoses:    Decompensated cirrhosis with transaminitis  Acute Hep A (IgM positive)  Hyperbilirubinemia with non-obstructive jaundice  Hepatic encephalopathy  Acute Hyponatremia, improved  Hypokalemia  NICK, resolved  Pancytopenia, chronic  Coagulopathy with elevated INR due to cirrhosis  Diabetes mellitus type 2  Hx ETOH abuse  Permanent atrial fibrillation (Eliquis held during admission due to coagulopathy)  Non-obstructive CAD    Discharge Condition: Stable    PHYSICAL EXAM  Visit Vitals  BP (!) 93/59 (BP 1 Location: Right upper arm)   Pulse 62   Temp 98.2 °F (36.8 °C)   Resp 18   Ht 5' 10\" (1.778 m)   Wt 90.3 kg (199 lb)   SpO2 97%   BMI 28.55 kg/m²     Physical Exam:   GENERAL: fatigued, cooperative, slowed mentation, appears stated age, confused  EYE: positive findings: sclera icterus b/l  LUNG: clear to auscultation bilaterally  HEART: regular rate and rhythm, S1, S2 normal, no murmur, click, rub or gallop  ABDOMEN: Distended, nontender throughout, no appreciable fluid wave. No caput medusae or spider angiomata appreciated. EXTREMITIES:  extremities normal, atraumatic, no cyanosis or edema  SKIN: Global jaundice noted  NEUROLOGIC: positive findings: confused, unable to determine presence or absence of asterixis due to confusion and somnolence during exam.    Hospital Course: Mr. Shasta Hodgkins is a 64-year-old male with history of alcoholic cirrhosis, atrial fibrillation on Eliquis, diastolic congestive heart failure, CAD, and type 2 diabetes who was admitted to [facility] for acutely decompensated cirrhosis complicated by acute hepatitis A infection.   During admission, he required minimal doses of intravenous benzodiazepines to manage alcohol withdrawal symptoms. A paracentesis was attempted, but there was insufficient ascites to complete the procedure. He became increasingly jaundiced during admission. Liver function tests were monitored daily throughout admission, and his MELD-Na score peaked at 27. His mental status worsened due to progressive hepatic encephalopathy despite the addition of lactulose, and rifaximin was initiated. These symptoms were thought by gastroenterology consultant to be due to a combination of decompensated alcoholic cirrhosis and superimposed acute hepatitis A. He was accepted to the liver transplant service at [facility] for evaluation for possible liver transplant and further treatment of his decompensated cirrhosis. Consults:    Gastroenterology    Lab/Data Review:  Labs: Results:       Chemistry Recent Labs     02/16/21  0309 02/15/21  0323 02/14/21  0150   * 89 88   * 133* 130*   K 3.8 3.5 3.6    102 97*   CO2 25 24 26   BUN 18 18 23*   CREA 0.79 0.77 1.22   CA 7.8* 7.5* 8.1*   AGAP 6 7 7   BUCR 23* 23* 19   * 179* 179*   TP 7.0 7.2 7.6   ALB 1.9* 2.0* 2.2*   GLOB 5.1* 5.2* 5.4*   AGRAT 0.4* 0.4* 0.4*      CBC w/Diff Recent Labs     02/14/21  0150   WBC 2.8*   RBC 3.74*   HGB 11.6*   HCT 32.0*   *      Cardiac Enzymes No results for input(s): CPK, CKND1, KIERRA in the last 72 hours. No lab exists for component: CKRMB, TROIP   Coagulation Recent Labs     02/16/21  0309 02/15/21  1303   PTP 20.9* 20.2*   INR 1.8* 1.8*       Lipid Panel Lab Results   Component Value Date/Time    Cholesterol, total 205 (H) 07/10/2019 01:48 PM    HDL Cholesterol 55 07/10/2019 01:48 PM    LDL, calculated 119.8 (H) 07/10/2019 01:48 PM    VLDL, calculated 30.2 07/10/2019 01:48 PM    Triglyceride 151 (H) 07/10/2019 01:48 PM    CHOL/HDL Ratio 3.7 07/10/2019 01:48 PM      BNP No results for input(s): BNPP in the last 72 hours.    Liver Enzymes Recent Labs     02/16/21  0309   TP 7.0   ALB 1.9*   *      Thyroid Studies Lab Results   Component Value Date/Time    TSH 1.460 04/05/2019 03:23 PM          No results found for: SDES Lab Results   Component Value Date/Time    Culture result: ENTEROCOCCUS SPECIES (A) 02/13/2021 03:52 AM    Culture result: NO GROWTH 3 DAYS 02/12/2021 11:15 AM    Culture result: NO GROWTH 3 DAYS 02/12/2021 11:05 AM        Imaging:     Ct Abd Pelv Wo Cont    Result Date: 2/12/2021  1. Nodular hepatic contour suggesting underlying cirrhosis. Discrete mass or biliary dilation not appreciated. 2. Splenomegaly. Gastrohepatic varices. Recanalized umbilical vein. Mesenteric edema and abdominal and pelvic ascites. Findings consistent with portal hypertension. 3. No bowel distention to suggest obstruction. [facility] [employer]    Result Date: 2/15/2021  Small volume perihepatic ascites, not sufficient for paracentesis. No paracentesis was performed. [facility] [employer]    Result Date: 2/12/2021  1. Findings consistent with hepatocellular disease. 2.  Mild ascites. 3.  Cholelithiasis but no distinct evidence to suggest acute cholecystitis. Xr Chest Port    Result Date: 2/13/2021  No acute cardiopulmonary process. Discharge Medications:     Current Discharge Medication List      START taking these medications    Details   lactulose (CHRONULAC) 10 gram/15 mL solution Take 45 mL by mouth three (3) times daily. Qty: 90 mL, Refills: 0      nadoloL (CORGARD) 20 mg tablet Take 1 Tab by mouth daily. Indications: prevent bleeding varicose vein in the esophagus  Qty: 30 Tab, Refills: 0      rifAXIMin (XIFAXAN) 550 mg tablet Take 1 Tab by mouth two (2) times a day. Qty: 30 Tab, Refills: 0      therapeutic multivitamin (THERAGRAN) tablet Take 1 Tab by mouth daily. Qty: 30 Tab, Refills: 0         CONTINUE these medications which have NOT CHANGED    Details   QUEtiapine SR (SEROquel XR) 300 mg sr tablet Take 2 Tabs by mouth nightly.   Qty: 60 Tab, Refills: 1    Associated Diagnoses: Depression, unspecified depression type; Insomnia, unspecified type      folic acid (FOLVITE) 1 mg tablet Take 1 mg by mouth daily. thiamine HCL (B-1) 100 mg tablet take 1 tablet by mouth once daily      omeprazole (PRILOSEC) 40 mg capsule take 1 capsule by mouth every morning before breakfast         STOP taking these medications       lisinopril-hydroCHLOROthiazide (PRINZIDE, ZESTORETIC) 20-25 mg per tablet Comments:   Reason for Stopping:         metFORMIN (GLUCOPHAGE) 1,000 mg tablet Comments:   Reason for Stopping:         escitalopram oxalate (LEXAPRO) 5 mg tablet Comments:   Reason for Stopping:         metoprolol succinate (Toprol XL) 200 mg XL tablet Comments:   Reason for Stopping:         apixaban (ELIQUIS) 5 mg tablet Comments:   Reason for Stopping:         amiodarone (PACERONE) 100 mg tablet Comments:   Reason for Stopping:         furosemide (LASIX) 20 mg tablet Comments:   Reason for Stopping:         ferrous sulfate (IRON) 325 mg (65 mg iron) EC tablet Comments:   Reason for Stopping:         Blood-Glucose Meter (OneTouch Verio IQ Meter) misc Comments:   Reason for Stopping:         glucose blood VI test strips (OneTouch Verio test strips) strip Comments:   Reason for Stopping:         glimepiride (AMARYL) 4 mg tablet Comments:   Reason for Stopping:         magnesium oxide (MAG-OX) 400 mg tablet Comments:   Reason for Stopping:             Activity: activity as tolerated    Diet: Cardiac Diet    Wound Care: As directed    Follow-up:  With liver transplant specialists at [facility] after transfer, and with PCP, Rosio Medrano MD in 7-10days    Minutes spent on discharge: >30 minutes spent coordinating this discharge (review instructions/follow-up, prescriptions, preparing report for sign off)

## 2021-02-17 LAB
ALBUMIN SERPL-MCNC: 1.9 G/DL (ref 3.4–5)
ALBUMIN/GLOB SERPL: 0.4 {RATIO} (ref 0.8–1.7)
ALP SERPL-CCNC: 184 U/L (ref 45–117)
ALT SERPL-CCNC: 448 U/L (ref 16–61)
AMMONIA PLAS-SCNC: 52 UMOL/L (ref 11–32)
ANION GAP SERPL CALC-SCNC: 5 MMOL/L (ref 3–18)
AST SERPL-CCNC: 874 U/L (ref 10–38)
BASOPHILS # BLD: 0 K/UL (ref 0–0.1)
BASOPHILS NFR BLD: 2 % (ref 0–2)
BILIRUB SERPL-MCNC: 22 MG/DL (ref 0.2–1)
BUN SERPL-MCNC: 19 MG/DL (ref 7–18)
BUN/CREAT SERPL: 20 (ref 12–20)
CALCIUM SERPL-MCNC: 8.3 MG/DL (ref 8.5–10.1)
CHLORIDE SERPL-SCNC: 101 MMOL/L (ref 100–111)
CO2 SERPL-SCNC: 27 MMOL/L (ref 21–32)
CREAT SERPL-MCNC: 0.93 MG/DL (ref 0.6–1.3)
DIFFERENTIAL METHOD BLD: ABNORMAL
EOSINOPHIL # BLD: 0 K/UL (ref 0–0.4)
EOSINOPHIL NFR BLD: 2 % (ref 0–5)
ERYTHROCYTE [DISTWIDTH] IN BLOOD BY AUTOMATED COUNT: 18.4 % (ref 11.6–14.5)
GLOBULIN SER CALC-MCNC: 5.2 G/DL (ref 2–4)
GLUCOSE BLD STRIP.AUTO-MCNC: 116 MG/DL (ref 70–110)
GLUCOSE BLD STRIP.AUTO-MCNC: 118 MG/DL (ref 70–110)
GLUCOSE BLD STRIP.AUTO-MCNC: 136 MG/DL (ref 70–110)
GLUCOSE BLD STRIP.AUTO-MCNC: 152 MG/DL (ref 70–110)
GLUCOSE SERPL-MCNC: 123 MG/DL (ref 74–99)
HCT VFR BLD AUTO: 30.4 % (ref 36–48)
HGB BLD-MCNC: 11 G/DL (ref 13–16)
LYMPHOCYTES # BLD: 0.6 K/UL (ref 0.9–3.6)
LYMPHOCYTES NFR BLD: 24 % (ref 21–52)
MAGNESIUM SERPL-MCNC: 1.9 MG/DL (ref 1.6–2.6)
MCH RBC QN AUTO: 31 PG (ref 24–34)
MCHC RBC AUTO-ENTMCNC: 36.2 G/DL (ref 31–37)
MCV RBC AUTO: 85.6 FL (ref 74–97)
MONOCYTES # BLD: 0.3 K/UL (ref 0.05–1.2)
MONOCYTES NFR BLD: 13 % (ref 3–10)
NEUTS SEG # BLD: 1.4 K/UL (ref 1.8–8)
NEUTS SEG NFR BLD: 59 % (ref 40–73)
PHOSPHATE SERPL-MCNC: 3.7 MG/DL (ref 2.5–4.9)
PLATELET # BLD AUTO: 65 K/UL (ref 135–420)
PMV BLD AUTO: 10.3 FL (ref 9.2–11.8)
POTASSIUM SERPL-SCNC: 3.6 MMOL/L (ref 3.5–5.5)
PROT SERPL-MCNC: 7.1 G/DL (ref 6.4–8.2)
RBC # BLD AUTO: 3.55 M/UL (ref 4.7–5.5)
SODIUM SERPL-SCNC: 133 MMOL/L (ref 136–145)
WBC # BLD AUTO: 2.4 K/UL (ref 4.6–13.2)

## 2021-02-17 PROCEDURE — 83735 ASSAY OF MAGNESIUM: CPT

## 2021-02-17 PROCEDURE — 92610 EVALUATE SWALLOWING FUNCTION: CPT

## 2021-02-17 PROCEDURE — 85025 COMPLETE CBC W/AUTO DIFF WBC: CPT

## 2021-02-17 PROCEDURE — 80053 COMPREHEN METABOLIC PANEL: CPT

## 2021-02-17 PROCEDURE — 2709999900 HC NON-CHARGEABLE SUPPLY

## 2021-02-17 PROCEDURE — 82140 ASSAY OF AMMONIA: CPT

## 2021-02-17 PROCEDURE — 36415 COLL VENOUS BLD VENIPUNCTURE: CPT

## 2021-02-17 PROCEDURE — 65660000000 HC RM CCU STEPDOWN

## 2021-02-17 PROCEDURE — 84100 ASSAY OF PHOSPHORUS: CPT

## 2021-02-17 PROCEDURE — 92526 ORAL FUNCTION THERAPY: CPT

## 2021-02-17 PROCEDURE — 99232 SBSQ HOSP IP/OBS MODERATE 35: CPT | Performed by: HOSPITALIST

## 2021-02-17 PROCEDURE — 74011250637 HC RX REV CODE- 250/637: Performed by: HOSPITALIST

## 2021-02-17 PROCEDURE — 74011636637 HC RX REV CODE- 636/637: Performed by: FAMILY MEDICINE

## 2021-02-17 PROCEDURE — 74011250637 HC RX REV CODE- 250/637: Performed by: FAMILY MEDICINE

## 2021-02-17 PROCEDURE — 74011250637 HC RX REV CODE- 250/637: Performed by: GENERAL PRACTICE

## 2021-02-17 PROCEDURE — 82962 GLUCOSE BLOOD TEST: CPT

## 2021-02-17 RX ADMIN — RIFAXIMIN 550 MG: 550 TABLET ORAL at 09:44

## 2021-02-17 RX ADMIN — LACTULOSE 45 ML: 20 SOLUTION ORAL at 16:37

## 2021-02-17 RX ADMIN — NADOLOL 20 MG: 40 TABLET ORAL at 09:52

## 2021-02-17 RX ADMIN — PANTOPRAZOLE SODIUM 40 MG: 40 TABLET, DELAYED RELEASE ORAL at 08:52

## 2021-02-17 RX ADMIN — Medication 10 ML: at 15:23

## 2021-02-17 RX ADMIN — Medication 10 ML: at 21:23

## 2021-02-17 RX ADMIN — LACTULOSE 45 ML: 20 SOLUTION ORAL at 21:22

## 2021-02-17 RX ADMIN — LACTULOSE 45 ML: 20 SOLUTION ORAL at 09:43

## 2021-02-17 RX ADMIN — INSULIN LISPRO 2 UNITS: 100 INJECTION, SOLUTION INTRAVENOUS; SUBCUTANEOUS at 12:38

## 2021-02-17 RX ADMIN — THERA TABS 1 TABLET: TAB at 09:44

## 2021-02-17 RX ADMIN — RIFAXIMIN 550 MG: 550 TABLET ORAL at 16:39

## 2021-02-17 RX ADMIN — Medication 10 ML: at 06:27

## 2021-02-17 RX ADMIN — FOLIC ACID 1 MG: 1 TABLET ORAL at 09:44

## 2021-02-17 NOTE — PROGRESS NOTES
[email or web address]  [phone]    Gastroenterology follow up-Progress note    Impression:  1. Decompensated cirrhosis/hepatic failure w/ superimposed acute viral hepatitis A   - MELD 34 on admission, down to ~ 27              - AFP L3% 12.9 (H)              - Trial of corticosteroid unlikely to change decompensated state although Maddrey score ~ 41 indicative of poor prognosis  2. Jaundice - due to #1, Tbili 22.0  3. Abnormal LFTs - , , , likely due to HAV  4. Ascites - mild/mod, diuretics to be started when NICK improves  5. HE with hyperammonemia - improved, now 52  6. Chronic thrombocytopenia without overt bleeding  7. ETOH abuse - continues to drink 2 to 3 alcoholic beverages daily  8. Hyponatremia  9. CAD  10. Hypotension      Plan:  1. Await transfer to [facility] - has been accepted, waiting on bed availability  2. Monitor LFTs  3. Continue nadalol, hold if HR< 60BPM  4. Continue Lactulose and Xifaxan, titrate lactulose to 2 to 3 soft BMs per day  5. Will need EGD to evaluate for varices when more stable, this can be done per [facility]  6. ETOH cessation  7. Medical management per primary team    Chief Complaint: Hepatic encephalopathy, Decompensated cirrhosis      Subjective:  Denies pain, somnolent during encounter    ROS: Denies any fevers, chills, rash.      Eyes: Scleral icterus OU   Neck: ROM normal, supple and trachea normal   Cardiovascular: heart normal, intact distal pulses, normal rate and regular rhythm   Pulmonary/Chest Wall: breath sounds normal and effort normal   Abdominal: appearance normal, bowel sounds normal and soft, non-acute, non-tender     Patient Active Problem List   Diagnosis Code    Anxiety and depression F41.9, F32.9    Alcohol abuse F10.10    H/O lumbosacral spine surgery Z98.890    Chronic low back pain with sciatica M54.40, G89.29    H/O coronary angioplasty Z98.61    Depression F32.9    Suicidal thoughts R45.851    Pancytopenia ([address] F59.462    Essential hypertension with goal blood pressure less than 130/80 I10    Type 2 diabetes mellitus with diabetic polyneuropathy, without long-term current use of insulin (HCC) E11.42    CAD (coronary artery disease) I25.10    Tachycardia R00.0    Atrial fibrillation (HCC) I48.91    Mild concentric left ventricular hypertrophy (LVH) O15.6    Diastolic dysfunction L87.94    Tricuspid regurgitation I07.1    Mitral regurgitation I34.0    Alcohol dependence (HCC) F10.20    Family history of premature CAD Z82.49    Type 2 diabetes mellitus with diabetic neuropathy (HCC) E11.40    Ascites due to alcoholic hepatitis V08.03    Liver disease due to alcohol (HCC) K70.9    Liver cirrhosis (HCC) K74.60    Hepatitis A infection B15.9         Visit Vitals  /65   Pulse 64   Temp 97.8 °F (36.6 °C)   Resp 18   Ht 5' 10\" (1.778 m)   Wt 88 kg (194 lb)   SpO2 97%   BMI 27.84 kg/m²           Intake/Output Summary (Last 24 hours) at 2/17/2021 0927  Last data filed at 2/16/2021 2147  Gross per 24 hour   Intake 610 ml   Output 650 ml   Net -40 ml       CBC w/Diff    Lab Results   Component Value Date/Time    WBC 2.4 (L) 02/17/2021 03:25 AM    RBC 3.55 (L) 02/17/2021 03:25 AM    HGB 11.0 (L) 02/17/2021 03:25 AM    HCT 30.4 (L) 02/17/2021 03:25 AM    MCV 85.6 02/17/2021 03:25 AM    MCH 31.0 02/17/2021 03:25 AM    MCHC 36.2 02/17/2021 03:25 AM    RDW 18.4 (H) 02/17/2021 03:25 AM    PLT 65 (L) 02/17/2021 03:25 AM    Lab Results   Component Value Date/Time    GRANS 59 02/17/2021 03:25 AM    LYMPH 24 02/17/2021 03:25 AM    EOS 2 02/17/2021 03:25 AM    BASOS 2 02/17/2021 03:25 AM      Basic Metabolic Profile   Recent Labs     02/17/21  0325   *   K 3.6      CO2 27   BUN 19*   CA 8.3*   MG 1.9   PHOS 3.7        Hepatic Function    Lab Results   Component Value Date/Time    ALB 1.9 (L) 02/17/2021 03:25 AM    TP 7.1 02/17/2021 03:25 AM     (H) 02/17/2021 03:25 AM    No results found for: TBIL       Coags   Recent Labs 02/16/21  0309 02/15/21  1303   PTP 20.9* 20.2*   INR 1.8* 1.8*               ALIVIA Rogers    [facility]. [email or web address]  Phone: [phone]  Pager: [phone]

## 2021-02-17 NOTE — ROUTINE PROCESS
5410 assumed care of pt after bedside verbal report was given by off going nurse, pt resting in bed quietly, no acute distress noted or signs of pain, call bed next to pt at bedside, bed alarm on, will monitor 1219 pt resting in bed quietly, no acute distress noted, pt denies pain, call bell within pt's reach [address] stated not to insert NGT after discussing it with GI due to pt being high risk for bleeding, stated that as long as pt was able to take medication and drink with out difficulty swallowing, will continue to monitor 1550 Bedside and Verbal shift change report given to NAKIA Santana (oncoming nurse) by Libertad Luther RN (offgoing nurse). Report included the following information SBAR, Kardex and MAR.

## 2021-02-17 NOTE — PROGRESS NOTES
Nutrition Assessment      Pt continues to remain lethargic/ mostly sleeping. Poor/no po intake of meals and supplements. Is awaken for a few seconds then falls asleep again. Has been able to stay awake enough to take po medications, however. Per MD, plan for feeding tube placement/ flexiflo for supplemental EN support and medication administration as needed. Has ascites; is jaundice. Noted plan for transfer to different hospital facility; awaiting placement. Received verbal from MD to place tube feeding order. Poor progression towards nutrition goals.         Nutrition Recommendations/Plan:  - Continue current diet order, HS snack, and Ensure Enlive TID. Assistance with meals as pt needs  - Continue MVI, thiamine, and folic acid.   - When feeding tube placement is verified by KUB, start feeds of Jevity 1.5 at 20 mL/hr and advancing as pt tolerates by 10 mL q 8 hours to goal rate of 60 mL/hr with water flushes of 50 mL q 4 hours.      (goal EN provides: 2160 kcal, 119 gm protein, 1093 mL free water, 1393 mL total water, 100% RDIs)  - Monitor feeds and po intake and then need for modification of tube feeding regimen          Electronically signed by Julieta Dixon RD on 2/17/2021 at 12:23 PM    Contact Number: [phone]

## 2021-02-17 NOTE — ROUTINE PROCESS
Sia from [facility] transfer center called and pt is being transferred once a bed comes available. His physician will be Dr. Daniel French. Imaging needed on disc. Dr. Abreu Bile of all the necessities needed for discharge.

## 2021-02-17 NOTE — PROGRESS NOTES
Bedside shift change report given to Rex Frye (oncoming nurse) by Joss Juarez (offgoing nurse). Report included the following information SBAR, Kardex and MAR.

## 2021-02-17 NOTE — PROGRESS NOTES
Problem: Dysphagia (Adult)  Goal: *Acute Goals and Plan of Care (Insert Text)  Description:     Patient will:  1. Tolerate PO trials with 0 s/s overt distress in 4/5 trials  2. Utilize compensatory swallow strategies/maneuvers (decrease bite/sip, size/rate, alt. liq/sol) with min cues in 4/5 trials    Rec:     Soft solid with thin liquids  Aspiration precautions  HOB >45 during po intake, remain >30 for 30-45 minutes after po   Small bites/sips; alternate liquid/solid with slow feeding rate   Oral care TID  Meds per pt preference       Outcome: Progressing Towards Goal     SPEECH LANGUAGE PATHOLOGY BEDSIDE SWALLOW EVALUATION/TREATMENT    Patient: Pablo Acharya (45 y.o. male)  Date: 2/17/2021  [facility]: [facility] [K74.60]        Precautions: aspiration  Fall  PLOF: As per H&P    ASSESSMENT :  Based on the objective data described below, the patient presents with mild oral dysphagia. Pt drowsy upon SLP arrival, oriented to person only. He demo generalized weakness and required assistance with PO. Pt tolerated reg solids and thin liquids via straw consecutive swallows with no overt s/sx of aspiration. Labored mastication secondary to generalized weakness with positive oral clearance observed during cyclic ingestion. Recommend soft solid diet with thin liquids, aspiration precautions, oral care TID, and meds as tolerated. Will continue to follow to ensure safety of PO.     TREATMENT :  Skilled therapy initiated; Educated pt on aspiration precautions and importance of compensatory swallow techniques to decrease aspiration risk (decrease rate of intake & sip/bite size, upright @HOB for all po intake and ~30 minutes after po); verbalized comprehension - suspect limited carryover. Patient will benefit from skilled intervention to address the above impairments.   Patient's rehabilitation potential is considered to be Good  Factors which may influence rehabilitation potential include:   [] None noted  [x]            Mental ability/status  []            Medical condition  []            Home/family situation and support systems  [x]            Safety awareness  []            Pain tolerance/management  []            Other:      PLAN :  Recommendations and Planned Interventions: See above  Frequency/Duration: Patient will be followed by speech-language pathology 1-2 times per day/4-7 days per week to address goals. Discharge Recommendations: Karlos Lerner and To Be Determined     SUBJECTIVE:   Patient stated Miguel Durham me the cracker. OBJECTIVE:     Past Medical History:   Diagnosis Date    A-fib ([facility]     Alcohol abuse 8/22/2013    CAD (coronary artery disease)     Non-obstructive LAD by cath (2016)    Depression     DMII (diabetes mellitus, type 2) ([facility]     Hypertension     [facility]     Suicidal thoughts     Umbilical hernia      Past Surgical History:   Procedure Laterality Date    HX BACK SURGERY  2014    HX CORONARY STENT PLACEMENT  2012    No stent placed     Prior Level of Function/Home Situation: see below  Home Situation  Home Environment: Trailer/mobile home  # Steps to Enter: 5  One/Two Story Residence: One story  Living Alone: No  Support Systems: Family member(s)  Patient Expects to be Discharged to[de-identified] Trailer/mobile home  Current DME Used/Available at Home: CPAP    Diet prior to admission: regular solid with thin  Current Diet:  soft solid with thin      Cognitive and Communication Status:  Neurologic State: Drowsy  Orientation Level: Oriented to person, Disoriented to time, Disoriented to situation, Disoriented to place  Cognition: Follows commands  Safety/Judgement: Fall prevention  Oral Assessment:  Oral Assessment  Labial: No impairment  Dentition: Natural  Oral Hygiene: adequate  Lingual: Decreased rate;Decreased strength  Velum: No impairment  Mandible: No impairment  P.O.  Trials:  Patient Position: [facility] at [facility]  Vocal quality prior to P.O.: No impairment  Consistency Presented: Thin liquid; Solid  How Presented: SLP-fed/presented;Spoon;Straw  Bolus Acceptance: No impairment  Bolus Formation/Control: Impaired  Type of Impairment: Delayed;Mastication  Propulsion: No impairment  Oral Residue: None  Initiation of Swallow: No impairment  Laryngeal Elevation: Functional  Aspiration Signs/Symptoms: None  Pharyngeal Phase Characteristics: No impairment, issues, or problems   Effective Modifications: Spoon; Alternate liquids/solids  Cues for Modifications: Moderate     Oral Phase Severity: Mild  Pharyngeal Phase Severity : No impairment    PAIN:  Start of Eval: 0  End of Eval: 0     After treatment:   []            Patient left in no apparent distress sitting up in chair  [x]            Patient left in no apparent distress in bed  [x]            Call bell left within reach  [x]            Nursing notified  []            Family present  []            Caregiver present  []            Bed alarm activated    COMMUNICATION/EDUCATION:   [x]            Aspiration precautions; swallow safety; compensatory techniques. [x]            Patient/family have participated as able in goal setting and plan of care. []            Patient/family agree to work toward stated goals and plan of care. []            Patient understands intent and goals of therapy; neutral about participation. []            Patient unable to participate in goal setting/plan of care; educ ongoing with interdisciplinary staff  [x]         Posted safety precautions in patient's room.     Thank you for this referral.    Kim Morrissey M.S. CCC-SLP/L  Speech-Language Pathologist

## 2021-02-17 NOTE — PROGRESS NOTES
Nutrition Assessment      Pt seen by SLP; diet consistency downgraded. Per RN and MD report, plan is to hold off on feeding tube placement now due to pt with varices. Pt able to take his medications, able to take po but needs constant arousing and encouragement. Per RN, pt drinks more than he eats. Discussed with nursing about giving pt Ensure drinks with medications (that can be taken with food) and to encourage intake of Ensure if po fluid intake is overall good. Nutrition Recommendation/Plan:  - Discontinue tube feeding order given that plan now is to hold off on feeding tube insertion.   - Continue current diet order, HS snack, and Ensure Enlive TID. Assistance with meals as pt needs  - Continue MVI, thiamine, and folic acid  - Encourage po intake of meals and supplements  - If po intake does not improve and plan is to place feeding tube, consider starting tube feeding of Jevity 1.5 at 20 mL/hr and advancing as pt tolerates by 10 mL q 8 hours to goal rate of 60 mL/hr with water flushes of 50 mL q 4 hours.  - MD to address as appropriate.       (goal EN provides: 2160 kcal, 119 gm protein, 1093 mL free water, 1393 mL total water, 100% RDIs)                Electronically signed by Richard Figueredo RD on 2/17/2021 at 4:58 PM    Contact Number: [phone]

## 2021-02-17 NOTE — PROGRESS NOTES
Progress Note    Patient: Janessa Stephenson MRN: [id number]  SSN: [id number]    YOB: 1960  Age: 62 y.o. Sex: male      Admit Date: 2/12/2021    LOS: 5 days     Subjective:     Remains very sleepy today. Responds to voice and opens eyes on command, but minimally responsive when asked more detailed than yes or no questions about how he feels. Denies acute complaints or pain currently. Per nursing reports, he has been tolerating p.o. intake and swallowing medications adequately. Objective:     Vitals:    02/16/21 2030 02/17/21 0008 02/17/21 0428 02/17/21 0755   BP: 100/65 107/71 102/69 100/65   Pulse: 61 67 64 64   Resp: 17 18 18 18   Temp: 97.8 °F (36.6 °C) 97.9 °F (36.6 °C) 97.3 °F (36.3 °C) 97.8 °F (36.6 °C)   SpO2: 97% 99% 99% 97%   Weight:   88 kg (194 lb)    Height:            Intake and Output:  Current Shift: No intake/output data recorded. Last three shifts: 02/15 1901 - 02/17 0700  In: 56 [P.O.:610]  Out: 650 [Urine:650]    Physical Exam:   GENERAL: fatigued, cooperative, slowed mentation, appears stated age, confused  EYE: positive findings: sclera icterus b/l  LUNG: clear to auscultation bilaterally  HEART: regular rate and rhythm, S1, S2 normal, no murmur, click, rub or gallop  ABDOMEN: Distended, nontender throughout, no appreciable fluid wave.  No caput medusae or spider angiomata appreciated.   EXTREMITIES:  extremities normal, atraumatic, no cyanosis or edema  SKIN: Global jaundice noted  NEUROLOGIC: positive findings: confused, unable to determine presence or absence of asterixis due to confusion and somnolence during exam.    Lab/Data Review:  Labs: Results:       Chemistry Recent Labs     02/17/21  0325 02/16/21  0309 02/15/21  0323   * 109* 89   * 132* 133*   K 3.6 3.8 3.5    101 102   CO2 27 25 24   BUN 19* 18 18   CREA 0.93 0.79 0.77   CA 8.3* 7.8* 7.5*   AGAP 5 6 7   BUCR 20 23* 23*   * 177* 179*   TP 7.1 7.0 7.2   ALB 1.9* 1.9* 2.0*   GLOB 5.2* 5. 1* 5.2*   AGRAT 0.4* 0.4* 0.4*      CBC w/Diff Recent Labs     02/17/21  0325   WBC 2.4*   RBC 3.55*   HGB 11.0*   HCT 30.4*   PLT 65*   GRANS 59   LYMPH 24   EOS 2      Cardiac Enzymes No results for input(s): CPK, CKND1, KIERRA in the last 72 hours. No lab exists for component: CKRMB, TROIP   Coagulation Recent Labs     02/16/21  0309 02/15/21  1303   PTP 20.9* 20.2*   INR 1.8* 1.8*       Lipid Panel Lab Results   Component Value Date/Time    Cholesterol, total 205 (H) 07/10/2019 01:48 PM    HDL Cholesterol 55 07/10/2019 01:48 PM    LDL, calculated 119.8 (H) 07/10/2019 01:48 PM    VLDL, calculated 30.2 07/10/2019 01:48 PM    Triglyceride 151 (H) 07/10/2019 01:48 PM    CHOL/HDL Ratio 3.7 07/10/2019 01:48 PM      BNP No results for input(s): BNPP in the last 72 hours. Liver Enzymes Recent Labs     02/17/21  0325   TP 7.1   ALB 1.9*   *      Thyroid Studies Lab Results   Component Value Date/Time    TSH 1.460 04/05/2019 03:23 PM          No results found for: SDES Lab Results   Component Value Date/Time    Culture result: ENTEROCOCCUS SPECIES (A) 02/13/2021 03:52 AM    Culture result: NO GROWTH 4 DAYS 02/12/2021 11:15 AM    Culture result: NO GROWTH 4 DAYS 02/12/2021 11:05 AM        Imaging:     Ct Abd Pelv Wo Cont    Result Date: 2/12/2021  1. Nodular hepatic contour suggesting underlying cirrhosis. Discrete mass or biliary dilation not appreciated. 2. Splenomegaly. Gastrohepatic varices. Recanalized umbilical vein. Mesenteric edema and abdominal and pelvic ascites. Findings consistent with portal hypertension. 3. No bowel distention to suggest obstruction. [address]    Result Date: 2/15/2021  Small volume perihepatic ascites, not sufficient for paracentesis. No paracentesis was performed. [address]    Result Date: 2/12/2021  1. Findings consistent with hepatocellular disease. 2.  Mild ascites. 3.  Cholelithiasis but no distinct evidence to suggest acute cholecystitis.     Xr Chest Port    Result Date: 2/13/2021  No acute cardiopulmonary process. Assessment:     Decompensated cirrhosis with transaminitis:  MELD-Na 27 today, stable (uptrending during admission); GI following             - Hepatic encephalopathy  stable today; remains severe despite downtrending ammonia             -Accepted for transfer to [facility], waiting on bed availability. I will reach out to [facility] to try to assess timeline of  pending transfer. Appreciate their assistance. Acute Hep A Hep A IgM positive.               - HCV serology negative  Hyperbilirubinemia with non-obstructive jaundice:  Increase to 22 today from 20 yesterday  Hepatic encephalopathy:  Ammonia 52 <-- 90 <-- 103; asterixis and sleep disturbances. Acute Hyponatremia- 133 today, stable  Hypokalemia- stable, improved since admission  NICK- resolved  Pancytopenia: unchanged since 2018  Coagulopathy with elevated INR due to cirrhosis:  INR 1.8  Dm type 2:  Hb A1c 6.4; on SSI and accuchecks. POC glucoses well controlled on insulin lispro without basal insulin.      Hx ETOH abuse:  BAL upon admission <3.  No clinical EtOH w/d. A.fib:  Currently rate controlled; dx 8/2020 due to ETOH withdrawal; followed by Dr. Tamara Canada; was on Eliquis and toprol  Non-obstructive CAD:  Cath in 2011     Plan:     Continue lactulose, rifaximin as scheduled for treatment of hepatic encephalopathy. Will place NG tube today to supplement nutrition. Continue to hold home apixaban and defer chemical DVT prophylaxis in the setting of hepatic coagulopathy. EGD indicated to assess for esophageal varices, defer this to [facility] liver transplant service after transfer. Continue nadolol, Protonix as scheduled      Signed By: Nayeli Cabrera MD     February 17, 2021      [facility] medical dictation software was used for portions of this report. Unintended errors may occur.

## 2021-02-17 NOTE — PROGRESS NOTES
conducted a Follow up consultation and Spiritual Assessment for [facility], who is a 62 y.o.,male. The  provided the following Interventions:  Continued the relationship of care and support. Patient likes to be called \"Juan\". Assisted patient with lunch tray. Offered prayer and assurance of continued prayer on patients behalf. Chart reviewed. The following outcomes were achieved:  Patient prefers to be called \"Juan\". Patient ate a few bites of lunch. Patient responded a little to  during visit. Assessment:  There are no known further spiritual or Rastafarian issues which require Spiritual Care Services interventions at this time. Plan:  Chaplains will continue to follow and will provide pastoral care on an as needed/requested basis.  recommends bedside caregivers page  on duty if patient shows signs of acute spiritual or emotional distress.        [address] [place] [address]   [phone]

## 2021-02-18 LAB
ALBUMIN SERPL-MCNC: 1.8 G/DL (ref 3.4–5)
ALBUMIN/GLOB SERPL: 0.3 {RATIO} (ref 0.8–1.7)
ALP SERPL-CCNC: 184 U/L (ref 45–117)
ALT SERPL-CCNC: 469 U/L (ref 16–61)
AMMONIA PLAS-SCNC: 71 UMOL/L (ref 11–32)
ANION GAP SERPL CALC-SCNC: 9 MMOL/L (ref 3–18)
AST SERPL-CCNC: 987 U/L (ref 10–38)
BASOPHILS # BLD: 0 K/UL (ref 0–0.06)
BASOPHILS NFR BLD: 0 % (ref 0–3)
BILIRUB SERPL-MCNC: 23.9 MG/DL (ref 0.2–1)
BUN SERPL-MCNC: 23 MG/DL (ref 7–18)
BUN/CREAT SERPL: 20 (ref 12–20)
CALCIUM SERPL-MCNC: 8.2 MG/DL (ref 8.5–10.1)
CHLORIDE SERPL-SCNC: 102 MMOL/L (ref 100–111)
CO2 SERPL-SCNC: 23 MMOL/L (ref 21–32)
CREAT SERPL-MCNC: 1.14 MG/DL (ref 0.6–1.3)
DIFFERENTIAL METHOD BLD: ABNORMAL
EOSINOPHIL # BLD: 0.1 K/UL (ref 0–0.4)
EOSINOPHIL NFR BLD: 2 % (ref 0–5)
ERYTHROCYTE [DISTWIDTH] IN BLOOD BY AUTOMATED COUNT: 19.1 % (ref 11.6–14.5)
GLOBULIN SER CALC-MCNC: 5.5 G/DL (ref 2–4)
GLUCOSE BLD STRIP.AUTO-MCNC: 117 MG/DL (ref 70–110)
GLUCOSE BLD STRIP.AUTO-MCNC: 132 MG/DL (ref 70–110)
GLUCOSE BLD STRIP.AUTO-MCNC: 190 MG/DL (ref 70–110)
GLUCOSE BLD STRIP.AUTO-MCNC: 195 MG/DL (ref 70–110)
GLUCOSE SERPL-MCNC: 121 MG/DL (ref 74–99)
HCT VFR BLD AUTO: 31.4 % (ref 36–48)
HGB BLD-MCNC: 11.3 G/DL (ref 13–16)
LYMPHOCYTES # BLD: 0.5 K/UL (ref 0.8–3.5)
LYMPHOCYTES NFR BLD: 18 % (ref 20–51)
MCH RBC QN AUTO: 30.9 PG (ref 24–34)
MCHC RBC AUTO-ENTMCNC: 36 G/DL (ref 31–37)
MCV RBC AUTO: 85.8 FL (ref 74–97)
MONOCYTES # BLD: 0.2 K/UL (ref 0–1)
MONOCYTES NFR BLD: 7 % (ref 2–9)
NEUTS BAND NFR BLD MANUAL: 4 % (ref 0–5)
NEUTS SEG # BLD: 1.9 K/UL (ref 1.8–8)
NEUTS SEG NFR BLD: 69 % (ref 42–75)
PLATELET # BLD AUTO: 65 K/UL (ref 135–420)
PLATELET COMMENTS,PCOM: ABNORMAL
PMV BLD AUTO: 10.5 FL (ref 9.2–11.8)
POTASSIUM SERPL-SCNC: 3.6 MMOL/L (ref 3.5–5.5)
PROT SERPL-MCNC: 7.3 G/DL (ref 6.4–8.2)
RBC # BLD AUTO: 3.66 M/UL (ref 4.7–5.5)
RBC MORPH BLD: ABNORMAL
SODIUM SERPL-SCNC: 134 MMOL/L (ref 136–145)
WBC # BLD AUTO: 2.7 K/UL (ref 4.6–13.2)

## 2021-02-18 PROCEDURE — 92526 ORAL FUNCTION THERAPY: CPT

## 2021-02-18 PROCEDURE — 74011250637 HC RX REV CODE- 250/637: Performed by: GENERAL PRACTICE

## 2021-02-18 PROCEDURE — 74011636637 HC RX REV CODE- 636/637: Performed by: FAMILY MEDICINE

## 2021-02-18 PROCEDURE — 97116 GAIT TRAINING THERAPY: CPT

## 2021-02-18 PROCEDURE — 74011250637 HC RX REV CODE- 250/637: Performed by: HOSPITALIST

## 2021-02-18 PROCEDURE — 65660000000 HC RM CCU STEPDOWN

## 2021-02-18 PROCEDURE — 36415 COLL VENOUS BLD VENIPUNCTURE: CPT

## 2021-02-18 PROCEDURE — 2709999900 HC NON-CHARGEABLE SUPPLY

## 2021-02-18 PROCEDURE — 82140 ASSAY OF AMMONIA: CPT

## 2021-02-18 PROCEDURE — 97110 THERAPEUTIC EXERCISES: CPT

## 2021-02-18 PROCEDURE — 97530 THERAPEUTIC ACTIVITIES: CPT

## 2021-02-18 PROCEDURE — 74011250637 HC RX REV CODE- 250/637: Performed by: FAMILY MEDICINE

## 2021-02-18 PROCEDURE — 74011250637 HC RX REV CODE- 250/637: Performed by: INTERNAL MEDICINE

## 2021-02-18 PROCEDURE — 80053 COMPREHEN METABOLIC PANEL: CPT

## 2021-02-18 PROCEDURE — 82962 GLUCOSE BLOOD TEST: CPT

## 2021-02-18 PROCEDURE — 85025 COMPLETE CBC W/AUTO DIFF WBC: CPT

## 2021-02-18 PROCEDURE — 77030040831 HC BAG URINE DRNG MDII -A

## 2021-02-18 PROCEDURE — 99232 SBSQ HOSP IP/OBS MODERATE 35: CPT | Performed by: HOSPITALIST

## 2021-02-18 RX ADMIN — LACTULOSE 45 ML: 20 SOLUTION ORAL at 20:44

## 2021-02-18 RX ADMIN — PROMETHAZINE HYDROCHLORIDE 12.5 MG: 25 TABLET ORAL at 20:44

## 2021-02-18 RX ADMIN — Medication 10 ML: at 21:11

## 2021-02-18 RX ADMIN — INSULIN LISPRO 2 UNITS: 100 INJECTION, SOLUTION INTRAVENOUS; SUBCUTANEOUS at 17:00

## 2021-02-18 RX ADMIN — Medication 10 ML: at 06:28

## 2021-02-18 RX ADMIN — FOLIC ACID 1 MG: 1 TABLET ORAL at 08:52

## 2021-02-18 RX ADMIN — LACTULOSE 45 ML: 20 SOLUTION ORAL at 08:52

## 2021-02-18 RX ADMIN — RIFAXIMIN 550 MG: 550 TABLET ORAL at 08:52

## 2021-02-18 RX ADMIN — LACTULOSE 45 ML: 20 SOLUTION ORAL at 17:00

## 2021-02-18 RX ADMIN — QUETIAPINE FUMARATE 600 MG: 300 TABLET, EXTENDED RELEASE ORAL at 20:44

## 2021-02-18 RX ADMIN — PANTOPRAZOLE SODIUM 40 MG: 40 TABLET, DELAYED RELEASE ORAL at 08:52

## 2021-02-18 RX ADMIN — LORAZEPAM 2 MG: 1 TABLET ORAL at 22:03

## 2021-02-18 RX ADMIN — RIFAXIMIN 550 MG: 550 TABLET ORAL at 17:00

## 2021-02-18 RX ADMIN — LORAZEPAM 2 MG: 1 TABLET ORAL at 20:44

## 2021-02-18 RX ADMIN — THERA TABS 1 TABLET: TAB at 08:52

## 2021-02-18 RX ADMIN — Medication 10 ML: at 17:04

## 2021-02-18 RX ADMIN — INSULIN LISPRO 2 UNITS: 100 INJECTION, SOLUTION INTRAVENOUS; SUBCUTANEOUS at 11:54

## 2021-02-18 NOTE — PROGRESS NOTES
Mew continue to have low B/P 98/63 Dr Willy Naidu aware the low blood pressure will contnue to monitor vitals and LOC

## 2021-02-18 NOTE — PROGRESS NOTES
Problem: Mobility Impaired (Adult and Pediatric)  Goal: *Acute Goals and Plan of Care (Insert Text)  Description: Physical Therapy Goals  Initiated 2/14/2021 and to be accomplished within 7 day(s)  1. Patient will move from supine to sit and sit to supine , scoot up and down, and roll side to side in bed with modified independence. 2.  Patient will transfer from bed to chair and chair to bed with modified independence using the least restrictive device. 3.  Patient will perform sit to stand with modified independence. 4.  Patient will ambulate with modified independence for 150 feet with the least restrictive device. 5.  Patient will ascend/descend 0-5 stairs or marching in place with 1-2 handrail(s) with supervision/set-up. Prior Level of Function:   Patient was independence for all mobility including gait using no AD. Patient lives alone in a single story home. Outcome: Progressing Towards Goal  PHYSICAL THERAPY TREATMENT    Patient: Idalia Landry (48 y.o. male)  Date: 2/18/2021  Diagnosis: Liver cirrhosis ([facility] [K74.60] Liver cirrhosis ([facility]       Precautions: Fall  PLOF: see above    ASSESSMENT:  Pt was cooperative, noted to have fluctuating confusion at times requiring hand over hand to follow commands and other times only requiring verbal cues. Pt performed bed mobility with supervision and standing transfers with CGA with verbal and tactile cues for proper hand placement. Pt ambulated 24 feet with RW and CGA for balance and min A for walker management. Pt then performed multiple sit to stand transfers from the recliner with CGA. Pt performed seated LE exercises with cues for remaining on task. Pt was left with needs in reach, chair alarm set and nursing student present.     Progression toward goals:   []      Improving appropriately and progressing toward goals  [x]      Improving slowly and progressing toward goals  []      Not making progress toward goals and plan of care will be adjusted     PLAN:  Patient continues to benefit from skilled intervention to address the above impairments. Continue treatment per established plan of care. Discharge Recommendations:  Rehab  Further Equipment Recommendations for Discharge:  N/A     SUBJECTIVE:   Patient stated I'm going to try.     OBJECTIVE DATA SUMMARY:   Critical Behavior:  Neurologic State: Drowsy  Orientation Level: Oriented to person  Cognition: Follows commands  Safety/Judgement: Fall prevention  Functional Mobility Training:  Bed Mobility:  Supine to Sit: Supervision  Transfers:  Sit to Stand: Contact guard assistance  Stand to Sit: Contact guard assistance  Balance:  Sitting: Intact  Standing: With support  Standing - Static: Fair  Standing - Dynamic : (fair-)   Ambulation/Gait Training:  Distance (ft): 24 Feet (ft)  Assistive Device: Walker, rolling  Ambulation - Level of Assistance: Contact guard assistance;Minimal assistance  Gait Abnormalities: Decreased step clearance  Base of Support: Widened  Step Length: Right shortened;Left shortened  Therapeutic Exercises:         EXERCISE   Sets   Reps   Active Active Assist   Passive Self ROM   Comments   Ankle Pumps 1 10  [x] [] [] []    Quad Sets/Glut Sets    [] [] [] [] Hold for 5 secs   Hamstring Sets   [] [] [] []    Short Arc Quads   [] [] [] []    Heel Slides   [] [] [] []    Straight Leg Raises   [] [] [] []    Hip Abd/Add with knees extended 1 10 [x] [] [] []    Long Arc Quads 1 10 [x] [] [] []    Seated Marching 1 10 [x] [] [] []    Standing Marching   [] [] [] []       [] [] [] []        Pain:  Pain level pre-treatment: 0/10  Pain level post-treatment: 0/10   Pain Intervention(s): n/a    Activity Tolerance:   poor  Please refer to the flowsheet for vital signs taken during this treatment.   After treatment:   [x] Patient left in no apparent distress sitting up in chair  [] Patient left in no apparent distress in bed  [x] Call bell left within reach  [x] Nursing student present  [] Caregiver present  [x] Chair alarm activated  [] SCDs applied      COMMUNICATION/EDUCATION:   [x]         Role of Physical Therapy in the acute care setting. [x]         Fall prevention education was provided and the patient/caregiver indicated understanding. [x]         Patient/family have participated as able in working toward goals and plan of care. [x]         Patient/family agree to work toward stated goals and plan of care. []         Patient understands intent and goals of therapy, but is neutral about his/her participation.   []         Patient is unable to participate in stated goals/plan of care: ongoing with therapy staff.  []         Other:        Karthik Thomson, PT   Time Calculation: 38 mins

## 2021-02-18 NOTE — PROGRESS NOTES
Progress Note    Patient: Carolyn Joshi MRN: [id number]  SSN: [id number]    YOB: 1960  Age: 62 y.o. Sex: male      Admit Date: 2/12/2021    LOS: 6 days     Subjective:     No acute complaints today; tolerated breakfast today without nausea vomiting. Denies acute pain. Denies worsening abdominal distention. Objective:     Vitals:    02/17/21 2000 02/18/21 0000 02/18/21 0400 02/18/21 0737   BP: 98/61 98/60 103/62 96/63   Pulse: (!) 58 (!) 57 (!) 56 (!) 57   Resp: 18 18 19 20   Temp: 98.2 °F (36.8 °C) 97.9 °F (36.6 °C) 97.5 °F (36.4 °C) 98.5 °F (36.9 °C)   SpO2: 98% 98% 98% 99%   Weight:       Height:            Intake and Output:  Current Shift: 02/18 0701 - 02/18 1900  In: 120 [P.O.:120]  Out: -   Last three shifts: 02/16 1901 - 02/18 0700  In: 120 [P.O.:120]  Out: 1200 [Urine:1200]    Physical Exam:   GENERAL: fatigued, cooperative, slowed mentation, appears stated age, confused  EYE: positive findings: sclera icterus b/l  LUNG: clear to auscultation bilaterally  HEART: regular rate and rhythm, S1, S2 normal, no murmur, click, rub or gallop  ABDOMEN: Distended, nontender throughout, no appreciable fluid wave.  No caput medusae or spider angiomata appreciated. EXTREMITIES:  extremities normal, atraumatic, no cyanosis or edema  SKIN: Global jaundice noted  NEUROLOGIC: positive findings: confused, somnolent. Positive asterixis.     Lab/Data Review:  Labs: Results:       Chemistry Recent Labs     02/18/21  0300 02/17/21  0325 02/16/21  0309   * 123* 109*   * 133* 132*   K 3.6 3.6 3.8    101 101   CO2 23 27 25   BUN 23* 19* 18   CREA 1.14 0.93 0.79   CA 8.2* 8.3* 7.8*   AGAP 9 5 6   BUCR 20 20 23*   * 184* 177*   TP 7.3 7.1 7.0   ALB 1.8* 1.9* 1.9*   GLOB 5.5* 5.2* 5.1*   AGRAT 0.3* 0.4* 0.4*      CBC w/Diff Recent Labs     02/18/21  0300 02/17/21  0325   WBC 2.7* 2.4*   RBC 3.66* 3.55*   HGB 11.3* 11.0*   HCT 31.4* 30.4*   PLT 65* 65*   GRANS 69 59   LYMPH 18* 24 EOS 2 2      Cardiac Enzymes No results for input(s): CPK, CKND1, KIERRA in the last 72 hours. No lab exists for component: CKRMB, TROIP   Coagulation Recent Labs     02/16/21  0309 02/15/21  1303   PTP 20.9* 20.2*   INR 1.8* 1.8*       Lipid Panel Lab Results   Component Value Date/Time    Cholesterol, total 205 (H) 07/10/2019 01:48 PM    HDL Cholesterol 55 07/10/2019 01:48 PM    LDL, calculated 119.8 (H) 07/10/2019 01:48 PM    VLDL, calculated 30.2 07/10/2019 01:48 PM    Triglyceride 151 (H) 07/10/2019 01:48 PM    CHOL/HDL Ratio 3.7 07/10/2019 01:48 PM      BNP No results for input(s): BNPP in the last 72 hours. Liver Enzymes Recent Labs     02/18/21  0300   TP 7.3   ALB 1.8*   *      Thyroid Studies Lab Results   Component Value Date/Time    TSH 1.460 04/05/2019 03:23 PM          No results found for: SDES Lab Results   Component Value Date/Time    Culture result: ENTEROCOCCUS SPECIES (A) 02/13/2021 03:52 AM    Culture result: NO GROWTH 5 DAYS 02/12/2021 11:15 AM    Culture result: NO GROWTH 5 DAYS 02/12/2021 11:05 AM        Imaging:     Ct Abd Pelv Wo Cont    Result Date: 2/12/2021  1. Nodular hepatic contour suggesting underlying cirrhosis. Discrete mass or biliary dilation not appreciated. 2. Splenomegaly. Gastrohepatic varices. Recanalized umbilical vein. Mesenteric edema and abdominal and pelvic ascites. Findings consistent with portal hypertension. 3. No bowel distention to suggest obstruction. [id number] [facility] [employer]    Result Date: 2/15/2021  Small volume perihepatic ascites, not sufficient for paracentesis. No paracentesis was performed. [id number] [facility]    Result Date: 2/12/2021  1. Findings consistent with hepatocellular disease. 2.  Mild ascites. 3.  Cholelithiasis but no distinct evidence to suggest acute cholecystitis. Xr Chest Port    Result Date: 2/13/2021  No acute cardiopulmonary process.       Assessment:     Decompensated cirrhosis with transaminitis:  MELD-Na 27, uptrending during admission; GI following             -Hepatic encephalopathy  stable today; remains severe despite downtrending ammonia             -Accepted for transfer to [facility], waiting on bed availability. Acute Hep A Hep A IgM positive.               - HCV serology negative  Hyperbilirubinemia with non-obstructive jaundice:  Increase to 24 today from 22 yesterday  Hepatic encephalopathy:  Ammonia 71 <- 52 <-- 90 <-- 103; asterixis and sleep disturbances. Acute Hyponatremia- 134 today, slightly improved  Hypokalemia- stable, improved since admission  NICK- resolved  Pancytopenia: unchanged since 2018  Coagulopathy with elevated INR due to cirrhosis:  INR 1.8  Dm type 2:  Hb A1c 6.4; on SSI and accuchecks. POC glucoses well controlled on insulin lispro without basal insulin.      Hx ETOH abuse:  BAL upon admission <3.  No clinical EtOH w/d. A.fib:  Currently rate controlled; dx 8/2020 due to ETOH withdrawal; followed by Dr. Sandra Hankins; was on Eliquis and toprol  Non-obstructive CAD:  Cath in 2011     Plan:     Continue lactulose, rifaximin as scheduled for treatment of hepatic encephalopathy. Defer NG tube for now given that he is tolerating p.o. intake; also has history of esophageal varices. Continue to hold home apixaban and defer chemical DVT prophylaxis in the setting of hepatic coagulopathy. EGD indicated to assess for esophageal varices, defer this to [facility] liver transplant service after transfer. Continue nadolol, Protonix as scheduled      Signed By: Jefferson Jay MD     February 18, 2021      [facility] medical dictation software was used for portions of this report. Unintended errors may occur.

## 2021-02-18 NOTE — PROGRESS NOTES
[email or web address]  [phone]    Gastroenterology follow up-Progress note    Impression:  1. Decompensated cirrhosis/hepatic failure w/ superimposed acute viral hepatitis A              - MELD 34 on admission, down to ~ 27              - AFP L3% 12.9 (H)              - Trial of corticosteroid unlikely to change decompensated state although Maddrey score ~ 41 indicative of poor prognosis  2. Jaundice - due to #1, Tbili 23.9  3. Abnormal LFTs -  likely due to HAV infection  4. Ascites - mild/mod, diuretics to be started when NICK improves  5. HE with hyperammonemia - improved, now 52  6. Chronic thrombocytopenia without overt bleeding  7. ETOH abuse - continues to drink 2 to 3 alcoholic beverages daily  8. Hyponatremia  9. CAD  10. Hypotension    Plan:  1. Await transfer to [facility] - has been accepted, waiting on bed availability  2. Monitor LFTs  3. Continue nadalol, hold if HR< 60BPM  4. Continue Lactulose and Xifaxan, titrate lactulose to 2 to 3 soft BMs per day  5. Monitor PO intake, diet per speech, will want to avoid placement of NGT if possible due to bleed risk  6. Paracentesis as needed, may need prior to transfer to [facility], monitor abdomen  7. Will need EGD to evaluate for varices when more stable, this can be done per [facility] GI  8. ETOH cessation  9.  Medical management per primary team    Chief Complaint: Hepatic encephalopathy, Decompensated cirrhosis      Subjective:  Still somnolent, answers questions with arousal, denies abdominal pain      Eyes: Scleral icterus   Neck: ROM normal, supple and trachea normal   Cardiovascular: heart normal, intact distal pulses, normal rate and regular rhythm   Pulmonary/Chest Wall: breath sounds normal and effort normal   Abdominal: Abdominal distention, non-acute, non-tender     Patient Active Problem List   Diagnosis Code    Anxiety and depression F41.9, F32.9    Alcohol abuse F10.10    H/O lumbosacral spine surgery Z98.890    Chronic low back pain with sciatica M54.40, G89.29    H/O coronary angioplasty Z98.61    Depression F32.9    Suicidal thoughts R45.851    Pancytopenia ([facility] G31.383    Essential hypertension with goal blood pressure less than 130/80 I10    Type 2 diabetes mellitus with diabetic polyneuropathy, without long-term current use of insulin (HCC) E11.42    CAD (coronary artery disease) I25.10    Tachycardia R00.0    Atrial fibrillation (HCC) I48.91    Mild concentric left ventricular hypertrophy (LVH) D68.4    Diastolic dysfunction F28.72    Tricuspid regurgitation I07.1    Mitral regurgitation I34.0    Alcohol dependence ([facility] F10.20    Family history of premature CAD Z82.49    Type 2 diabetes mellitus with diabetic neuropathy (HCC) E11.40    Ascites due to alcoholic hepatitis A94.21    Liver disease due to alcohol (HCC) K70.9    Liver cirrhosis (HCC) K74.60    Hepatitis A infection B15.9         Visit Vitals  BP 96/63 (BP 1 Location: Right upper arm, BP Patient Position: Supine)   Pulse (!) 57   Temp 98.5 °F (36.9 °C)   Resp 20   Ht 5' 10\" (1.778 m)   Wt 88 kg (194 lb)   SpO2 99%   BMI 27.84 kg/m²           Intake/Output Summary (Last 24 hours) at 2/18/2021 0948  Last data filed at 2/18/2021 0826  Gross per 24 hour   Intake 860 ml   Output 550 ml   Net 310 ml       CBC w/Diff    Lab Results   Component Value Date/Time    WBC 2.7 (L) 02/18/2021 03:00 AM    RBC 3.66 (L) 02/18/2021 03:00 AM    HGB 11.3 (L) 02/18/2021 03:00 AM    HCT 31.4 (L) 02/18/2021 03:00 AM    MCV 85.8 02/18/2021 03:00 AM    MCH 30.9 02/18/2021 03:00 AM    MCHC 36.0 02/18/2021 03:00 AM    RDW 19.1 (H) 02/18/2021 03:00 AM    PLT 65 (L) 02/18/2021 03:00 AM    Lab Results   Component Value Date/Time    GRANS 69 02/18/2021 03:00 AM    LYMPH 18 (L) 02/18/2021 03:00 AM    EOS 2 02/18/2021 03:00 AM    BANDS 4 02/18/2021 03:00 AM    BASOS 0 02/18/2021 03:00 AM      Basic Metabolic Profile   Recent Labs     02/18/21  0300 02/17/21  0325   * 133*   K 3.6 3.6    101   CO2 23 27   BUN 23* 19*   CA 8.2* 8.3*   MG  --  1.9   PHOS  --  3.7        Hepatic Function    Lab Results   Component Value Date/Time    ALB 1.8 (L) 02/18/2021 03:00 AM    TP 7.3 02/18/2021 03:00 AM     (H) 02/18/2021 03:00 AM    No results found for: TBIL       Coags   Recent Labs     02/16/21  0309 02/15/21  1303   PTP 20.9* 20.2*   INR 1.8* 1.8*               ALIVIA Rogers    [facility]. [email or web address]  Phone: [phone]  Pager: [phone]

## 2021-02-18 NOTE — PROGRESS NOTES
Problem: Risk for Spread of Infection  Goal: Prevent transmission of infectious organism to others  Description: Prevent the transmission of infectious organisms to other patients, staff members, and visitors. Outcome: Progressing Towards Goal     Problem: Falls - Risk of  Goal: *Absence of Falls  Description: Document Scott Flow Fall Risk and appropriate interventions in the flowsheet. Outcome: Progressing Towards Goal  Note: Fall Risk Interventions:  Mobility Interventions: Assess mobility with egress test, Bed/chair exit alarm, OT consult for ADLs, Patient to call before getting OOB, PT Consult for mobility concerns, PT Consult for assist device competence, Strengthening exercises (ROM-active/passive)    Mentation Interventions: Adequate sleep, hydration, pain control, Bed/chair exit alarm, Room close to nurse's station, More frequent rounding    Medication Interventions: Patient to call before getting OOB, Teach patient to arise slowly    Elimination Interventions: Bed/chair exit alarm, Call light in reach, Patient to call for help with toileting needs    History of Falls Interventions: Bed/chair exit alarm         Problem: Alcohol Withdrawal  Goal: *STG: Participates in treatment plan  Outcome: Progressing Towards Goal     Problem: Patient Education: Go to Patient Education Activity  Goal: Patient/Family Education  Outcome: Progressing Towards Goal     Problem: Nutrition Deficit  Goal: *Optimize nutritional status  Outcome: Not Progressing Towards Goal     Problem: Pressure Injury - Risk of  Goal: *Prevention of pressure injury  Description: Document Chucho Scale and appropriate interventions in the flowsheet.   Outcome: Not Progressing Towards Goal  Note: Pressure Injury Interventions:  Sensory Interventions: Assess changes in LOC, Discuss PT/OT consult with provider, Keep linens dry and wrinkle-free, Maintain/enhance activity level, Pressure redistribution bed/mattress (bed type)    Moisture Interventions: Maintain skin hydration (lotion/cream)    Activity Interventions: Increase time out of bed, Pressure redistribution bed/mattress(bed type), PT/OT evaluation    Mobility Interventions: HOB 30 degrees or less, Pressure redistribution bed/mattress (bed type), PT/OT evaluation    Nutrition Interventions: Document food/fluid/supplement intake    Friction and Shear Interventions: Apply protective barrier, creams and emollients, HOB 30 degrees or less                Problem: Injury - Risk of, Adverse Drug Event  Goal: *Absence of adverse drug events  Outcome: Progressing Towards Goal

## 2021-02-18 NOTE — PROGRESS NOTES
Mews low b/p 91/56 sitting up in recliner Dr Makenna Shea made aware of low blood pressure will continue to monitor LOC and vitals

## 2021-02-18 NOTE — PROGRESS NOTES
Problem: Dysphagia (Adult)  Goal: *Acute Goals and Plan of Care (Insert Text)  Description: Patient will:  1. Tolerate PO trials with 0 s/s overt distress in 4/5 trials  2. Utilize compensatory swallow strategies/maneuvers (decrease bite/sip, size/rate, alt. liq/sol) with min cues in 4/5 trials    Rec:     Soft solid with thin liquids  Aspiration precautions  HOB >45 during po intake, remain >30 for 30-45 minutes after po   Small bites/sips; alternate liquid/solid with slow feeding rate   Oral care TID  Meds per pt preference  Feeding assistance & encouragement       Outcome: Progressing Towards Goal     SPEECH LANGUAGE PATHOLOGY DYSPHAGIA TREATMENT    Patient: Radha Coates (81 y.o. male)  Date: 2/18/2021  Diagnosis: Liver cirrhosis ([facility] [K74.60] Liver cirrhosis ([facility]       Precautions: aspiration, Fall  PLOF: per H&P     ASSESSMENT:  Pt seen for FU dysphagia management during lunch meal. Pt sitting up in chair, drowsy and confused, keeping eyes close though responding verbally to SLP with single words at times and opening mouth to continue eating meal. Per d/w RN student, pt with decreased intake, accepting of meds and tolerating current diet. Pt given therapeutic PO trials soft solid texture with thin liquids via straw and tandem drinking. Pt needing to be fed, not initiating self-feeding. No s/sx airway compromise noted across trials, pt eating ~40% of lunch meal with SLP to include an entire ensure. Swallow appears timely, adequate laryngeal elevation noted via palpation, no change in vocal/resp quality appreciated via cervical auscultation. Toward end of session, MD, RN, RN student, Dietician entered room for rounds. Pt refusing further PO trials following interdisciplinary team entering room. Recommend continue soft solid diet with thin liquids, meds as tolerated.  Pt will benefit  from feeding assistance with adherence to safe swallow strategies: slow rate of feeding, small bites/sips, alternate solids/liquids. Pt will benefit from cog eval as appropriate and more alert. SLP will FU per POC. Progression toward goals:  []         Improving appropriately and progressing toward goals  [x]         Improving slowly and progressing toward goals  []         Not making progress toward goals and plan of care will be adjusted     PLAN:  Recommendations and Planned Interventions:  See above. Patient continues to benefit from skilled intervention to address the above impairments. Continue treatment per established plan of care. Discharge Recommendations: To Be Determined     SUBJECTIVE:   Patient stated yes. OBJECTIVE:   Cognitive and Communication Status:  Neurologic State: Drowsy  Orientation Level: Oriented to person, Disoriented to place, Disoriented to situation, Disoriented to time  Cognition: Decreased command following, Decreased attention/concentration  Perception: Appears intact  Perseveration: No perseveration noted  Safety/Judgement: Decreased awareness of need for assistance, Decreased awareness of need for safety, Decreased insight into deficits, Fall prevention  Dysphagia Treatment:  Oral Assessment:  Oral Assessment  Labial: No impairment  Dentition: Natural  Oral Hygiene: WFL  Lingual: No impairment  Velum: No impairment  Mandible: No impairment  P.O. Trials:   Patient Position: Sitting up in chair   Vocal quality prior to P.O.: Low volume   Consistency Presented: Thin liquid, Mechanical soft   How Presented: Self-fed/presented, Straw, Successive swallows, Spoon       Bolus Acceptance: No impairment   Bolus Formation/Control: No impairment   Type of Impairment: Delayed, Mastication   Propulsion: No impairment   Oral Residue: None   Initiation of Swallow: No impairment   Laryngeal Elevation: Functional   Aspiration Signs/Symptoms: None   Pharyngeal Phase Characteristics: No impairment, issues, or problems    Effective Modifications:  Alternate liquids/solids, Small sips and bites   Cues for Modifications: Maximal         Oral Phase Severity: Mild   Pharyngeal Phase Severity : No impairment       PAIN:  Pain level pre-treatment: 0/10   Pain level post-treatment: 0/10     After treatment:   []              Patient left in no apparent distress sitting up in chair  [x]              Patient left in no apparent distress in bed  [x]              Call bell left within reach  [x]              Nursing notified  []              Family present  []              Caregiver present  []              Bed alarm activated      COMMUNICATION/EDUCATION:   [x] Aspiration precautions; swallow safety; compensatory techniques  []        Patient unable to participate in education; education ongoing with staff  []  Posted safety precautions in patient's room.   [] Oral-motor/laryngeal strengthening exercises      Thank you for this referral.    Vimal Simmons M.S., CCC-SLP  Speech-Language Pathologist    Time Calculation: 21 mins

## 2021-02-18 NOTE — PROGRESS NOTES
Nutrition Assessment      Pt more alert today. Ate 25% of breakfast per RN report; drank 2 Ensure this morning at breakfast. Ate 40% of lunch with SLP; drank 1 Ensure. Po intake of meals/ supplements improving. Needs assistance/ encouragement with meals. No need for enteral nutrition support at this time; aslo to defer due to esophageal varices. Pt accepted to [facility] for liver transplant; awaiting available bed. BM 2/15, soft; miralax prn. Meds: folic acid, lactulose, MVI, thiamine. Pt discussed during interdisciplinary rounds. Progressing towards nutrition goals. Nutrition Recommendation/Plan:  - Continue current diet order, HS snack, and Ensure Enlive TID.  Assistance with meals as pt needs  - Continue MVI, thiamine, and folic acid  - Encourage po intake of meals and supplements               Electronically signed by Tyler Saeed RD on 2/18/2021 at 1:08 PM    Contact Number: [phone]

## 2021-02-18 NOTE — PROGRESS NOTES
Patient pending transfer to [facility]. Waiting for bed assignment. [facility] packet on chart.     Donato Jones RN BSN  Care Manager  [phone]

## 2021-02-18 NOTE — PROGRESS NOTES
Soap alem enema given patient tolerated procedure fairly well large amount tan color soft stool noted

## 2021-02-19 LAB
ALBUMIN SERPL-MCNC: 1.8 G/DL (ref 3.4–5)
ALBUMIN/GLOB SERPL: 0.3 {RATIO} (ref 0.8–1.7)
ALP SERPL-CCNC: 199 U/L (ref 45–117)
ALT SERPL-CCNC: 490 U/L (ref 16–61)
AMMONIA PLAS-SCNC: 98 UMOL/L (ref 11–32)
ANION GAP SERPL CALC-SCNC: 10 MMOL/L (ref 3–18)
AST SERPL-CCNC: 1082 U/L (ref 10–38)
BACTERIA SPEC CULT: NORMAL
BACTERIA SPEC CULT: NORMAL
BASOPHILS # BLD: 0 K/UL (ref 0–0.06)
BASOPHILS NFR BLD: 0 % (ref 0–3)
BILIRUB SERPL-MCNC: 24.9 MG/DL (ref 0.2–1)
BUN SERPL-MCNC: 19 MG/DL (ref 7–18)
BUN/CREAT SERPL: 21 (ref 12–20)
CALCIUM SERPL-MCNC: 8 MG/DL (ref 8.5–10.1)
CHLORIDE SERPL-SCNC: 101 MMOL/L (ref 100–111)
CO2 SERPL-SCNC: 23 MMOL/L (ref 21–32)
CREAT SERPL-MCNC: 0.91 MG/DL (ref 0.6–1.3)
DIFFERENTIAL METHOD BLD: ABNORMAL
EOSINOPHIL # BLD: 0 K/UL (ref 0–0.4)
EOSINOPHIL NFR BLD: 0 % (ref 0–5)
ERYTHROCYTE [DISTWIDTH] IN BLOOD BY AUTOMATED COUNT: 19.5 % (ref 11.6–14.5)
GLOBULIN SER CALC-MCNC: 5.5 G/DL (ref 2–4)
GLUCOSE BLD STRIP.AUTO-MCNC: 114 MG/DL (ref 70–110)
GLUCOSE BLD STRIP.AUTO-MCNC: 128 MG/DL (ref 70–110)
GLUCOSE BLD STRIP.AUTO-MCNC: 165 MG/DL (ref 70–110)
GLUCOSE BLD STRIP.AUTO-MCNC: 263 MG/DL (ref 70–110)
GLUCOSE SERPL-MCNC: 114 MG/DL (ref 74–99)
HCT VFR BLD AUTO: 31.5 % (ref 36–48)
HGB BLD-MCNC: 11.4 G/DL (ref 13–16)
INR PPP: 1.8 (ref 0.8–1.2)
LYMPHOCYTES # BLD: 0.6 K/UL (ref 0.8–3.5)
LYMPHOCYTES NFR BLD: 22 % (ref 20–51)
MCH RBC QN AUTO: 31.1 PG (ref 24–34)
MCHC RBC AUTO-ENTMCNC: 36.2 G/DL (ref 31–37)
MCV RBC AUTO: 86.1 FL (ref 74–97)
MONOCYTES # BLD: 0.3 K/UL (ref 0–1)
MONOCYTES NFR BLD: 10 % (ref 2–9)
NEUTS BAND NFR BLD MANUAL: 4 % (ref 0–5)
NEUTS SEG # BLD: 1.9 K/UL (ref 1.8–8)
NEUTS SEG NFR BLD: 64 % (ref 42–75)
NRBC BLD-RTO: 2 PER 100 WBC
PLATELET # BLD AUTO: 85 K/UL (ref 135–420)
PLATELET COMMENTS,PCOM: ABNORMAL
POTASSIUM SERPL-SCNC: 4.1 MMOL/L (ref 3.5–5.5)
PROT SERPL-MCNC: 7.3 G/DL (ref 6.4–8.2)
PROTHROMBIN TIME: 20.2 SEC (ref 11.5–15.2)
RBC # BLD AUTO: 3.66 M/UL (ref 4.7–5.5)
RBC MORPH BLD: ABNORMAL
SERVICE CMNT-IMP: NORMAL
SERVICE CMNT-IMP: NORMAL
SODIUM SERPL-SCNC: 134 MMOL/L (ref 136–145)
TOTAL CELLS COUNTED SPEC: 50
WBC # BLD AUTO: 2.8 K/UL (ref 4.6–13.2)

## 2021-02-19 PROCEDURE — 74011250637 HC RX REV CODE- 250/637: Performed by: HOSPITALIST

## 2021-02-19 PROCEDURE — 2709999900 HC NON-CHARGEABLE SUPPLY

## 2021-02-19 PROCEDURE — 82962 GLUCOSE BLOOD TEST: CPT

## 2021-02-19 PROCEDURE — 74011250637 HC RX REV CODE- 250/637: Performed by: INTERNAL MEDICINE

## 2021-02-19 PROCEDURE — 99239 HOSP IP/OBS DSCHRG MGMT >30: CPT | Performed by: INTERNAL MEDICINE

## 2021-02-19 PROCEDURE — 82140 ASSAY OF AMMONIA: CPT

## 2021-02-19 PROCEDURE — 85025 COMPLETE CBC W/AUTO DIFF WBC: CPT

## 2021-02-19 PROCEDURE — 99232 SBSQ HOSP IP/OBS MODERATE 35: CPT | Performed by: INTERNAL MEDICINE

## 2021-02-19 PROCEDURE — 74011250637 HC RX REV CODE- 250/637: Performed by: FAMILY MEDICINE

## 2021-02-19 PROCEDURE — 36415 COLL VENOUS BLD VENIPUNCTURE: CPT

## 2021-02-19 PROCEDURE — 74011636637 HC RX REV CODE- 636/637: Performed by: FAMILY MEDICINE

## 2021-02-19 PROCEDURE — 85610 PROTHROMBIN TIME: CPT

## 2021-02-19 PROCEDURE — 80053 COMPREHEN METABOLIC PANEL: CPT

## 2021-02-19 PROCEDURE — 74011250637 HC RX REV CODE- 250/637: Performed by: GENERAL PRACTICE

## 2021-02-19 PROCEDURE — 65660000000 HC RM CCU STEPDOWN

## 2021-02-19 RX ADMIN — Medication 10 ML: at 22:29

## 2021-02-19 RX ADMIN — PANTOPRAZOLE SODIUM 40 MG: 40 TABLET, DELAYED RELEASE ORAL at 09:22

## 2021-02-19 RX ADMIN — INSULIN LISPRO 2 UNITS: 100 INJECTION, SOLUTION INTRAVENOUS; SUBCUTANEOUS at 17:16

## 2021-02-19 RX ADMIN — PROMETHAZINE HYDROCHLORIDE 12.5 MG: 25 TABLET ORAL at 22:22

## 2021-02-19 RX ADMIN — INSULIN LISPRO 6 UNITS: 100 INJECTION, SOLUTION INTRAVENOUS; SUBCUTANEOUS at 11:36

## 2021-02-19 RX ADMIN — LORAZEPAM 1 MG: 1 TABLET ORAL at 03:19

## 2021-02-19 RX ADMIN — LACTULOSE 30 ML: 20 SOLUTION ORAL at 09:21

## 2021-02-19 RX ADMIN — LACTULOSE 45 ML: 20 SOLUTION ORAL at 17:15

## 2021-02-19 RX ADMIN — LORAZEPAM 1 MG: 1 TABLET ORAL at 22:23

## 2021-02-19 RX ADMIN — LACTULOSE 45 ML: 20 SOLUTION ORAL at 22:27

## 2021-02-19 RX ADMIN — Medication 10 ML: at 05:40

## 2021-02-19 RX ADMIN — QUETIAPINE FUMARATE 600 MG: 300 TABLET, EXTENDED RELEASE ORAL at 22:29

## 2021-02-19 RX ADMIN — Medication 10 ML: at 16:29

## 2021-02-19 RX ADMIN — NADOLOL 20 MG: 40 TABLET ORAL at 09:21

## 2021-02-19 RX ADMIN — RIFAXIMIN 550 MG: 550 TABLET ORAL at 09:22

## 2021-02-19 RX ADMIN — LORAZEPAM 2 MG: 1 TABLET ORAL at 01:11

## 2021-02-19 RX ADMIN — THERA TABS 1 TABLET: TAB at 09:21

## 2021-02-19 RX ADMIN — RIFAXIMIN 550 MG: 550 TABLET ORAL at 17:15

## 2021-02-19 RX ADMIN — FOLIC ACID 1 MG: 1 TABLET ORAL at 09:21

## 2021-02-19 NOTE — PROGRESS NOTES
SUBJECTIVE:    Patient feels much better. Denies any headaches or dizziness. No chest pain or shortness of breath or cough. No nausea or vomiting. No diarrhea. OBJECTIVE:    /70 (BP 1 Location: Left upper arm, BP Patient Position: Sitting)   Pulse 68   Temp 98.6 °F (37 °C)   Resp 10   Ht 5' 10\" (1.778 m)   Wt 87.6 kg (193 lb 3.4 oz)   SpO2 99%   BMI 27.72 kg/m²     General appearance -awake, jaundice present, follows commands appropriately, not in acute distress  Eyes - sclera icteric, no pallor  Nose - no obvious nasal discharge. Neck - supple, no JVD, trachea is midline  Chest -clear air entry noted in bases, no wheezes  Heart - S1 and S2 normal  Abdomen - soft, nontender, distended, Bowel sounds present  Neurological -awake, slow in response, able to answer questions appropriately, moves all 4 extremities, no asterixis noted currently  Skin -palmar erythema present, spider nevi present. Musculoskeletal - no joint tenderness or erythema of knees bilaterally  Extremities - no pedal edema noted    ASSESSMENT:    1. Acute decompensated liver failure due to alcohol  2. Acute hepatitis serology positive  3. Hepatic encephalopathy, stable currently  4. Hyperbilirubinemia with nonobstructive jaundice  5. Acute renal failure, resolved  6. Pancytopenia, stable  7. Paroxysmal atrial fibrillation, rate controlled  8. Coagulopathy due to liver cirrhosis  9. type 2 diabetes mellitus    PLAN:    Continue Nodolor, Xifaxan, lactulose and vitamins  Continue Ativan as needed  Patient is waiting to go to [facility] for liver transplantation  As per nursing: They got a call from [facility] and they do not have any beds available today. Disclaimer: Sections of this note are dictated using utilizing voice recognition software, which may have resulted in some phonetic based errors in grammar and contents.  Even though attempts were made to correct all the mistakes, some may have been missed, and remained in the body of the document. If questions arise, please contact our department. Recent Results (from the past 24 hour(s))   GLUCOSE, POC    Collection Time: 02/18/21 11:34 AM   Result Value Ref Range    Glucose (POC) 190 (H) 70 - 110 mg/dL   GLUCOSE, POC    Collection Time: 02/18/21  4:02 PM   Result Value Ref Range    Glucose (POC) 195 (H) 70 - 110 mg/dL   GLUCOSE, POC    Collection Time: 02/18/21  8:56 PM   Result Value Ref Range    Glucose (POC) 132 (H) 70 - 110 mg/dL   AMMONIA    Collection Time: 02/19/21  2:41 AM   Result Value Ref Range    Ammonia 98 (H) 11 - 32 UMOL/L   CBC WITH AUTOMATED DIFF    Collection Time: 02/19/21  2:41 AM   Result Value Ref Range    WBC 2.8 (L) 4.6 - 13.2 K/uL    RBC 3.66 (L) 4.70 - 5.50 M/uL    HGB 11.4 (L) 13.0 - 16.0 g/dL    HCT 31.5 (L) 36.0 - 48.0 %    MCV 86.1 74.0 - 97.0 FL    MCH 31.1 24.0 - 34.0 PG    MCHC 36.2 31.0 - 37.0 g/dL    RDW 19.5 (H) 11.6 - 14.5 %    PLATELET 85 (L) 371 - 420 K/uL    NEUTROPHILS 64 42 - 75 %    BAND NEUTROPHILS 4 0 - 5 %    LYMPHOCYTES 22 20 - 51 %    MONOCYTES 10 (H) 2 - 9 %    EOSINOPHILS 0 0 - 5 %    BASOPHILS 0 0 - 3 %    NRBC 2.0 (H) 0  WBC    TOTAL CELLS COUNTED 50      ABS. NEUTROPHILS 1.9 1.8 - 8.0 K/UL    ABS. LYMPHOCYTES 0.6 (L) 0.8 - 3.5 K/UL    ABS. MONOCYTES 0.3 0 - 1.0 K/UL    ABS. EOSINOPHILS 0.0 0.0 - 0.4 K/UL    ABS.  BASOPHILS 0.0 0.0 - 0.06 K/UL    DF MANUAL      PLATELET COMMENTS DECREASED PLATELETS      RBC COMMENTS ANISOCYTOSIS  1+        RBC COMMENTS POIKILOCYTOSIS  1+        RBC COMMENTS ISIS CELLS  1+       METABOLIC PANEL, COMPREHENSIVE    Collection Time: 02/19/21  2:41 AM   Result Value Ref Range    Sodium 134 (L) 136 - 145 mmol/L    Potassium 4.1 3.5 - 5.5 mmol/L    Chloride 101 100 - 111 mmol/L    CO2 23 21 - 32 mmol/L    Anion gap 10 3.0 - 18 mmol/L    Glucose 114 (H) 74 - 99 mg/dL    BUN 19 (H) 7.0 - 18 MG/DL    Creatinine 0.91 0.6 - 1.3 MG/DL    BUN/Creatinine ratio 21 (H) 12 - 20      GFR est AA >60 >60 ml/min/1.73m2    GFR est non-AA >60 >60 ml/min/1.73m2    Calcium 8.0 (L) 8.5 - 10.1 MG/DL    Bilirubin, total 24.9 (H) 0.2 - 1.0 MG/DL    ALT (SGPT) 490 (H) 16 - 61 U/L    AST (SGOT) 1,082 (H) 10 - 38 U/L    Alk.  phosphatase 199 (H) 45 - 117 U/L    Protein, total 7.3 6.4 - 8.2 g/dL    Albumin 1.8 (L) 3.4 - 5.0 g/dL    Globulin 5.5 (H) 2.0 - 4.0 g/dL    A-G Ratio 0.3 (L) 0.8 - 1.7     GLUCOSE, POC    Collection Time: 02/19/21  6:42 AM   Result Value Ref Range    Glucose (POC) 128 (H) 70 - 110 mg/dL

## 2021-02-19 NOTE — PROGRESS NOTES
Bedside and Verbal shift change report [facility] by Octavia Nunn RN   (offgoing nurse). Report given with SBAR, Kardex, MAR and Recent Results.

## 2021-02-19 NOTE — ROUTINE PROCESS
Call received from [facility] concerning pt's transfer to [facility], Talked with Maura Guzman, she will set up transportation and call back with  time. And room number. MD Maynor Beard paged concerning EMTALA completion, awaiting call back Assumed pt with pt resting in bed, pt remains AOX4. denies pain, no noted S/SX of distress. Pt tolerated shift meds and meals well, pt remains jaundiced. Bed remains locked and low. Oncoming nurse notified of pt's status, will continue to follow plan of care. Bedside and Verbal shift change report given to Jeff Phillips (oncoming nurse) by Soy Monreal RN (offgoing nurse). Report included the following information SBAR, Kardex and Recent Results.

## 2021-02-19 NOTE — PROGRESS NOTES
[email or web address]  [phone]    Gastroenterology follow up-Progress note    Impression:  1. Decompensated cirrhosis/hepatic failure w/ superimposed acute viral hepatitis A              - MELD 34 on admission, down to ~ 27              - AFP L3% 12.9 (H)              - Trial of corticosteroid unlikely to change decompensated state although Maddrey score ~ 51 indicative of poor prognosis  2. Jaundice - due to #1, Tbili 24.9  3. Abnormal LFTs - continue to increase, ? Due to Hep A infection   4. Ascites - mild/mod, diuretics to be started when NICK improves  5. HE with hyperammonemia - ammonia 98 but has adequate mentation  6. Chronic thrombocytopenia without overt bleeding  7. ETOH abuse - continues to drink 2 to 3 alcoholic beverages daily  8. Hyponatremia  9. CAD  10. Hypotension    Plan:  1. Await transfer to [facility] - has been accepted, waiting on bed availability  2. Monitor LFTs and PT/INR  3. Paracentesis as needed, monitor abdomen  4. Continue nadalol, hold if HR< 60BPM  5. Continue Lactulose and Xifaxan, titrate lactulose to 2 to 3 soft BMs per day  6. Monitor PO intake, diet per speech, will want to avoid placement of NGT if possible due to bleed risk  7. Will need EGD to evaluate for varices when more stable, this can be done per [facility]  8. ETOH cessation  9. Medical management per primary team    Chief Complaint: HE, decompensated cirrhosis      Subjective:  Somnolent but answering questions appropriately    ROS: Denies any fevers, chills, rash.      Eyes: Scleral icterus   Neck: ROM normal, supple and trachea normal   Cardiovascular: heart normal, intact distal pulses, normal rate and regular rhythm   Pulmonary/Chest Wall: breath sounds normal and effort normal   Abdominal: appearance normal, bowel sounds normal and soft, non-acute, non-tender     Patient Active Problem List   Diagnosis Code    Anxiety and depression F41.9, F32.9    Alcohol abuse F10.10    H/O lumbosacral spine surgery Z98.890    Chronic low back pain with sciatica M54.40, G89.29    H/O coronary angioplasty Z98.61    Depression F32.9    Suicidal thoughts R45.851    Pancytopenia ([address] R55.809    Essential hypertension with goal blood pressure less than 130/80 I10    Type 2 diabetes mellitus with diabetic polyneuropathy, without long-term current use of insulin (HCC) E11.42    CAD (coronary artery disease) I25.10    Tachycardia R00.0    Atrial fibrillation (HCC) I48.91    Mild concentric left ventricular hypertrophy (LVH) M85.5    Diastolic dysfunction Q99.00    Tricuspid regurgitation I07.1    Mitral regurgitation I34.0    Alcohol dependence (HCC) F10.20    Family history of premature CAD Z82.49    Type 2 diabetes mellitus with diabetic neuropathy (HCC) E11.40    Ascites due to alcoholic hepatitis S55.28    Liver disease due to alcohol (HCC) K70.9    Liver cirrhosis (HCC) K74.60    Hepatitis A infection B15.9         Visit Vitals  /69 (BP 1 Location: Left upper arm, BP Patient Position: Supine)   Pulse 62   Temp 98.3 °F (36.8 °C)   Resp 16   Ht 5' 10\" (1.778 m)   Wt 87.6 kg (193 lb 3.4 oz)   SpO2 99%   BMI 27.72 kg/m²           Intake/Output Summary (Last 24 hours) at 2/19/2021 1317  Last data filed at 2/19/2021 1251  Gross per 24 hour   Intake 1552 ml   Output 1050 ml   Net 502 ml       CBC w/Diff    Lab Results   Component Value Date/Time    WBC 2.8 (L) 02/19/2021 02:41 AM    RBC 3.66 (L) 02/19/2021 02:41 AM    HGB 11.4 (L) 02/19/2021 02:41 AM    HCT 31.5 (L) 02/19/2021 02:41 AM    MCV 86.1 02/19/2021 02:41 AM    MCH 31.1 02/19/2021 02:41 AM    MCHC 36.2 02/19/2021 02:41 AM    RDW 19.5 (H) 02/19/2021 02:41 AM    PLT 85 (L) 02/19/2021 02:41 AM    Lab Results   Component Value Date/Time    GRANS 64 02/19/2021 02:41 AM    LYMPH 22 02/19/2021 02:41 AM    EOS 0 02/19/2021 02:41 AM    BANDS 4 02/19/2021 02:41 AM    BASOS 0 02/19/2021 02:41 AM      Basic Metabolic Profile   Recent Labs     02/19/21  0241 02/17/21  0322 02/17/21  0325   *   < > 133*   K 4.1   < > 3.6      < > 101   CO2 23   < > 27   BUN 19*   < > 19*   CA 8.0*   < > 8.3*   MG  --   --  1.9   PHOS  --   --  3.7    < > = values in this interval not displayed. Hepatic Function    Lab Results   Component Value Date/Time    ALB 1.8 (L) 02/19/2021 02:41 AM    TP 7.3 02/19/2021 02:41 AM     (H) 02/19/2021 02:41 AM    No results found for: TBIL       Coags   No results for input(s): PTP, INR, APTT, INREXT in the last 72 hours. ALIVIA Pace    [facility]. [email or web address]  Phone: [phone]  Pager: [phone]

## 2021-02-19 NOTE — PROGRESS NOTES
Progress Note    Patient: Ismael Rosenberg MRN: [id number]  SSN: [id number]    YOB: 1960  Age: 62 y.o. Sex: male      Admit Date: 2/12/2021    LOS: 7 days     24 hour events:     Soapsuds enema yesterday with large amount of tan, nonbloody stool. Subjective:     Somnolent during today's exam; denies acute complaints. Per nursing tolerated p.o. intake of solid food yesterday without incident. Objective:     Vitals:    02/18/21 2000 02/19/21 0007 02/19/21 0455 02/19/21 0748   BP: 102/64 104/69 101/73 104/70   Pulse: 69 65 62 68   Resp: 18 16 16 10   Temp: 97.7 °F (36.5 °C) 97.5 °F (36.4 °C) 97.6 °F (36.4 °C) 98.6 °F (37 °C)   SpO2: 99% 98% 99% 99%   Weight:   87.6 kg (193 lb 3.4 oz)    Height:            Intake and Output:  Current Shift: No intake/output data recorded. Last three shifts: 02/17 1901 - 02/19 0700  In: 2300 [P.O.:2300]  Out: 1950 [Urine:1950]    Physical Exam:   GENERAL: fatigued, cooperative, slowed mentation, appears stated age, confused  EYE: positive findings: sclera icterus b/l  LUNG: clear to auscultation bilaterally  HEART: regular rate and rhythm, S1, S2 normal, no murmur, click, rub or gallop  ABDOMEN: Distended, nontender throughout, no appreciable fluid wave.  No caput medusae or spider angiomata appreciated. EXTREMITIES:  extremities normal, atraumatic, no cyanosis or edema  SKIN: Global jaundice noted  NEUROLOGIC: positive findings: confused, somnolent. Positive asterixis.     Lab/Data Review:  Labs: Results:       Chemistry Recent Labs     02/19/21  0241 02/18/21  0300 02/17/21  0325   * 121* 123*   * 134* 133*   K 4.1 3.6 3.6    102 101   CO2 23 23 27   BUN 19* 23* 19*   CREA 0.91 1.14 0.93   CA 8.0* 8.2* 8.3*   AGAP 10 9 5   BUCR 21* 20 20   * 184* 184*   TP 7.3 7.3 7.1   ALB 1.8* 1.8* 1.9*   GLOB 5.5* 5.5* 5.2*   AGRAT 0.3* 0.3* 0.4*      CBC w/Diff Recent Labs     02/19/21  0241 02/18/21  0300 02/17/21  0325   WBC 2.8* 2.7* 2.4* RBC 3.66* 3.66* 3.55*   HGB 11.4* 11.3* 11.0*   HCT 31.5* 31.4* 30.4*   PLT 85* 65* 65*   GRANS 64 69 59   LYMPH 22 18* 24   EOS 0 2 2      Cardiac Enzymes No results for input(s): CPK, CKND1, KIERRA in the last 72 hours. No lab exists for component: CKRMB, TROIP   Coagulation No results for input(s): PTP, INR, APTT, INREXT, INREXT in the last 72 hours. Lipid Panel Lab Results   Component Value Date/Time    Cholesterol, total 205 (H) 07/10/2019 01:48 PM    HDL Cholesterol 55 07/10/2019 01:48 PM    LDL, calculated 119.8 (H) 07/10/2019 01:48 PM    VLDL, calculated 30.2 07/10/2019 01:48 PM    Triglyceride 151 (H) 07/10/2019 01:48 PM    CHOL/HDL Ratio 3.7 07/10/2019 01:48 PM      BNP No results for input(s): BNPP in the last 72 hours. Liver Enzymes Recent Labs     02/19/21  0241   TP 7.3   ALB 1.8*   *      Thyroid Studies Lab Results   Component Value Date/Time    TSH 1.460 04/05/2019 03:23 PM          No results found for: SDES Lab Results   Component Value Date/Time    Culture result: ENTEROCOCCUS SPECIES (A) 02/13/2021 03:52 AM    Culture result: NO GROWTH 6 DAYS 02/12/2021 11:15 AM    Culture result: NO GROWTH 6 DAYS 02/12/2021 11:05 AM        Imaging:     Ct Abd Pelv Wo Cont    Result Date: 2/12/2021  1. Nodular hepatic contour suggesting underlying cirrhosis. Discrete mass or biliary dilation not appreciated. 2. Splenomegaly. Gastrohepatic varices. Recanalized umbilical vein. Mesenteric edema and abdominal and pelvic ascites. Findings consistent with portal hypertension. 3. No bowel distention to suggest obstruction. [employer] [employer]    Result Date: 2/15/2021  Small volume perihepatic ascites, not sufficient for paracentesis. No paracentesis was performed. [employer]    Result Date: 2/12/2021  1. Findings consistent with hepatocellular disease. 2.  Mild ascites. 3.  Cholelithiasis but no distinct evidence to suggest acute cholecystitis.     Xr Chest Port    Result Date: 2/13/2021  No acute cardiopulmonary process. Assessment:     Decompensated cirrhosis with transaminitis:  MELD-Na 27, uptrending during admission; GI following             -Hepatic encephalopathy  stable today             -Accepted for transfer to [facility], waiting on bed availability. Acute Hep A Hep A IgM positive.               - HCV serology negative  Hyperbilirubinemia with non-obstructive jaundice:  Increase to 24 today from 22 yesterday  Hepatic encephalopathy:  Ammonia 98 <-71 <- 52 <; asterixis and sleep disturbances. Acute Hyponatremia- 134 today,  stable  Hypokalemia-resolved  NICK- resolved  Pancytopenia: unchanged since 2018  Coagulopathy with elevated INR due to cirrhosis:  INR 1.8  Dm type 2:  Hb A1c 6.4; on SSI and accuchecks. POC glucoses well controlled on insulin lispro without basal insulin.      Hx ETOH abuse:  BAL upon admission <3.  No clinical EtOH w/d. A.fib:  Currently rate controlled; dx 8/2020 due to ETOH withdrawal; followed by Dr. Adair Godfrey; was on Eliquis and toprol  Non-obstructive CAD:  Cath in 2011     Plan:     Continue lactulose, rifaximin as scheduled for treatment of hepatic encephalopathy. Good response to enema yesterday; titrate lactulose to 2-3 bowel movements per day, repeat enema as needed. Defer NG tube for now given that he is tolerating p.o. intake; also has history of esophageal varices. Continue to hold home apixaban and defer chemical DVT prophylaxis in the setting of hepatic coagulopathy. EGD indicated to assess for esophageal varices, defer this to [facility] liver transplant service after transfer. Continue nadolol, Protonix as scheduled      Signed By: Kory Marley MD     February 19, 2021      [facility] medical dictation software was used for portions of this report. Unintended errors may occur.

## 2021-02-20 VITALS
SYSTOLIC BLOOD PRESSURE: 105 MMHG | RESPIRATION RATE: 18 BRPM | BODY MASS INDEX: 27.65 KG/M2 | HEART RATE: 69 BPM | OXYGEN SATURATION: 100 % | WEIGHT: 193.12 LBS | TEMPERATURE: 98.1 F | DIASTOLIC BLOOD PRESSURE: 60 MMHG | HEIGHT: 70 IN

## 2021-02-20 LAB
ALBUMIN SERPL-MCNC: 1.8 G/DL (ref 3.4–5)
ALBUMIN/GLOB SERPL: 0.3 {RATIO} (ref 0.8–1.7)
ALP SERPL-CCNC: 190 U/L (ref 45–117)
ALT SERPL-CCNC: 459 U/L (ref 16–61)
AMMONIA PLAS-SCNC: 104 UMOL/L (ref 11–32)
AST SERPL-CCNC: 983 U/L (ref 10–38)
BILIRUB DIRECT SERPL-MCNC: 20 MG/DL (ref 0–0.2)
BILIRUB SERPL-MCNC: 23.3 MG/DL (ref 0.2–1)
GLOBULIN SER CALC-MCNC: 5.4 G/DL (ref 2–4)
PROT SERPL-MCNC: 7.2 G/DL (ref 6.4–8.2)

## 2021-02-20 PROCEDURE — 36415 COLL VENOUS BLD VENIPUNCTURE: CPT

## 2021-02-20 PROCEDURE — 82140 ASSAY OF AMMONIA: CPT

## 2021-02-20 PROCEDURE — 80076 HEPATIC FUNCTION PANEL: CPT

## 2021-02-20 NOTE — DISCHARGE SUMMARY
Date of admission: February 12, 2021    Date of possible transfer: On February 19, 2021 or February 20, 2021    Patient is going to liver transplant unit at [facility] under care of Dr. Liz Olivera    Discharge diagnosis:    1. Acute decompensated liver failure due to alcohol  2. Acute hepatitis serology positive  3. Hepatic encephalopathy, stable currently  4. Hyperbilirubinemia with nonobstructive jaundice  5. Acute renal failure, resolved  6. Pancytopenia, stable  7. Paroxysmal atrial fibrillation, rate controlled  8. Coagulopathy due to liver cirrhosis  9. type 2 diabetes mellitus    Brief Hospital course: Patient was admitted to the hospital on February 12, 2021 with complaint of jaundice. Please refer hospital admission H&P for further detail. Patient's current meld score is 27. A paracentesis was attempted but there was insufficient fluid. Patient was also treated for hepatic encephalopathy with lactulose and Xifaxan. He was seen by GI services here. GI talked to [facility] transplant center and patient got accepted over there for liver transplantation. Patient's overall hospital course remained unremarkable and he was tolerating medications very well. He will be transferred to [facility] later today on the following medications. Discussed with MsMoira [facility] Bo[facility] over the phone about possible transfer tonight. She verbalized understanding about it. Discharge medications:      Current Discharge Medication List      START taking these medications    Details   lactulose (CHRONULAC) 10 gram/15 mL solution Take 45 mL by mouth three (3) times daily. Qty: 90 mL, Refills: 0      nadoloL (CORGARD) 20 mg tablet Take 1 Tab by mouth daily. Indications: prevent bleeding varicose vein in the esophagus  Qty: 30 Tab, Refills: 0      rifAXIMin (XIFAXAN) 550 mg tablet Take 1 Tab by mouth two (2) times a day. Qty: 30 Tab, Refills: 0      therapeutic multivitamin (THERAGRAN) tablet Take 1 Tab by mouth daily.   Qty: 30 Tab, Refills: 0 CONTINUE these medications which have NOT CHANGED    Details   QUEtiapine SR (SEROquel XR) 300 mg sr tablet Take 2 Tabs by mouth nightly. Qty: 60 Tab, Refills: 1    Associated Diagnoses: Depression, unspecified depression type; Insomnia, unspecified type      folic acid (FOLVITE) 1 mg tablet Take 1 mg by mouth daily. thiamine HCL (B-1) 100 mg tablet take 1 tablet by mouth once daily      omeprazole (PRILOSEC) 40 mg capsule take 1 capsule by mouth every morning before breakfast         STOP taking these medications       lisinopril-hydroCHLOROthiazide (PRINZIDE, ZESTORETIC) 20-25 mg per tablet Comments:   Reason for Stopping:         metFORMIN (GLUCOPHAGE) 1,000 mg tablet Comments:   Reason for Stopping:         escitalopram oxalate (LEXAPRO) 5 mg tablet Comments:   Reason for Stopping:         metoprolol succinate (Toprol XL) 200 mg XL tablet Comments:   Reason for Stopping:         apixaban (ELIQUIS) 5 mg tablet Comments:   Reason for Stopping:         amiodarone (PACERONE) 100 mg tablet Comments:   Reason for Stopping:         furosemide (LASIX) 20 mg tablet Comments:   Reason for Stopping:         ferrous sulfate (IRON) 325 mg (65 mg iron) EC tablet Comments:   Reason for Stopping:         Blood-Glucose Meter (OneTouch Verio IQ Meter) misc Comments:   Reason for Stopping:         glucose blood VI test strips (OneTouch Verio test strips) strip Comments:   Reason for Stopping:         glimepiride (AMARYL) 4 mg tablet Comments:   Reason for Stopping:         magnesium oxide (MAG-OX) 400 mg tablet Comments:   Reason for Stopping: Total time to take care of this patient was 35 minutes and more than 50% of time was spent counseling and coordinating care. Disclaimer: Sections of this note are dictated using utilizing voice recognition software, which may have resulted in some phonetic based errors in grammar and contents.  Even though attempts were made to correct all the mistakes, some may have been missed, and remained in the body of the document. If questions arise, please contact our department.

## 2021-02-20 NOTE — PROGRESS NOTES
1930: Assumed patient care from [place] [id number]. Patient is alert and oriented to person,but disoriented to place, time and situation. Respiratory status is stable on room air. Vital signs are stable. MEWS score is a one. Patient denies any pain, discomfort, nausea vomiting dizziness or anxiety. White board and fall card is updated. Bed is locked and in lowest position. Call bell, water and personal belongings are within reach. Patient has no questions, comments or concerns after bedside shift report. TRANSFER - OUT REPORT:    Verbal report given to Javan Holden RN on June Bouche  being transferred to [facility] [address] room [address] B for routine progression of care       Report consisted of patients Situation, Background, Assessment and   Recommendations(SBAR). Information from the following report(s) SBAR, Kardex, Intake/Output, MAR and Recent Results was reviewed with the receiving nurse. Lines:   Peripheral IV 02/12/21 Left Antecubital (Active)   Site Assessment Clean, dry, & intact 02/19/21 1930   Phlebitis Assessment 0 02/19/21 1930   Infiltration Assessment 0 02/19/21 1930   Dressing Status Clean, dry, & intact 02/19/21 1930   Dressing Type Transparent;Tape 02/19/21 1930   Hub Color/Line Status Pink;Flushed;Patent;Capped 02/19/21 1930   Action Taken Open ports on tubing capped 02/19/21 1930   Alcohol Cap Used Yes 02/19/21 1930        Opportunity for questions and clarification was provided.       Patient transported with:   [place] (two [address] EMTs)

## 2021-02-20 NOTE — PROGRESS NOTES
Informed by NAKIA Guy that pt has a bed at [facility] and transport is to pick pt up at 2am. The access center is requesting CM to call pt's medicaid and obtain a trip confirmation # in order for [facility] to  the pt. CM called access center to obtain information on where pt is going. CM called [facility] Medicaid transportation to obtain confirmation #. CM then called the access center back and provided them with the medicaid confirmation # of [id number].       Shayne Jenkins RN BSN  Care Manager  [phone]

## 2021-02-24 ENCOUNTER — TRANSCRIBE ORDER (OUTPATIENT)
Dept: SCHEDULING | Age: 61
End: 2021-02-24

## 2021-02-24 DIAGNOSIS — R10.9 ABDOMINAL PAIN: Primary | ICD-10-CM

## 2021-03-18 ENCOUNTER — VIRTUAL VISIT (OUTPATIENT)
Dept: FAMILY MEDICINE CLINIC | Age: 61
End: 2021-03-18

## 2021-03-18 NOTE — PROGRESS NOTES
# listed - not accepting calls at this time [phone]    2nd attempt- no reply 1000am      3rd attempt- no reply [phone]

## 2021-07-06 ENCOUNTER — PATIENT OUTREACH (OUTPATIENT)
Dept: CASE MANAGEMENT | Age: 61
End: 2021-07-06

## 2021-07-06 NOTE — PROGRESS NOTES
.Date/Time:  7/6/2021 11:13 AM    Method of communication with patient:phone    [facility] ( [facility]) made out reach to  patient by telephone to offer Complex Case Management  ( CCM). Phone not excepting messages. ACM noted no PCP of record Keily Nurse attempted to contact patient x 3 on 3/18/2021. No response from Provider attempt  on 3/18/2021 JOSE Molina NP. Last office visit 12/16/2020 with Dr Nicolle Salinas. Noted [facility] appointments and [facility] cardiology appointments as well. ACM will exclude patient from CCM out reach attempts x 90 days.

## 2023-03-02 NOTE — BSMART NOTE
SW made contact with patient and provided him with material regarding substance abuse treatment facilities. SW empowered patient to make contact with facilities and inquire about resources. Patient appeared anxious about discharge and continues to address his relationship with his estranged wife. SW encouraged positive peer interaction as well as group activities. SW will continue to assist patient with disposition.       ANDREW Kothari, LCSW-E    [phone] (no pain/absence of nonverbal indicators of pain)

## 2024-03-15 ENCOUNTER — TRANSCRIBE ORDERS (OUTPATIENT)
Dept: ADMINISTRATIVE | Age: 64
End: 2024-03-15

## 2024-03-15 DIAGNOSIS — M77.01 MEDIAL EPICONDYLITIS OF RIGHT ELBOW: Primary | ICD-10-CM

## 2024-04-02 ENCOUNTER — HOSPITAL ENCOUNTER (OUTPATIENT)
Dept: MRI IMAGING | Facility: CLINIC | Age: 64
Discharge: HOME | End: 2024-04-04
Payer: COMMERCIAL

## 2024-04-02 DIAGNOSIS — M77.01 MEDIAL EPICONDYLITIS OF RIGHT ELBOW: ICD-10-CM

## 2024-04-02 PROCEDURE — 73221 MRI JOINT UPR EXTREM W/O DYE: CPT

## 2024-09-02 NOTE — ED NOTES
TRANSFER - OUT REPORT:    Verbal report given to Cielo Canada RN(name) on Idalia Landry  being transferred to [facility] (unit) for routine progression of care       Report consisted of patients Situation, Background, Assessment and   Recommendations(SBAR). Information from the following report(s) SBAR, Kardex, Intake/Output, MAR, Accordion and Recent Results was reviewed with the receiving nurse. Lines:   Peripheral IV 02/12/21 Left Antecubital (Active)   Site Assessment Clean, dry, & intact 02/12/21 1119   Phlebitis Assessment 0 02/12/21 1119   Infiltration Assessment 0 02/12/21 1119   Dressing Status Clean, dry, & intact 02/12/21 1119        Opportunity for questions and clarification was provided.       Patient transported with:   Registered Nurse COPD, moderate